# Patient Record
Sex: MALE | Race: WHITE | Employment: OTHER | URBAN - METROPOLITAN AREA
[De-identification: names, ages, dates, MRNs, and addresses within clinical notes are randomized per-mention and may not be internally consistent; named-entity substitution may affect disease eponyms.]

---

## 2017-04-20 PROBLEM — L72.3 SEBACEOUS CYST: Status: ACTIVE | Noted: 2017-04-20

## 2017-09-06 PROBLEM — M54.30 SCIATIC LEG PAIN: Status: ACTIVE | Noted: 2017-09-06

## 2017-11-09 PROBLEM — M48.062 SPINAL STENOSIS OF LUMBAR REGION WITH NEUROGENIC CLAUDICATION: Status: ACTIVE | Noted: 2017-11-09

## 2017-11-09 PROBLEM — M54.16 LUMBAR RADICULOPATHY: Status: ACTIVE | Noted: 2017-11-09

## 2017-12-14 PROBLEM — M48.062 LUMBAR STENOSIS WITH NEUROGENIC CLAUDICATION: Status: ACTIVE | Noted: 2017-12-14

## 2018-05-09 PROBLEM — M54.16 LUMBAR RADICULOPATHY: Status: RESOLVED | Noted: 2017-11-09 | Resolved: 2018-05-09

## 2018-05-09 PROBLEM — M54.30 SCIATIC LEG PAIN: Status: RESOLVED | Noted: 2017-09-06 | Resolved: 2018-05-09

## 2018-05-09 PROBLEM — M48.062 SPINAL STENOSIS OF LUMBAR REGION WITH NEUROGENIC CLAUDICATION: Status: RESOLVED | Noted: 2017-11-09 | Resolved: 2018-05-09

## 2018-05-09 PROBLEM — H35.30 MACULAR DEGENERATION: Status: ACTIVE | Noted: 2018-05-09

## 2018-06-01 ENCOUNTER — APPOINTMENT (OUTPATIENT)
Dept: GENERAL RADIOLOGY | Age: 83
DRG: 246 | End: 2018-06-01
Attending: EMERGENCY MEDICINE
Payer: MEDICARE

## 2018-06-01 ENCOUNTER — HOSPITAL ENCOUNTER (INPATIENT)
Age: 83
LOS: 2 days | Discharge: HOME | DRG: 246 | End: 2018-06-03
Attending: EMERGENCY MEDICINE | Admitting: INTERNAL MEDICINE
Payer: MEDICARE

## 2018-06-01 DIAGNOSIS — I21.4 NSTEMI (NON-ST ELEVATED MYOCARDIAL INFARCTION) (HCC): Primary | ICD-10-CM

## 2018-06-01 PROBLEM — E78.5 DYSLIPIDEMIA: Status: ACTIVE | Noted: 2018-06-01

## 2018-06-01 PROBLEM — I24.9 ACS (ACUTE CORONARY SYNDROME) (HCC): Status: ACTIVE | Noted: 2018-06-01

## 2018-06-01 PROBLEM — I24.9 ACS (ACUTE CORONARY SYNDROME) (HCC): Status: RESOLVED | Noted: 2018-06-01 | Resolved: 2018-06-01

## 2018-06-01 PROBLEM — I50.21 ACUTE SYSTOLIC CONGESTIVE HEART FAILURE (HCC): Status: ACTIVE | Noted: 2018-06-01

## 2018-06-01 PROBLEM — I20.0 UNSTABLE ANGINA (HCC): Status: ACTIVE | Noted: 2018-06-01

## 2018-06-01 LAB
ACT BLD: 285 SECS (ref 70–128)
ACT BLD: 345 SECS (ref 70–128)
ALBUMIN SERPL-MCNC: 3.8 G/DL (ref 3.2–4.6)
ALBUMIN/GLOB SERPL: 1 {RATIO} (ref 1.2–3.5)
ALP SERPL-CCNC: 81 U/L (ref 50–136)
ALT SERPL-CCNC: 51 U/L (ref 12–65)
ANION GAP SERPL CALC-SCNC: 11 MMOL/L (ref 7–16)
AST SERPL-CCNC: 49 U/L (ref 15–37)
ATRIAL RATE: 102 BPM
BASOPHILS # BLD: 0 K/UL (ref 0–0.2)
BASOPHILS NFR BLD: 0 % (ref 0–2)
BILIRUB SERPL-MCNC: 0.4 MG/DL (ref 0.2–1.1)
BNP SERPL-MCNC: 23 PG/ML
BUN SERPL-MCNC: 12 MG/DL (ref 8–23)
CALCIUM SERPL-MCNC: 8.9 MG/DL (ref 8.3–10.4)
CALCULATED P AXIS, ECG09: 52 DEGREES
CALCULATED R AXIS, ECG10: 10 DEGREES
CALCULATED T AXIS, ECG11: 81 DEGREES
CHLORIDE SERPL-SCNC: 98 MMOL/L (ref 98–107)
CO2 SERPL-SCNC: 26 MMOL/L (ref 21–32)
CREAT SERPL-MCNC: 1.08 MG/DL (ref 0.8–1.5)
DIAGNOSIS, 93000: NORMAL
DIFFERENTIAL METHOD BLD: ABNORMAL
EOSINOPHIL # BLD: 0.1 K/UL (ref 0–0.8)
EOSINOPHIL NFR BLD: 1 % (ref 0.5–7.8)
ERYTHROCYTE [DISTWIDTH] IN BLOOD BY AUTOMATED COUNT: 12.5 % (ref 11.9–14.6)
GLOBULIN SER CALC-MCNC: 3.8 G/DL (ref 2.3–3.5)
GLUCOSE SERPL-MCNC: 163 MG/DL (ref 65–100)
HCT VFR BLD AUTO: 41.6 % (ref 41.1–50.3)
HGB BLD-MCNC: 14.4 G/DL (ref 13.6–17.2)
IMM GRANULOCYTES # BLD: 0 K/UL (ref 0–0.5)
IMM GRANULOCYTES NFR BLD AUTO: 0 % (ref 0–5)
LYMPHOCYTES # BLD: 1.9 K/UL (ref 0.5–4.6)
LYMPHOCYTES NFR BLD: 30 % (ref 13–44)
MAGNESIUM SERPL-MCNC: 2.2 MG/DL (ref 1.8–2.4)
MCH RBC QN AUTO: 32 PG (ref 26.1–32.9)
MCHC RBC AUTO-ENTMCNC: 34.6 G/DL (ref 31.4–35)
MCV RBC AUTO: 92.4 FL (ref 79.6–97.8)
MONOCYTES # BLD: 0.4 K/UL (ref 0.1–1.3)
MONOCYTES NFR BLD: 6 % (ref 4–12)
NEUTS SEG # BLD: 4 K/UL (ref 1.7–8.2)
NEUTS SEG NFR BLD: 63 % (ref 43–78)
P-R INTERVAL, ECG05: 164 MS
PLATELET # BLD AUTO: 214 K/UL (ref 150–450)
PMV BLD AUTO: 9.3 FL (ref 10.8–14.1)
POTASSIUM SERPL-SCNC: 3.6 MMOL/L (ref 3.5–5.1)
PROT SERPL-MCNC: 7.6 G/DL (ref 6.3–8.2)
Q-T INTERVAL, ECG07: 338 MS
QRS DURATION, ECG06: 82 MS
QTC CALCULATION (BEZET), ECG08: 440 MS
RBC # BLD AUTO: 4.5 M/UL (ref 4.23–5.67)
SODIUM SERPL-SCNC: 135 MMOL/L (ref 136–145)
TROPONIN I SERPL-MCNC: 0.69 NG/ML (ref 0.02–0.05)
TROPONIN I SERPL-MCNC: 8.5 NG/ML (ref 0.02–0.05)
VENTRICULAR RATE, ECG03: 102 BPM
WBC # BLD AUTO: 6.4 K/UL (ref 4.3–11.1)

## 2018-06-01 PROCEDURE — 96374 THER/PROPH/DIAG INJ IV PUSH: CPT | Performed by: EMERGENCY MEDICINE

## 2018-06-01 PROCEDURE — C1769 GUIDE WIRE: HCPCS

## 2018-06-01 PROCEDURE — 83735 ASSAY OF MAGNESIUM: CPT | Performed by: EMERGENCY MEDICINE

## 2018-06-01 PROCEDURE — 74011250637 HC RX REV CODE- 250/637: Performed by: NURSE PRACTITIONER

## 2018-06-01 PROCEDURE — 77030019569 HC BND COMPR RAD TERU -B

## 2018-06-01 PROCEDURE — 85347 COAGULATION TIME ACTIVATED: CPT

## 2018-06-01 PROCEDURE — 83880 ASSAY OF NATRIURETIC PEPTIDE: CPT | Performed by: EMERGENCY MEDICINE

## 2018-06-01 PROCEDURE — 74011250636 HC RX REV CODE- 250/636: Performed by: EMERGENCY MEDICINE

## 2018-06-01 PROCEDURE — 74011250636 HC RX REV CODE- 250/636: Performed by: INTERNAL MEDICINE

## 2018-06-01 PROCEDURE — 74011250636 HC RX REV CODE- 250/636

## 2018-06-01 PROCEDURE — 99152 MOD SED SAME PHYS/QHP 5/>YRS: CPT

## 2018-06-01 PROCEDURE — 92928 PRQ TCAT PLMT NTRAC ST 1 LES: CPT

## 2018-06-01 PROCEDURE — 74011250637 HC RX REV CODE- 250/637: Performed by: INTERNAL MEDICINE

## 2018-06-01 PROCEDURE — 74011000250 HC RX REV CODE- 250: Performed by: INTERNAL MEDICINE

## 2018-06-01 PROCEDURE — 36415 COLL VENOUS BLD VENIPUNCTURE: CPT | Performed by: INTERNAL MEDICINE

## 2018-06-01 PROCEDURE — 77030004534 HC CATH ANGI DX INFN CARD -A

## 2018-06-01 PROCEDURE — C1887 CATHETER, GUIDING: HCPCS

## 2018-06-01 PROCEDURE — 80053 COMPREHEN METABOLIC PANEL: CPT | Performed by: EMERGENCY MEDICINE

## 2018-06-01 PROCEDURE — 71046 X-RAY EXAM CHEST 2 VIEWS: CPT

## 2018-06-01 PROCEDURE — 027236Z DILATION OF CORONARY ARTERY, THREE ARTERIES WITH THREE DRUG-ELUTING INTRALUMINAL DEVICES, PERCUTANEOUS APPROACH: ICD-10-PCS | Performed by: INTERNAL MEDICINE

## 2018-06-01 PROCEDURE — 99285 EMERGENCY DEPT VISIT HI MDM: CPT | Performed by: EMERGENCY MEDICINE

## 2018-06-01 PROCEDURE — 93458 L HRT ARTERY/VENTRICLE ANGIO: CPT

## 2018-06-01 PROCEDURE — 85025 COMPLETE CBC W/AUTO DIFF WBC: CPT | Performed by: EMERGENCY MEDICINE

## 2018-06-01 PROCEDURE — C1894 INTRO/SHEATH, NON-LASER: HCPCS

## 2018-06-01 PROCEDURE — 77030015766

## 2018-06-01 PROCEDURE — 4A023N7 MEASUREMENT OF CARDIAC SAMPLING AND PRESSURE, LEFT HEART, PERCUTANEOUS APPROACH: ICD-10-PCS | Performed by: INTERNAL MEDICINE

## 2018-06-01 PROCEDURE — 93005 ELECTROCARDIOGRAM TRACING: CPT | Performed by: EMERGENCY MEDICINE

## 2018-06-01 PROCEDURE — 74011636320 HC RX REV CODE- 636/320: Performed by: INTERNAL MEDICINE

## 2018-06-01 PROCEDURE — B2151ZZ FLUOROSCOPY OF LEFT HEART USING LOW OSMOLAR CONTRAST: ICD-10-PCS | Performed by: INTERNAL MEDICINE

## 2018-06-01 PROCEDURE — C1725 CATH, TRANSLUMIN NON-LASER: HCPCS

## 2018-06-01 PROCEDURE — C1874 STENT, COATED/COV W/DEL SYS: HCPCS

## 2018-06-01 PROCEDURE — 74011250637 HC RX REV CODE- 250/637: Performed by: EMERGENCY MEDICINE

## 2018-06-01 PROCEDURE — 65660000000 HC RM CCU STEPDOWN

## 2018-06-01 PROCEDURE — 84484 ASSAY OF TROPONIN QUANT: CPT | Performed by: EMERGENCY MEDICINE

## 2018-06-01 PROCEDURE — B2111ZZ FLUOROSCOPY OF MULTIPLE CORONARY ARTERIES USING LOW OSMOLAR CONTRAST: ICD-10-PCS | Performed by: INTERNAL MEDICINE

## 2018-06-01 PROCEDURE — 99153 MOD SED SAME PHYS/QHP EA: CPT

## 2018-06-01 RX ORDER — METOPROLOL SUCCINATE 25 MG/1
25 TABLET, EXTENDED RELEASE ORAL DAILY
Status: DISCONTINUED | OUTPATIENT
Start: 2018-06-02 | End: 2018-06-01

## 2018-06-01 RX ORDER — METOPROLOL SUCCINATE 25 MG/1
25 TABLET, EXTENDED RELEASE ORAL DAILY
Status: DISCONTINUED | OUTPATIENT
Start: 2018-06-01 | End: 2018-06-02

## 2018-06-01 RX ORDER — FUROSEMIDE 10 MG/ML
20 INJECTION INTRAMUSCULAR; INTRAVENOUS ONCE
Status: COMPLETED | OUTPATIENT
Start: 2018-06-01 | End: 2018-06-01

## 2018-06-01 RX ORDER — OXYMETAZOLINE HCL 0.05 %
2 SPRAY, NON-AEROSOL (ML) NASAL
Status: DISCONTINUED | OUTPATIENT
Start: 2018-06-01 | End: 2018-06-03 | Stop reason: HOSPADM

## 2018-06-01 RX ORDER — SODIUM CHLORIDE 9 MG/ML
150 INJECTION, SOLUTION INTRAVENOUS CONTINUOUS
Status: DISCONTINUED | OUTPATIENT
Start: 2018-06-01 | End: 2018-06-02

## 2018-06-01 RX ORDER — SODIUM CHLORIDE 0.9 % (FLUSH) 0.9 %
5-10 SYRINGE (ML) INJECTION EVERY 8 HOURS
Status: DISCONTINUED | OUTPATIENT
Start: 2018-06-01 | End: 2018-06-03 | Stop reason: HOSPADM

## 2018-06-01 RX ORDER — NITROGLYCERIN 0.4 MG/1
0.4 TABLET SUBLINGUAL
Status: DISCONTINUED | OUTPATIENT
Start: 2018-06-01 | End: 2018-06-03 | Stop reason: HOSPADM

## 2018-06-01 RX ORDER — HEPARIN SODIUM 200 [USP'U]/100ML
3 INJECTION, SOLUTION INTRAVENOUS CONTINUOUS
Status: DISCONTINUED | OUTPATIENT
Start: 2018-06-01 | End: 2018-06-01

## 2018-06-01 RX ORDER — MIDAZOLAM HYDROCHLORIDE 1 MG/ML
.5-2 INJECTION, SOLUTION INTRAMUSCULAR; INTRAVENOUS
Status: DISCONTINUED | OUTPATIENT
Start: 2018-06-01 | End: 2018-06-01

## 2018-06-01 RX ORDER — SODIUM CHLORIDE 0.9 % (FLUSH) 0.9 %
5-10 SYRINGE (ML) INJECTION AS NEEDED
Status: DISCONTINUED | OUTPATIENT
Start: 2018-06-01 | End: 2018-06-03 | Stop reason: HOSPADM

## 2018-06-01 RX ORDER — HEPARIN SODIUM 5000 [USP'U]/ML
4000 INJECTION, SOLUTION INTRAVENOUS; SUBCUTANEOUS
Status: COMPLETED | OUTPATIENT
Start: 2018-06-01 | End: 2018-06-01

## 2018-06-01 RX ORDER — MORPHINE SULFATE 10 MG/ML
2 INJECTION, SOLUTION INTRAMUSCULAR; INTRAVENOUS
Status: DISCONTINUED | OUTPATIENT
Start: 2018-06-01 | End: 2018-06-03 | Stop reason: HOSPADM

## 2018-06-01 RX ORDER — HEPARIN SODIUM 10000 [USP'U]/ML
40-80 INJECTION, SOLUTION INTRAVENOUS; SUBCUTANEOUS
Status: DISCONTINUED | OUTPATIENT
Start: 2018-06-01 | End: 2018-06-01

## 2018-06-01 RX ORDER — FENTANYL CITRATE 50 UG/ML
25-50 INJECTION, SOLUTION INTRAMUSCULAR; INTRAVENOUS
Status: DISCONTINUED | OUTPATIENT
Start: 2018-06-01 | End: 2018-06-01

## 2018-06-01 RX ORDER — LISINOPRIL AND HYDROCHLOROTHIAZIDE 12.5; 2 MG/1; MG/1
1 TABLET ORAL
Status: DISCONTINUED | OUTPATIENT
Start: 2018-06-01 | End: 2018-06-02

## 2018-06-01 RX ORDER — ATORVASTATIN CALCIUM 40 MG/1
40 TABLET, FILM COATED ORAL
Status: DISCONTINUED | OUTPATIENT
Start: 2018-06-01 | End: 2018-06-02

## 2018-06-01 RX ORDER — GUAIFENESIN 100 MG/5ML
324 LIQUID (ML) ORAL
Status: COMPLETED | OUTPATIENT
Start: 2018-06-01 | End: 2018-06-01

## 2018-06-01 RX ORDER — ASPIRIN 81 MG/1
81 TABLET ORAL DAILY
Status: DISCONTINUED | OUTPATIENT
Start: 2018-06-02 | End: 2018-06-03 | Stop reason: HOSPADM

## 2018-06-01 RX ORDER — LIDOCAINE HYDROCHLORIDE 20 MG/ML
1-20 INJECTION, SOLUTION INFILTRATION; PERINEURAL
Status: DISCONTINUED | OUTPATIENT
Start: 2018-06-01 | End: 2018-06-01

## 2018-06-01 RX ORDER — HEPARIN SODIUM 5000 [USP'U]/100ML
12-25 INJECTION, SOLUTION INTRAVENOUS
Status: DISCONTINUED | OUTPATIENT
Start: 2018-06-01 | End: 2018-06-01

## 2018-06-01 RX ADMIN — LISINOPRIL AND HYDROCHLOROTHIAZIDE 1 TABLET: 12.5; 2 TABLET ORAL at 20:53

## 2018-06-01 RX ADMIN — HEPARIN SODIUM 3 ML/HR: 5000 INJECTION, SOLUTION INTRAVENOUS; SUBCUTANEOUS at 16:00

## 2018-06-01 RX ADMIN — MIDAZOLAM HYDROCHLORIDE 1 MG: 1 INJECTION, SOLUTION INTRAMUSCULAR; INTRAVENOUS at 16:03

## 2018-06-01 RX ADMIN — METOPROLOL SUCCINATE 25 MG: 25 TABLET, EXTENDED RELEASE ORAL at 17:55

## 2018-06-01 RX ADMIN — Medication 10 ML: at 20:54

## 2018-06-01 RX ADMIN — ATORVASTATIN CALCIUM 40 MG: 40 TABLET, FILM COATED ORAL at 20:53

## 2018-06-01 RX ADMIN — ASPIRIN 81 MG CHEWABLE TABLET 324 MG: 81 TABLET CHEWABLE at 15:26

## 2018-06-01 RX ADMIN — TIROFIBAN 0.15 MCG/KG/MIN: 5 INJECTION, SOLUTION INTRAVENOUS at 16:16

## 2018-06-01 RX ADMIN — OXYMETAZOLINE HYDROCHLORIDE 2 SPRAY: 5 SPRAY NASAL at 20:53

## 2018-06-01 RX ADMIN — MIDAZOLAM HYDROCHLORIDE 2 MG: 1 INJECTION, SOLUTION INTRAMUSCULAR; INTRAVENOUS at 16:05

## 2018-06-01 RX ADMIN — TIROFIBAN 2500 MCG: 3.75 INJECTION, SOLUTION INTRAVENOUS at 16:16

## 2018-06-01 RX ADMIN — IOPAMIDOL 285 ML: 755 INJECTION, SOLUTION INTRAVENOUS at 16:51

## 2018-06-01 RX ADMIN — FENTANYL CITRATE 25 MCG: 50 INJECTION, SOLUTION INTRAMUSCULAR; INTRAVENOUS at 16:03

## 2018-06-01 RX ADMIN — NITROGLYCERIN 0.15 MG: 200 INJECTION, SOLUTION INTRAVENOUS at 16:33

## 2018-06-01 RX ADMIN — FUROSEMIDE 20 MG: 10 INJECTION, SOLUTION INTRAMUSCULAR; INTRAVENOUS at 16:54

## 2018-06-01 RX ADMIN — LIDOCAINE HYDROCHLORIDE 60 MG: 20 INJECTION, SOLUTION INFILTRATION; PERINEURAL at 16:05

## 2018-06-01 RX ADMIN — SODIUM CHLORIDE 150 ML/HR: 900 INJECTION, SOLUTION INTRAVENOUS at 15:36

## 2018-06-01 RX ADMIN — HEPARIN SODIUM 2000 UNITS: 10000 INJECTION, SOLUTION INTRAVENOUS; SUBCUTANEOUS at 16:43

## 2018-06-01 RX ADMIN — NITROGLYCERIN 0.15 MG: 200 INJECTION, SOLUTION INTRAVENOUS at 16:25

## 2018-06-01 RX ADMIN — TICAGRELOR 180 MG: 90 TABLET ORAL at 16:55

## 2018-06-01 RX ADMIN — NITROGLYCERIN 1 INCH: 20 OINTMENT TOPICAL at 15:25

## 2018-06-01 RX ADMIN — HEPARIN SODIUM 3000 UNITS: 10000 INJECTION, SOLUTION INTRAVENOUS; SUBCUTANEOUS at 16:15

## 2018-06-01 RX ADMIN — Medication 10 ML: at 17:56

## 2018-06-01 RX ADMIN — HEPARIN SODIUM 2 ML: 10000 INJECTION, SOLUTION INTRAVENOUS; SUBCUTANEOUS at 16:05

## 2018-06-01 RX ADMIN — HEPARIN SODIUM 4000 UNITS: 5000 INJECTION, SOLUTION INTRAVENOUS; SUBCUTANEOUS at 15:27

## 2018-06-01 RX ADMIN — SODIUM CHLORIDE 150 ML/HR: 900 INJECTION, SOLUTION INTRAVENOUS at 17:58

## 2018-06-01 RX ADMIN — HEPARIN SODIUM AND DEXTROSE 12 UNITS/KG/HR: 5000; 5 INJECTION INTRAVENOUS at 15:33

## 2018-06-01 NOTE — ED PROVIDER NOTES
HPI Comments: presents with complaint of chest pain last evening after dinner. States it went away after a while and then started again this morning approximately 945. The pain came and went and started to  increase in frequency and duration so he came to the hospital.  He describes the pain as shooting and pressure. He reports sweating with the pain last evening. He reports radiation into his throat. He denies shortness breath nausea vomiting or exertional pain. He's never seen a cardiologist before. He does not take aspirin. He has never had pain like this before. He does not have pain now. Patient is a [de-identified] y.o. male presenting with chest pain. The history is provided by the patient. Chest Pain (Angina)    This is a new problem. The current episode started yesterday. The problem has been resolved. The pain is associated with normal activity. The pain is present in the substernal region. The pain is severe. The quality of the pain is described as pressure-like and sharp. Radiates to: anterior neck. Associated symptoms include diaphoresis. Pertinent negatives include no cough, no dizziness, no exertional chest pressure, no fever, no irregular heartbeat, no leg pain, no lower extremity edema, no malaise/fatigue, no nausea, no numbness, no shortness of breath and no vomiting. He has tried nothing for the symptoms. Risk factors include hypertension and male gender. His past medical history is significant for HTN. His past medical history does not include DM. Pertinent negatives include no cardiac catheterization, no echocardiogram and no cardiac stents.        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    Thromboembolus ([facility] [address] 8/2002 LLE; due to fall       Past Surgical History:   Procedure Laterality Date    HX CATARACT REMOVAL Right     w/lens implant    HX COLONOSCOPY      HX KNEE REPLACEMENT Left 4/2015         Family History:   Problem Relation Age of Onset    Cancer Mother     Hypertension Mother     Diabetes Father     Heart Disease Father        Social History     Social History    Marital status:      Spouse name: N/A    Number of children: N/A    Years of education: N/A     Occupational History    Not on file. Social History Main Topics    Smoking status: Former Smoker     Packs/day: 1.00     Years: 4.00     Quit date: 10/1/1960    Smokeless tobacco: Never Used    Alcohol use No    Drug use: No    Sexual activity: Not on file     Other Topics Concern    Not on file     Social History Narrative         ALLERGIES: Meloxicam    Review of Systems   Constitutional: Positive for diaphoresis. Negative for fever and malaise/fatigue. Respiratory: Negative for cough and shortness of breath. Cardiovascular: Positive for chest pain. Gastrointestinal: Negative for nausea and vomiting. Neurological: Negative for dizziness and numbness. All other systems reviewed and are negative. Vitals:    06/01/18 1523 06/01/18 1600 06/01/18 1725 06/01/18 1755   BP: 135/77 146/82 148/77 (!) 167/98   Pulse: 77 70 75 71   Resp: 18 18 17    Temp:   97.6 °F (36.4 °C)    SpO2: 93% 99% 98%    Weight:       Height:                Physical Exam   Constitutional: He is oriented to person, place, and time. He appears well-developed and well-nourished. No distress. HENT:   Head: Normocephalic and atraumatic. Neck: Normal range of motion. Neck supple. Cardiovascular: Normal rate and regular rhythm. Pulmonary/Chest: Effort normal and breath sounds normal. No respiratory distress. He has no wheezes. He has no rales. Abdominal: Soft. He exhibits no distension. There is no tenderness. There is no rebound and no guarding. Musculoskeletal: Normal range of motion. He exhibits no edema or tenderness. Neurological: He is alert and oriented to person, place, and time. No cranial nerve deficit. Coordination normal.   Skin: Skin is warm and dry. No rash noted. He is not diaphoretic. No erythema. Psychiatric: He has a normal mood and affect. His behavior is normal.   Nursing note and vitals reviewed. MDM  Number of Diagnoses or Management Options  NSTEMI (non-ST elevated myocardial infarction) [facility]):   Diagnosis management comments: Patient with no previous cardiac workup. His EKG shows some nonspecific ST changes inferiorly but no elevation and not significantly changed from his previous. His troponin is elevated so he was given aspirin and nitroglycerin paste and heparin. I discussed this case with Dr. Sarah José. Wanted fluids started for possible cath today.          Amount and/or Complexity of Data Reviewed  Clinical lab tests: ordered and reviewed  Review and summarize past medical records: yes (Follows with IM no cardiac work up 1 previous EKG )  Discuss the patient with other providers: yes  Independent visualization of images, tracings, or specimens: yes (As above)    Risk of Complications, Morbidity, and/or Mortality  Presenting problems: high  Diagnostic procedures: high  Management options: high    Patient Progress  Patient progress: stable        ED Course       Procedures

## 2018-06-01 NOTE — H&P
[facility] Cardiology H&P    Admitting Cardiologist:Dr. Jaja Mai     Primary Cardiologist:none    Primary Care Physician:Dr. Haney    Subjective: Melisa Larsen is a [de-identified] y.o. male with no prior CAD hx but with HTN, + family hx of CAD--father had stents, brother. He pesents to ER today with complaint of chest pain started last night while at rest, mid chest with no associated nausea, vomiting but with diaphoresis. Pain lasted approximately 1 1/2 hours. He was able to go back to sleep and had recurrence of CP again today while at rest so he presented to ER for further evaluation. He is CP free at present. He took nothing for his CP. Initial troponin is positive at 0.69. ECG noted NSR unchanged from previous tracings. 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    Thromboembolus ([address] 8/2002    LLE; due to fall      Past Surgical History:   Procedure Laterality Date    HX CATARACT REMOVAL Right     w/lens implant    HX COLONOSCOPY      HX KNEE REPLACEMENT Left 4/2015      Current Facility-Administered Medications   Medication Dose Route Frequency    aspirin chewable tablet 324 mg  324 mg Oral NOW    heparin (porcine) injection 4,000 Units  4,000 Units IntraVENous NOW    heparin 25,000 units in dextrose 500 mL infusion  12-25 Units/kg/hr IntraVENous TITRATE    nitroglycerin (NITROBID) 2 % ointment 1 Inch  1 Inch Topical NOW    0.9% sodium chloride infusion  150 mL/hr IntraVENous CONTINUOUS     Current Outpatient Prescriptions   Medication Sig    aspirin delayed-release 81 mg tablet Take  by mouth daily.  fluticasone (FLONASE) 50 mcg/actuation nasal spray USE 2 SPRAYS IN BOTH NOSTRILS DAILY.     lisinopril-hydroCHLOROthiazide (PRINZIDE, ZESTORETIC) 20-12.5 mg per tablet Take 1 Tab by mouth nightly. Indications: hypertension    OMEGA-3 FATTY ACIDS (FISH OIL CONCENTRATE PO) Take  by mouth.  cholecalciferol (VITAMIN D3) 1,000 unit tablet Take 1,000 Units by mouth nightly.  MULTIVITAMIN W-MINERALS/LUTEIN (MULTIVITAMIN 50 PLUS PO) Take 1 Tab by mouth daily.  latanoprost (XALATAN) 0.005 % ophthalmic solution Administer 1 Drop to both eyes nightly.  b complex vitamins tablet Take 1 Tab by mouth daily.  fexofenadine (ALLEGRA) 180 mg tablet Take 180 mg by mouth daily. Per anesthesia protocol:instructed to take am of surgery. Indications: ALLERGIC RHINITIS    BETA-CAROTENE,A, W-C & E/ZN/CU (VISION-LAMONT + ZINC PO) Take 1 Tab by mouth daily. Allergies   Allergen Reactions    Meloxicam Itching      Social History   Substance Use Topics    Smoking status: Former Smoker     Packs/day: 1.00     Years: 4.00     Quit date: 10/1/1960    Smokeless tobacco: Never Used    Alcohol use No      Family History   Problem Relation Age of Onset    Cancer Mother     Hypertension Mother     Diabetes Father     Heart Disease Father         Review of Systems  Gen: Denies fever, chills, malaise or fatigue. Appetite good. HEENT: Denies frequent headaches, dizzyness, visual disturbances, Neck pain or swallowing difficulty  Lungs: Denies shortness of breath, hx of COPD, breathing problems  Cardiovascular: as above  GI: Denies hememesis, dark tarry stools, No prior Hx of GI bleed, Denies constipation  : Denies dysuria, no complaints of frequency, nocturia  Heme: No prior bleeding disorders, no prior Cancer  Neuro: Denies prior CVA, TIA. Endocrine: no diabetes, thyroid disorders  Psychiatric: Denies anxiety, or other psychiatric illnesses.      Objective:     Visit Vitals    /83 (BP 1 Location: Right arm, BP Patient Position: Supine)    Pulse 81    Temp 98.2 °F (36.8 °C)    Resp 18    Ht 6' (1.829 m)    Wt 102.1 kg (225 lb)    SpO2 96%    BMI 30.52 kg/m2     General:Alert, cooperative, no distress, appears stated age  Head: Normocephalic, without obvious abnormality, atraumatic. Eyes: Conjunctivae/corneas clear. PERRL, EOMs intact  Nose:Nares normal. Septum midline. Mucosa normal. No drainage or sinus tenderness. Throat: Lips, mucosa, and tongue normal. Teeth and gums normal.   Neck: Supple, symmetrical, trachea midline,  no carotid bruit and no JVD. Lungs:Clear to auscultation bilaterally. Chest wall: No tenderness or deformity. Heart: Regular rate and rhythm, S1, S2 normal, no murmur, click, rub or gallop. Abdomen:Soft, non-tender. Bowel sounds normal. No masses, No organomegaly. Extremities: Extremities normal, atraumatic, no cyanosis or edema. Pulses: 2+ and symmetric all extremities. Skin: Skin color, texture, turgor normal. No rashes or lesions  Lymph nodes: Cervical, supraclavicular, and axillary nodes normal  Neurologic:No focal deficits identified                     Data Review:     Recent Results (from the past 24 hour(s))   CBC WITH AUTOMATED DIFF    Collection Time: 06/01/18  1:57 PM   Result Value Ref Range    WBC 6.4 4.3 - 11.1 K/uL    RBC 4.50 4.23 - 5.67 M/uL    HGB 14.4 13.6 - 17.2 g/dL    HCT 41.6 41.1 - 50.3 %    MCV 92.4 79.6 - 97.8 FL    MCH 32.0 26.1 - 32.9 PG    MCHC 34.6 31.4 - 35.0 g/dL    RDW 12.5 11.9 - 14.6 %    PLATELET 297 507 - 611 K/uL    MPV 9.3 (L) 10.8 - 14.1 FL    DF AUTOMATED      NEUTROPHILS 63 43 - 78 %    LYMPHOCYTES 30 13 - 44 %    MONOCYTES 6 4.0 - 12.0 %    EOSINOPHILS 1 0.5 - 7.8 %    BASOPHILS 0 0.0 - 2.0 %    IMMATURE GRANULOCYTES 0 0.0 - 5.0 %    ABS. NEUTROPHILS 4.0 1.7 - 8.2 K/UL    ABS. LYMPHOCYTES 1.9 0.5 - 4.6 K/UL    ABS. MONOCYTES 0.4 0.1 - 1.3 K/UL    ABS. EOSINOPHILS 0.1 0.0 - 0.8 K/UL    ABS. BASOPHILS 0.0 0.0 - 0.2 K/UL    ABS. IMM.  GRANS. 0.0 0.0 - 0.5 K/UL   METABOLIC PANEL, COMPREHENSIVE    Collection Time: 06/01/18  1:57 PM   Result Value Ref Range    Sodium 135 (L) 136 - 145 mmol/L    Potassium 3.6 3.5 - 5.1 mmol/L    Chloride 98 98 - 107 mmol/L    CO2 26 21 - 32 mmol/L    Anion gap 11 7 - 16 mmol/L    Glucose 163 (H) 65 - 100 mg/dL    BUN 12 8 - 23 MG/DL    Creatinine 1.08 0.8 - 1.5 MG/DL    GFR est AA >60 >60 ml/min/1.73m2    GFR est non-AA >60 >60 ml/min/1.73m2    Calcium 8.9 8.3 - 10.4 MG/DL    Bilirubin, total 0.4 0.2 - 1.1 MG/DL    ALT (SGPT) 51 12 - 65 U/L    AST (SGOT) 49 (H) 15 - 37 U/L    Alk. phosphatase 81 50 - 136 U/L    Protein, total 7.6 6.3 - 8.2 g/dL    Albumin 3.8 3.2 - 4.6 g/dL    Globulin 3.8 (H) 2.3 - 3.5 g/dL    A-G Ratio 1.0 (L) 1.2 - 3.5     TROPONIN I    Collection Time: 06/01/18  1:57 PM   Result Value Ref Range    Troponin-I, Qt. 0.69 (HH) 0.02 - 0.05 NG/ML         Assessment / Plan     Principal Problem:    Unstable angina (HCC) (6/1/2018)--symptoms concerning now with positive troponin, asa and IV heparin ordered. Plans for urgent LHC with possible PCI today. Active Problems:    Hypertension ()--on lisinopril at home, continue, add BB      Overview: controlled w/med      Dyslipidemia (6/1/2018)--presumed, will start lipitor 40mg hs. Check profile in am.               Leonardo Zarate NP       [facility]     6/1/2018     6:24 PM    I have personally seen and examined Juan Jose Bella with  Lasandra Moritz NP. I agree and confirm findings in history, physical exam, and assessment/plan as outlined above with following pertinent additions/exceptions:   Patient is a 73-year-old male with multiple cardiac risk factors but no documented history of coronary artery disease. He developed chest discomfort last night while at rest.  Symptoms lasted approximately 90 minutes. He was able to go back to sleep, but again had chest pain this morning. He describes this as a dull chest discomfort without radiation. Mild associated dyspnea and diaphoresis. Currently chest pain-free. His cardiac enzymes are abnormal with a troponin of 0.69.   PE: CV: RRR  L: CTA bilaterally. E: No edema. ASS/Plan: As above. NSTEMI. IV heparin and ASA has been given. Urgent LHC given unstable acute coronary syndrome.        Jenn Lyon MD

## 2018-06-01 NOTE — PROGRESS NOTES
Patient went to cath lab  Spoke with wife in waiting area    Braxton Kuhn, staff Darius [address]inn [address], [address] [facility]  /   [email or web address]

## 2018-06-01 NOTE — PROGRESS NOTES
Verbal and bedside shift report received from [facility], [facility] access [facility].

## 2018-06-01 NOTE — IP AVS SNAPSHOT
Rhianna Horvath 
 
 
 [address] [facility] [address] [facility] 
[phone] Patient: Karyn Khalil MRN: [id number] HYI:[birth year] About your hospitalization You were admitted on:  June 1, 2018 You last received care in the:  [facility] 3 TELEMETRY You were discharged on:  Yissel 3, 2018 Why you were hospitalized Your primary diagnosis was:  Nstemi (Non-St Elevated Myocardial Infarction) (Hcc) Your diagnoses also included:  Hypertension, Dyslipidemia, Acs (Acute Coronary Syndrome) (Hcc), Acute Systolic Congestive Heart Failure (Hcc) Follow-up Information Follow up With Details Comments Contact Info Dale Parmar MD   [id number] [id number] 
[phone] Reba Mcdaniels MD On 6/13/2018 at 12 noon in [facility] [address] [place] [id number] 
[phone] Discharge Orders Procedure Order Date Status Priority Quantity Spec Type Associated Dx REFERRAL TO CARDIAC REHAB 06/03/18 1025 Normal Routine 1 A check rolando indicates which time of day the medication should be taken. My Medications START taking these medications Instructions Each Dose to Equal  
 Morning Noon Evening Bedtime  
 carvedilol 6.25 mg tablet Commonly known as:  Carmen Gupta Take 1 Tab by mouth two (2) times daily (with meals). 6.25 mg  
    
  
   
   
  
   
  
 lisinopril 10 mg tablet Commonly known as:  Matilda Casillas Start taking on:  6/4/2018 Take 1 Tab by mouth daily. 10 mg  
    
  
   
   
   
  
 nitroglycerin 0.4 mg SL tablet Commonly known as:  NITROSTAT  
   
 1 Tab by SubLINGual route every five (5) minutes as needed for Chest Pain. Up to 3 doses. 0.4 mg  
    
   
   
   
  
 pantoprazole 40 mg tablet Commonly known as:  PROTONIX Start taking on:  6/4/2018 Take 1 Tab by mouth Daily (before breakfast).   
 40 mg  
    
  
   
   
   
  
 rosuvastatin 40 mg tablet Commonly known as:  CRESTOR Take 1 Tab by mouth nightly. 40 mg  
    
   
   
   
  
  
 spironolactone 25 mg tablet Commonly known as:  ALDACTONE Start taking on:  6/4/2018 Take 1 Tab by mouth daily. 25 mg  
    
  
   
   
   
  
 ticagrelor 90 mg tablet Commonly known as:  Anna[employer]ney [employer] Take 1 Tab by mouth every twelve (12) hours every twelve (12) hours. 90 mg CONTINUE taking these medications Instructions Each Dose to Equal  
 Morning Noon Evening Bedtime  
 aspirin delayed-release 81 mg tablet Your last dose was:  6/3/18 Take  by mouth daily. b complex vitamins tablet Take 1 Tab by mouth daily. 1 Tab  
    
  
   
   
   
  
 cholecalciferol 1,000 unit tablet Commonly known as:  VITAMIN D3 Take 1,000 Units by mouth nightly. 1000 Units  
    
   
   
   
  
  
 fexofenadine 180 mg tablet Commonly known as:  Wandalee Caballero Take 180 mg by mouth daily. Per anesthesia protocol:instructed to take am of surgery. Indications: ALLERGIC RHINITIS  
 180 mg FISH OIL CONCENTRATE PO Take  by mouth. fluticasone 50 mcg/actuation nasal spray Commonly known as:  FLONASE  
   
 USE 2 SPRAYS IN BOTH NOSTRILS DAILY. latanoprost 0.005 % ophthalmic solution Commonly known as:  Richardine Solum Administer 1 Drop to both eyes nightly. 1 Drop MULTIVITAMIN 50 PLUS PO Take 1 Tab by mouth daily. 1 Tab VISION-LAMONT + ZINC PO Take 1 Tab by mouth daily. 1 Tab STOP taking these medications   
 lisinopril-hydroCHLOROthiazide 20-12.5 mg per tablet Commonly known as:  Saul Shafre Where to Get Your Medications Information on where to get these meds will be given to you by the nurse or doctor. ! Ask your nurse or doctor about these medications  
  carvedilol 6.25 mg tablet  
 lisinopril 10 mg tablet  
 nitroglycerin 0.4 mg SL tablet  
 pantoprazole 40 mg tablet  
 rosuvastatin 40 mg tablet  
 spironolactone 25 mg tablet  
 ticagrelor 90 mg tablet Discharge Instructions The patient is being discharged home in stable condition on a low saturated fat, low cholesterol and low salt diet. The patient is instructed to advance activities as tolerated to the limit of fatigue or shortness of breath. The patient is instructed to avoid all heavy lifting, straining, stooping or squatting for 3-5 days. The patient is instructed to watch the cath site for bleeding/oozing; if seen, the patient is instructed to apply firm pressure with a clean cloth and call [facility]iology at [phone]. The patient is instructed to watch for signs of infection which include: increasing area of redness, fever/hot to touch or purulent drainage at the catheterization site. The patient is instructed not to soak in a bathtub for 7-10 days, but is cleared to shower. The patient is instructed to call the office or return to the ER for immediate evaluation for any shortness of breath or chest pain not relieved by NTG. Percutaneous Coronary Intervention: What to Expect at [facility] Your Recovery Percutaneous coronary intervention (PCI) is the name for procedures that are used to open a narrowed or blocked coronary artery. The two most common PCI procedures are coronary angioplasty and coronary stent placement. Your groin or arm may have a bruise and feel sore for a day or two after a percutaneous coronary intervention (PCI). You can do light activities around the house, but nothing strenuous for several days.  
This care sheet gives you a general idea about how long it will take for you to recover. But each person recovers at a different pace. Follow the steps below to get better as quickly as possible. How can you care for yourself at home? Activity ? · If the doctor gave you a sedative: ¨ For 24 hours, don't do anything that requires attention to detail. It takes time for the medicine's effects to completely wear off. ¨ For your safety, do not drive or operate any machinery that could be dangerous. Wait until the medicine wears off and you can think clearly and react easily. ? · Do not do strenuous exercise and do not lift, pull, or push anything heavy until your doctor says it is okay. This may be for a day or two. You can walk around the house and do light activity, such as cooking. ? · If the catheter was placed in your groin, try not to walk up stairs for the first couple of days. ? · If the catheter was placed in your arm near your wrist, do not bend your wrist deeply for the first couple of days. Be careful using your hand to get into and out of a chair or bed. ? · Carry your stent identification card with you at all times. ? · If your doctor recommends it, get more exercise. Walking is a good choice. Bit by bit, increase the amount you walk every day. Try for at least 30 minutes on most days of the week. Diet ? · Drink plenty of fluids to help your body flush out the dye. If you have kidney, heart, or liver disease and have to limit fluids, talk with your doctor before you increase the amount of fluids you drink. ? · Keep eating a heart-healthy diet that has lots of fruits, vegetables, and whole grains. If you have not been eating this way, talk to your doctor. You also may want to talk to a dietitian. This expert can help you to learn about healthy foods and plan meals. Medicines ? · Your doctor will tell you if and when you can restart your medicines. He or she will also give you instructions about taking any new medicines. ? · If you take blood thinners, such as warfarin (Coumadin), clopidogrel (Plavix), or aspirin, be sure to talk to your doctor. He or she will tell you if and when to start taking those medicines again. Make sure that you understand exactly what your doctor wants you to do.  
? · Your doctor will prescribe blood-thinning medicines. You will likely take aspirin plus another antiplatelet, such as clopidogrel (Plavix). It is very important that you take these medicines exactly as directed. These medicines help keep the coronary artery open and reduce your risk of a heart attack. ? · Call your doctor if you think you are having a problem with your medicine. ?Care of the catheter site ? · For 1 or 2 days, keep a bandage over the spot where the catheter was inserted. The bandage probably will fall off in this time. ? · Put ice or a cold pack on the area for 10 to 20 minutes at a time to help with soreness or swelling. Put a thin cloth between the ice and your skin. ? · You may shower 24 to 48 hours after the procedure, if your doctor okays it. Pat the incision dry. ? · Do not soak the catheter site until it is healed. Don't take a bath for 1 week, or until your doctor tells you it isokay. Follow-up care is a key part of your treatment and safety. Be sure to make and go to all appointments, and call your doctor if you are having problems. It's also a good idea to know your test results and keep a list of the medicines you take. When should you call for help? Call 911 anytime you think you may need emergency care. For example, call if: 
? · You passed out (lost consciousness). ? · You have severe trouble breathing. ? · You have sudden chest pain and shortness of breath, or you cough up blood. ? · You have symptoms of a heart attack, such as: ¨ Chest pain or pressure. ¨ Sweating. ¨ Shortness of breath. ¨ Nausea or vomiting.  
¨ Pain that spreads from the chest to the neck, jaw, or one or both shoulders or arms. ¨ Dizziness or lightheadedness. ¨ A fast or uneven pulse. After calling 911, chew 1 adult-strength aspirin. Wait for an ambulance. Do not try to drive yourself. ? · You have been diagnosed with angina, and you have angina symptoms that do not go away with rest or are not getting better within 5 minutes after you take one dose of nitroglycerin. ?Call your doctor now or seek immediate medical care if: 
? · You are bleeding from the area where the catheter was put in your artery. ? · You have a fast-growing, painful lump at the catheter site. ? · You have signs of infection, such as: 
¨ Increased pain, swelling, warmth, or redness. ¨ Red streaks leading from the catheter site. ¨ Pus draining from the catheter site. ¨ A fever. ? · Your leg or arm looks blue or feels cold, numb, or tingly. ? Watch closely for changes in your health, and be sure to contact your doctor if you have any problems. Cardiac Catheterization/Angiography Discharge Instructions *Check the puncture site frequently for swelling or bleeding. If you see any bleeding, lie down and apply pressure over the area with a clean town or washcloth. Notify your doctor for any redness, swelling, drainage or oozing from the puncture site. Notify your doctor for any fever or chills. *If the leg or arm with the puncture becomes cold, numb or painful, call [facility] Cardiology at  [phone]. *Activity should be limited for the next 48 hours. Climb stairs as little as possible and avoid any stooping, bending or strenuous activity for 48 hours. No heavy lifting (anything over 10 pounds) for three days. *Do not drive for 48 hours. *You may resume your usual diet. Drink more fluids than usual. 
 
*Have a responsible person drive you home and stay with you for at least 24 hours after your heart catheterization/angiography. *You may remove the bandage from your Right Arm in 24 hours.  You may shower in 24 hours. No tub baths, hot tubs or swimming for one week. Do not place any lotions, creams, powders, ointments over the puncture site for one week. You may place a clean band-aid over the puncture site each day for 5 days. Change this daily. Heart-Healthy Diet: Care Instructions Your Care Instructions A heart-healthy diet has lots of vegetables, fruits, nuts, beans, and whole grains, and is low in salt. It limits foods that are high in saturated fat, such as meats, cheeses, and fried foods. It may be hard to change your diet, but even small changes can lower your risk of heart attack and heart disease. Follow-up care is a key part of your treatment and safety. Be sure to make and go to all appointments, and call your doctor if you are having problems. It's also a good idea to know your test results and keep a list of the medicines you take. How can you care for yourself at home? Watch your portions · Learn what a serving is. A \"serving\" and a \"portion\" are not always the same thing. Make sure that you are not eating larger portions than are recommended. For example, a serving of pasta is ½ cup. A serving size of meat is 2 to 3 ounces. A 3-ounce serving is about the size of a deck of cards. Measure serving sizes until you are good at [place]" them. Keep in mind that restaurants often serve portions that are 2 or 3 times the size of one serving. · To keep your energy level up and keep you from feeling hungry, eat often but in smaller portions. · Eat only the number of calories you need to stay at a healthy weight. If you need to lose weight, eat fewer calories than your body burns (through exercise and other physical activity). Eat more fruits and vegetables · Eat a variety of fruit and vegetables every day. Dark green, deep orange, red, or yellow fruits and vegetables are especially good for you. Examples include spinach, carrots, peaches, and berries. · Keep carrots, celery, and other veggies handy for snacks. Buy fruit that is in season and store it where you can see it so that you will be tempted to eat it. · Cook dishes that have a lot of veggies in them, such as stir-fries and soups. Limit saturated and trans fat · Read food labels, and try to avoid saturated and trans fats. They increase your risk of heart disease. Trans fat is found in many processed foods such as cookies and crackers. · Use olive or canola oil when you cook. Try cholesterol-lowering spreads, such as Benecol or Take Control. · Bake, broil, grill, or steam foods instead of frying them. · Choose lean meats instead of high-fat meats such as hot dogs and sausages. Cut off all visible fat when you prepare meat. · Eat fish, skinless poultry, and meat alternatives such as soy products instead of high-fat meats. Soy products, such as tofu, may be especially good for your heart. · Choose low-fat or fat-free milk and dairy products. Eat fish · Eat at least two servings of fish a week. Certain fish, such as salmon and tuna, contain omega-3 fatty acids, which may help reduce your risk of heart attack. Eat foods high in fiber · Eat a variety of grain products every day. Include whole-grain foods that have lots of fiber and nutrients. Examples of whole-grain foods include oats, whole wheat bread, and brown rice. · Buy whole-grain breads and cereals, instead of white bread or pastries. Limit salt and sodium · Limit how much salt and sodium you eat to help lower your blood pressure. · Taste food before you salt it. Add only a little salt when you think you need it. With time, your taste buds will adjust to less salt. · Eat fewer snack items, fast foods, and other high-salt, processed foods. Check food labels for the amount of sodium in packaged foods. · Choose low-sodium versions of canned goods (such as soups, vegetables, and beans). Limit sugar · Limit drinks and foods with added sugar. These include candy, desserts, and soda pop. Limit alcohol · Limit alcohol to no more than 2 drinks a day for men and 1 drink a day for women. Too much alcohol can cause health problems. When should you call for help? Watch closely for changes in your health, and be sure to contact your doctor if: 
? · You would like help planning heart-healthy meals. Where can you learn more? Go to http://arsen-cam.info/. Enter V137 in the search box to learn more about \"Heart-Healthy Diet: Care Instructions. \" Current as of: September 21, 2016 Content Version: 11.4 © 1281-2466 [employer]. Care instructions adapted under license by [facility] (which disclaims liability or warranty for this information). If you have questions about a medical condition or this instruction, always ask your healthcare professional. [address] [address] any warranty or liability for your use of this information. Reducing Heart Attack Risk With Daily Medicine: Care Instructions Your Care Instructions Heart disease is the number one cause of death. If you are at risk for heart disease, there are many medicines that can reduce your risk. These include: · ACE inhibitors. These are a type of blood pressure medicine. They can reduce the risk of heart attacks and strokes if you are at high risk. · Statin medicines. These lower cholesterol. They can also reduce the risk of heart disease and strokes. · Aspirin. It can help certain people lower their risk of a heart attack or stroke. · Beta-blocker medicines. These are a type of blood pressure and heart medicine. They can reduce the chance of early death if you have had a heart attack. All medicines can cause side effects. So it is important to understand the pros and cons of any medicine you take. It is also important to take your medicines exactly as your doctor tells you to. Follow-up care is a key part of your treatment and safety. Be sure to make and go to all appointments, and call your doctor if you are having problems. It's also a good idea to know your test results and keep a list of the medicines you take. ACE inhibitors ACE (angiotensin-converting enzyme) inhibitors are used for three main reasons. They lower blood pressure, protect the kidneys, and prevent heart attacks and strokes. Examples include benazepril (Lotensin), lisinopril (Prinivil, Zestril), and ramipril (Altace). Before you start taking an ACE inhibitor, make sure your doctor knows if: 
· You are taking a water pill (diuretic). · You are taking potassium pills or using salt substitutes. · You are pregnant or breastfeeding. · You have had a kidney transplant or other kidney problems. ACE inhibitors can cause side effects. Call your doctor right away if you have: · Trouble breathing. · Swelling in your face, head, neck, or tongue. · Dizziness or lightheadedness. · A dry cough. Statins Statins lower cholesterol. Examples include atorvastatin (Lipitor), lovastatin (Mevacor), pravastatin (Pravachol), and simvastatin (Zocor). Before you start taking a statin, make sure your doctor knows if: 
· You have had a kidney transplant or other kidney problems. · You have liver disease. · You take any other prescription medicine, over-the-counter medicine, vitamins, supplements, or herbal remedies. · You are pregnant or breastfeeding. Statins can cause side effects. Call your doctor right away if you have: · New, severe muscle aches. · Brown urine. Aspirin Taking an aspirin every day can lower your risk for a heart attack. A heart attack occurs when a blood vessel in the heart gets blocked. When this happens, oxygen can't get to the heart muscle, and part of the heart dies. Aspirin can help prevent blood clots that can block the blood vessels. Talk to your doctor before you start taking aspirin every day. He or she may recommend that you take one low-dose aspirin (81 mg) tablet each day, with a meal and a full glass of water. Taking aspirin isn't right for everyone. This is because it can cause serious bleeding. And you may not be able to use aspirin if you: 
· Have asthma. · Have an ulcer or other stomach problem. · Take some other medicine (called a blood thinner) that prevents blood clots. · Are allergic to aspirin. Before having a surgery or procedure, tell your doctor or dentist that you take aspirin. He or she will tell you if you should stop taking aspirin beforehand. Make sure that you understand exactly what your doctor wants you to do. Aspirin can cause side effects. Call your doctor right away if you have: · Unusual bleeding or bruising. · Nausea, vomiting, or heartburn. · Black or bloody stools. Beta-blockers Beta-blockers are used for three main reasons. They lower blood pressure, relieve angina symptoms (such as chest pain or pressure), and reduce the chances of a second heart attack. They include atenolol (Tenormin), carvedilol (Coreg), and metoprolol (Lopressor). Before you start taking a beta-blocker, make sure your doctor knows if you have: · Severe asthma or frequent asthma attacks. · A very slow pulse (less than 55 beats a minute). Beta-blockers can cause side effects. Call your doctor right away if you have: · Wheezing or trouble breathing. · Dizziness or lightheadedness. · Asthma that gets worse. When should you call for help? Watch closely for changes in your health, and be sure to contact your doctor if you have any problems. Heart Attack: Care Instructions Your Care Instructions A heart attack (myocardial infarction, or MI) occurs when one or more of the coronary arteries, which supply the heart with oxygen-rich blood, is blocked.  A blockage usually occurs when plaque inside the artery breaks open and a blood clot forms in the artery. After a heart attack, you may be worried about your future. Over the next several weeks, your heart will start to heal. Though it can be hard to break old habits, you can prevent another heart attack by making some lifestyle changes and by taking medicines. You may use this information for ideas about what to do at home to speed your recovery. Follow-up care is a key part of your treatment and safety. Be sure to make and go to all appointments, and call your doctor if you are having problems. It's also a good idea to know your test results and keep a list of the medicines you take. How can you care for yourself at home? Activity ? · Until your doctor says it is okay, do not do strenuous exercise. And do not lift, pull, or push anything heavy. Ask your doctor what types of activities are safe for you. ? · If your doctor has not set you up with a cardiac rehabilitation (rehab) program, talk to him or her about whether that is right for you. Cardiac rehab includes supervised exercise. It also includes help with diet and lifestyle changes and emotional support. It may reduce your risk of future heart problems. ? · Increase your activities slowly. Take short rest breaks when you get tired. ? · If your doctor recommends it, get more exercise. Walking is a good choice. Bit by bit, increase the amount you walk every day. Try for at least 30 minutes on most days of the week. You also may want to swim, bike, or do other activities. Talk with your doctor before you start an exercise program to make sure it is safe for you. ? · Ask your doctor when you can drive, go back to work, and do other daily activities again. ? · You can have sex as soon as you feel ready for it. Often this means when you can easily walk around or climb stairs. Talk with your doctor if you have any concerns.  If you are taking nitroglycerin, do not take erection-enhancing medicine such as sildenafil (Viagra), tadalafil (Cialis), or vardenafil (Levitra) . ? Lifestyle changes ? · Do not smoke. Smoking increases your risk of another heart attack. If you need help quitting, talk to your doctor about stop-smoking programs and medicines. These can increase your chances of quitting for good. ? · Eat a heart-healthy diet that is low in saturated fat and salt, and is full of fruits, vegetables and whole-grains. Eat at least two servings of fish each week. You may get more details about how to eat healthy. But these tips can help you get started. ? · Stay at a healthy weight, or lose weight if you need to. Medicines ? · Be safe with medicines. Take your medicines exactly as prescribed. Call your doctor if you think you are having a problem with your medicine. You will get more details on the specific medicines your doctor prescribes. Do not stop taking your medicine unless your doctor tells you to. Not taking your medicine might raise your risk of having another heart attack. ? · You may need several medicines to help lower your risk of another heart attack. These include: ¨ Blood pressure medicines such as angiotensin-converting enzyme (ACE) inhibitors, ARBs (angiotensin II receptor blockers), and beta-blockers. ¨ Cholesterol medicine called statins. ¨ Aspirin and other blood thinners. These prevent blood clots that can cause a heart attack. ? · If your doctor has given you nitroglycerin, keep it with you at all times. If you have angina symptoms, such as chest pain or pressure, sit down and rest. Take the first dose of nitroglycerin as directed. If symptoms get worse or are not getting better within 5 minutes, call 911 right away. Stay on the phone. The emergency  will tell you what to do. ? · Do not take any over-the-counter medicines, vitamins, or herbal products without talking to your doctor first. ?Staying healthy ? · Manage other health conditions such as high blood pressure and diabetes. ? · Avoid colds and flu. Get a pneumococcal vaccine shot. If you have had one before, ask your doctor whether you need another dose. Get the flu vaccine every year. If you must be around people with colds or flu, wash your hands often. ? · Be sure to tell your doctor about any angina symptoms you have had, even if they went away. Pay attention to your angina symptoms. Know what is typical for you and learn how to control it. Know when to call for help. ? · Talk to your family, friends, or a counselor about your feelings. It is normal to feel frightened, angry, hopeless, helpless, and even guilty. Talking openly about bad feelings can help you cope. If you have symptoms of depression, talk to your doctor. When should you call for help? Call 911 anytime you think you may need emergency care. For example, call if: 
? · You have symptoms of a heart attack. These may include: ¨ Chest pain or pressure, or a strange feeling in the chest. 
¨ Sweating. ¨ Shortness of breath. ¨ Nausea or vomiting. ¨ Pain, pressure, or a strange feeling in the back, neck, jaw, or upper belly or in one or both shoulders or arms. ¨ Lightheadedness or sudden weakness. ¨ A fast or irregular heartbeat. After you call 911, the  may tell you to chew 1 adult-strength or 2 to 4 low-dose aspirin. Wait for an ambulance. Do not try to drive yourself. ? · You have angina symptoms (such as chest pain or pressure) that do not go away with rest or are not getting better within 5 minutes after you take a dose of nitroglycerin. ? · You passed out (lost consciousness). ? · You feel like you are having another heart attack. ?Call your doctor now or seek immediate medical care if: 
? · You are having angina symptoms, such as chest pain or pressure, more often than usual, or the symptoms are different or worse than usual.  
 ? · You have new or increased shortness of breath. ? · You are dizzy or lightheaded, or you feel like you may faint. ? Watch closely for changes in your health, and be sure to contact your doctor if you have any problems. DISCHARGE SUMMARY from Nurse PATIENT INSTRUCTIONS: 
 
After general anesthesia or intravenous sedation, for 24 hours or while taking prescription Narcotics: · Limit your activities · Do not drive and operate hazardous machinery · Do not make important personal or business decisions · Do  not drink alcoholic beverages · If you have not urinated within 8 hours after discharge, please contact your surgeon on call. Report the following to your surgeon: 
· Excessive pain, swelling, redness or odor of or around the surgical area · Temperature over 100.5 · Nausea and vomiting lasting longer than 4 hours or if unable to take medications · Any signs of decreased circulation or nerve impairment to extremity: change in color, persistent  numbness, tingling, coldness or increase pain · Any questions What to do at Home: *  Please give a list of your current medications to your Primary Care Provider. *  Please update this list whenever your medications are discontinued, doses are 
    changed, or new medications (including over-the-counter products) are added. *  Please carry medication information at all times in case of emergency situations. These are general instructions for a healthy lifestyle: No smoking/ No tobacco products/ Avoid exposure to second hand smoke Surgeon General's Warning:  Quitting smoking now greatly reduces serious risk to your health. Obesity, smoking, and sedentary lifestyle greatly increases your risk for illness A healthy diet, regular physical exercise & weight monitoring are important for maintaining a healthy lifestyle You may be retaining fluid if you have a history of heart failure or if you experience any of the following symptoms:  Weight gain of 3 pounds or more overnight or 5 pounds in a week, increased swelling in our hands or feet or shortness of breath while lying flat in bed. Please call your doctor as soon as you notice any of these symptoms; do not wait until your next office visit. Recognize signs and symptoms of STROKE: 
 
F-face looks uneven A-arms unable to move or move unevenly S-speech slurred or non-existent T-time-call 911 as soon as signs and symptoms begin-DO NOT go Back to bed or wait to see if you get better-TIME IS BRAIN. Warning Signs of HEART ATTACK Call 911 if you have these symptoms: 
? Chest discomfort. Most heart attacks involve discomfort in the center of the chest that lasts more than a few minutes, or that goes away and comes back. It can feel like uncomfortable pressure, squeezing, fullness, or pain. ? Discomfort in other areas of the upper body. Symptoms can include pain or discomfort in one or both arms, the back, neck, jaw, or stomach. ? Shortness of breath with or without chest discomfort. ? Other signs may include breaking out in a cold sweat, nausea, or lightheadedness. Don't wait more than five minutes to call [address]! Fast action can save your life. Calling 911 is almost always the fastest way to get lifesaving treatment. Emergency Medical Services staff can begin treatment when they arrive  up to an hour sooner than if someone gets to the hospital by car. The discharge information has been reviewed with the patient. The patient verbalized understanding. Discharge medications reviewed with the patient and appropriate educational materials and side effects teaching were provided. ___________________________________________________________________________________________________________________________________ MyChart Announcement We are excited to announce that we are making your provider's discharge notes available to you in [facility]. You will see these notes when they are completed and signed by the physician that discharged you from your recent hospital stay. If you have any questions or concerns about any information you see in [employer], please call the Health Information Department where you were seen or reach out to your Primary Care Provider for more information about your plan of care. Introducing [facility]! Shayla Garcia introduces [employer] patient portal. Now you can access parts of your medical record, email your doctor's office, and request medication refills online. 1. In your internet browser, go to [email or web address] 2. Click on the First Time User? Click Here link in the Sign In box. You will see the New Member Sign Up page. 3. Enter your [facility] Access Code exactly as it appears below. You will not need to use this code after youve completed the sign-up process. If you do not sign up before the expiration date, you must request a new code. · [employer] Access Code: [email or web address] Expires: 8/7/2018 11:28 AM 
 
4. Enter the last four digits of your Social Security Number (xxxx) and Date of Birth (mm/dd/yyyy) as indicated and click Submit. You will be taken to the next sign-up page. 5. Create a [facility] ID. This will be your [facility] login ID and cannot be changed, so think of one that is secure and easy to remember. 6. Create a [facility] password. You can change your password at any time. 7. Enter your Password Reset Question and Answer. This can be used at a later time if you forget your password. 8. Enter your e-mail address. You will receive e-mail notification when new information is available in [address] 9. Click Sign Up. You can now view and download portions of your medical record.  
10. Click the Download Summary menu link to download a portable copy of your medical information. If you have questions, please visit the Frequently Asked Questions section of the [employer] website. Remember, [employer] is NOT to be used for urgent needs. For medical emergencies, dial 911. Now available from your iPhone and Android! Introducing Wilfred Alberto As a [employer] patient, I wanted to make you aware of our electronic visit tool called Wilfred Alberto. [employer] 24/7 allows you to connect within minutes with a medical provider 24 hours a day, seven days a week via a mobile device or tablet or logging into a secure website from your computer. You can access Wilfred Alberto from anywhere in the [place]. A virtual visit might be right for you when you have a simple condition and feel like you just dont want to get out of bed, or cant get away from work for an appointment, when your regular [employer] provider is not available (evenings, weekends or holidays), or when youre out of town and need minor care. Electronic visits cost only $49 and if the [employer] 24/7 provider determines a prescription is needed to treat your condition, one can be electronically transmitted to a nearby pharmacy*. Please take a moment to enroll today if you have not already done so. The enrollment process is free and takes just a few minutes. To enroll, please download the [employer] 24/7 ariane to your tablet or phone, or visit [email or web address] to enroll on your computer. And, as an [address] [place] [address] patient with a [employer] account, the results of your visits will be scanned into your electronic medical record and your primary care provider will be able to view the scanned results. We urge you to continue to see your regular [employer] provider for your ongoing medical care.   And while your primary care provider may not be the one available when you seek a Wilfred Alberto virtual visit, the peace of mind you get from getting a real diagnosis real time can be priceless. For more information on Wilfred Alberto, view our Frequently Asked Questions (FAQs) at [email or web address]. Sincerely, 
 
Vicky Valentino MD 
Chief Medical Officer [address] *:  certain medications cannot be prescribed via Wilfred Alberto Providers Seen During Your Hospitalization Provider Specialty Primary office phone Eliu Rollins MD Emergency Medicine [phone] Cash Jewell MD Cardiology [phone] Your Primary Care Physician (PCP) Primary Care Physician Office Phone Office Fax [address] [facility] [address] [phone] You are allergic to the following Allergen Reactions Meloxicam Itching Recent Documentation Height Weight BMI Smoking Status 1.829 m 103.8 kg 31.04 kg/m2 Former Smoker Emergency Contacts Name Discharge Info Relation Home Work Mobile [facility] DISCHARGE CAREGIVER [3] Spouse [3] [phone] Patient Belongings The following personal items are in your possession at time of discharge: 
  Dental Appliances: Lowers, Uppers, With patient         Home Medications: None   Jewelry: Watch, Sent home  Clothing: At bedside    Other Valuables: Eyeglasses Please provide this summary of care documentation to your next provider. Signatures-by signing, you are acknowledging that this After Visit Summary has been reviewed with you and you have received a copy. Patient Signature:  ____________________________________________________________ Date:  ____________________________________________________________  
  
Zhane Douglass Provider Signature:  ____________________________________________________________ Date:  ____________________________________________________________

## 2018-06-01 NOTE — PROGRESS NOTES
Bedside and Verbal shift change report given to Bret Villatoro RN (oncoming nurse) by self Trev jiang). Report included the following information SBAR, Kardex, MAR and Recent Results. Aggrastat gtt verified at bedside per eMAR. TR band assessed and WDL.

## 2018-06-01 NOTE — IP AVS SNAPSHOT
[address] 
[phone] Patient: Josefa Hector MRN: [id number] EJM:[birth year] A check rolando indicates which time of day the medication should be taken. My Medications START taking these medications Instructions Each Dose to Equal  
 Morning Noon Evening Bedtime  
 carvedilol 6.25 mg tablet Commonly known as:  Chace Been Take 1 Tab by mouth two (2) times daily (with meals). 6.25 mg  
    
  
   
   
  
   
  
 lisinopril 10 mg tablet Commonly known as:  Mollie Ripa Start taking on:  6/4/2018 Take 1 Tab by mouth daily. 10 mg  
    
  
   
   
   
  
 nitroglycerin 0.4 mg SL tablet Commonly known as:  NITROSTAT  
   
 1 Tab by SubLINGual route every five (5) minutes as needed for Chest Pain. Up to 3 doses. 0.4 mg  
    
   
   
   
  
 pantoprazole 40 mg tablet Commonly known as:  PROTONIX Start taking on:  6/4/2018 Take 1 Tab by mouth Daily (before breakfast). 40 mg  
    
  
   
   
   
  
 rosuvastatin 40 mg tablet Commonly known as:  CRESTOR Take 1 Tab by mouth nightly. 40 mg  
    
   
   
   
  
  
 spironolactone 25 mg tablet Commonly known as:  ALDACTONE Start taking on:  6/4/2018 Take 1 Tab by mouth daily. 25 mg  
    
  
   
   
   
  
 ticagrelor 90 mg tablet Commonly known as:  Anna-Courtney [employer] Take 1 Tab by mouth every twelve (12) hours every twelve (12) hours. 90 mg CONTINUE taking these medications Instructions Each Dose to Equal  
 Morning Noon Evening Bedtime  
 aspirin delayed-release 81 mg tablet Your last dose was:  6/3/18 Take  by mouth daily. b complex vitamins tablet Take 1 Tab by mouth daily. 1 Tab  
    
  
   
   
   
  
 cholecalciferol 1,000 unit tablet Commonly known as:  VITAMIN D3 Take 1,000 Units by mouth nightly. 1000 Units  
    
   
   
   
  
  
 fexofenadine 180 mg tablet Commonly known as:  Aggie Mariah Take 180 mg by mouth daily. Per anesthesia protocol:instructed to take am of surgery. Indications: ALLERGIC RHINITIS  
 180 mg FISH OIL CONCENTRATE PO Take  by mouth. fluticasone 50 mcg/actuation nasal spray Commonly known as:  FLONASE  
   
 USE 2 SPRAYS IN BOTH NOSTRILS DAILY. latanoprost 0.005 % ophthalmic solution Commonly known as:  Rema Larkin Administer 1 Drop to both eyes nightly. 1 Drop MULTIVITAMIN 50 PLUS PO Take 1 Tab by mouth daily. 1 Tab VISION-LAMONT + ZINC PO Take 1 Tab by mouth daily. 1 Tab STOP taking these medications   
 lisinopril-hydroCHLOROthiazide 20-12.5 mg per tablet Commonly known as:  Grace Schilder Where to Get Your Medications Information on where to get these meds will be given to you by the nurse or doctor. ! Ask your nurse or doctor about these medications  
  carvedilol 6.25 mg tablet  
 lisinopril 10 mg tablet  
 nitroglycerin 0.4 mg SL tablet  
 pantoprazole 40 mg tablet  
 rosuvastatin 40 mg tablet  
 spironolactone 25 mg tablet  
 ticagrelor 90 mg tablet

## 2018-06-01 NOTE — PROGRESS NOTES
TRANSFER - OUT REPORT:    Verbal report given to Tanisha Rollins RN on Pascual Roca  being transferred to [facility] for routine progression of care       Report consisted of patients Situation, Background, Assessment and Recommendations(SBAR). Information from the following report(s) SBAR, Kardex, Procedure Summary and MAR was reviewed with the receiving nurse. Opportunity for questions and clarification was provided.       [facility] with Dr Amanuel Castro  aggrastat bolus and drip to be continued for 12 hours  5000 heparin  180 brilinta  1647 ACT = 285  2 versed  25 fentanyl  Right radial R band 12ml at 1650    20 iv lasix admin post case

## 2018-06-01 NOTE — ED TRIAGE NOTES
Pt has chest pain that started last night and again this morning after getting up. Pt states that pain feels heavy and sharp at times, radiates to the right side of the neck. Pt states his only cardiac hx is HTN and that it is controlled with medication. Pt denies nausea, is not diaphoretic, denies shob.

## 2018-06-01 NOTE — PROCEDURES
Brief Cardiac Procedure Note    Patient: Kari Bailon MRN: [id number]  SSN: [id number]    YOB: 1935  Age: [de-identified] y.o. Sex: male      Date of Procedure: 6/1/2018     Pre-procedure Diagnosis: NSTEMI    Post-procedure Diagnosis: Coronary artery disease    Procedure: Left Heart Catheterization with PCI    Brief Description of Procedure: As above    Performed By: Kash Pendleton MD     Assistants: None    Anesthesia: Moderate Sedation    Estimated Blood Loss: Less than 10 mL      Specimens: None    Implants: None    Findings: Obstructive CAD. Multivessel PCI. See dictated note. EF 40% with apical HK. Complications: None    Recommendations: Continue medical therapy.     Signed By: Kash Pendleton MD     June 1, 2018

## 2018-06-01 NOTE — ED NOTES
Assumed care of pt. Pt A&OX4. NAD noted having int chest pain, but none right now. Denies sob. Discussed poc. Family at bedside.

## 2018-06-01 NOTE — PROGRESS NOTES
Skin assessment completed with secondary RN. Sacrum and heels intact with no breakdown noted. Right radial puncture s/p heart cath. Site C/D/I.

## 2018-06-01 NOTE — PROGRESS NOTES
TRANSFER - IN REPORT:    Verbal report received from Rahat Leigh RN on Josefa Hector being received from cath lab for routine progression of care      Report consisted of patients Situation, Background, Assessment and Recommendations(SBAR). Information from the following report(s) SBAR, Kardex and Procedure Summary was reviewed with the receiving nurse. Opportunity for questions and clarification was provided. Assessment completed upon patients arrival to unit and care assumed. Telemetry monitor applied. VS taken. Pt right radial site assessed and WDL. TR band on. Pt and family verbalizes limited movement of RUE. Bed low and locked. Side rails x 2. Call light within reach. Pt verbalizes understanding to call for assistance.

## 2018-06-01 NOTE — PROCEDURES
[facility]    Doe Russo  MR#: [id number]  : 1935  ACCOUNT #: [de-identified]   DATE OF SERVICE: 2018    PROCEDURE PERFORMED:  Left heart catheterization, selective coronary arteriography, and left ventriculogram via the right radial approach. INDICATIONS:  Non-ST elevation myocardial infarction. Patient admitted with recurrent chest discomfort and positive cardiac biomarkers. TECHNICAL FACTORS:  After informed consent was obtained, the patient was brought to cardiac catheterization lab. The right radial artery was prepped and draped in the usual sterile fashion. Utilizing modified Seldinger technique and micropuncture needle, the right radial artery was entered. A 6-Irish Terumo slender sheath was placed without difficulty. A radial cocktail consisting of 2000 units of heparin, 2 mg of verapamil, and 200 mcg of nitroglycerin was administered. A 5-Irish Tiger 4.0 catheter  was used to selectively engage the ostium of the right coronary artery and a 5-Irish JL3.5 catheter was used to selectively engage the ostium of the left main coronary artery. Selective injection in various projections was performed. A pigtail catheter was used to cross the aortic valve and enter the left ventricle. Hemodynamic measurements and left ventriculogram were obtained. Left ventricular aortic pressure gradient was obtained by pullback technique. CONTRAST:  Isovue 285. SEDATION:  The patient received 2 mg of Versed and 25 mcg of fentanyl. Sedation start time was 4:02 with the procedure completion time of 4:51. Sedation was administered by Leif Almendarez RN, under my supervision. HEMODYNAMICS:  1. Aortic pressure was 135/79 with a mean of 98.  2.  Left ventricular end diastolic pressure was 39.  3.  There was no significant gradient across the aortic valve. ANGIOGRAPHIC RESULTS:  1.   Left main coronary artery is a large caliber vessel, 10% stenosis in the proximal and mid segments. 2.  LAD:  Is a large caliber vessel. It contained a 99% mid stenosis. The distal vessel contains 20% luminal irregularities. First diagonal artery is medium caliber and is patent. 3.  Left circumflex:  Medium caliber, nondominant vessel. 80% mid stenosis. 4.  Right coronary artery is a large caliber dominant vessel. It contains a high grade 90% mid stenosis. 20% distal stenosis. 5.  Right PDA:  Medium caliber vessel. 20% mid stenosis. 6.  Right posterolateral branch:  Medium caliber vessel. Patent. 7.  Left ventriculogram performed in the PHAN projection shows mildly reduced LV systolic function, EF 27-74% with apical severe hypokinesis. No mitral regurgitation. Aortic root is not dilated. CONCLUSION:  1.  Multivessel coronary artery disease. 2.  Mildly reduced LV systolic function with segmental wall motion abnormalities consistent with ischemic cardiomyopathy. PLAN:  Patient's calculated syntax score is 9. He has focal lesions in 3 vessels. This is also in the setting of acute coronary syndrome. We will plan multivessel PCI. PERCUTANEOUS CORONARY INTERVENTION NOTE:    LESION #1:  Mid LAD, prestenosis 99% with AMAIRANI 3 flow, post-stenosis 0% with AMAIRANI 3 flow. TECHNICAL FACTORS:  EBU 3.0 guide was used to selectively engage the ostium of the left main coronary artery. Prowater wire was positioned distal to the stenosis. The stenosis was predilated with a 2.5 x 12 mm Emerge balloon at 12 atmospheres. Following predilatation, a Synergy 3.5 x 16 mm drug-eluting stent was positioned and deployed to 16 atmospheres. Following stent deployment, a 3.5 x 12 mm NC Quantum Dayton was positioned and deployed to 20 atmospheres. 150 mcg of intracoronary nitroglycerin was administered. Final angiography showed an excellent result. LESION #2:  Mid circumflex, pre-stenosis 80%, post-stenosis 0%.     TECHNICAL FACTORS:  The Prowater wire was left in the LAD to maintain guide position and support. A Whisper wire was then advanced into the distal circumflex. The mid circumflex lesion was direct stented with a 3.0 x 20 mm Synergy drug-eluting stent at 14 atmospheres. Following stent deployment, NC Quantum Weston 3.0 x 15 mm balloon was positioned, deployed at 20 atmospheres on 2 separate occasions. Following post-dilatation, there was excellent angiographic result with no residual stenosis. LESION #3:  Mid right coronary artery, prestenosis 99%, post-stenosis 0%. TECHNICAL FACTORS:  A JR5 guide was used to selectively engage the ostium of the right coronary artery. A Prowater wire was positioned distal to the  stenosis. The stenosis was predilated with a 3.0 x 15 mm NC Quantum balloon to 14 atmospheres. Following predilatation, a Resolute Bartlett 5.0 x 22 mm drug-eluting stent was positioned and deployed at 14 atmospheres. Following stent deployment, NC Quantum Weston 5.0 x 12 mm balloon was positioned and deployed at 18 atmospheres for appropriate post-dilatation. Following post-dilatation, there was excellent angiographic result. The wire was removed. Final orthogonal projections were obtained. CONCLUSION:  1. Successful complex multivessel stenting. 2.  Successful PCI and stenting of the mid LAD with a 3.5 x 16 mm Synergy drug-eluting stent. 3.  Successful PCI and stenting of the mid circumflex with a Synergy 3.0 x 20 mm drug-eluting stent. 4.  Successful PCI and stenting of the mid right coronary artery with a 5.0 x 22 mm Resolute Gerhard drug-eluting stent. PLAN:  Monitor the patient closely in the post-procedural setting. NOTE:  Anticoagulation was intravenous heparin and Aggrastat. The patient received Brilinta at the conclusion of the procedure. ACTs were maintained above 300.       MD ANGELIA Lainez / CRYSTAL  D: 06/01/2018 17:12     T: 06/01/2018 18:21  JOB #: [id number]  CC: Thony Paredes MD

## 2018-06-02 LAB
ANION GAP SERPL CALC-SCNC: 12 MMOL/L (ref 7–16)
BASOPHILS # BLD: 0 K/UL (ref 0–0.2)
BASOPHILS NFR BLD: 0 % (ref 0–2)
BUN SERPL-MCNC: 12 MG/DL (ref 8–23)
CALCIUM SERPL-MCNC: 8.6 MG/DL (ref 8.3–10.4)
CHLORIDE SERPL-SCNC: 100 MMOL/L (ref 98–107)
CHOLEST SERPL-MCNC: 158 MG/DL
CO2 SERPL-SCNC: 23 MMOL/L (ref 21–32)
CREAT SERPL-MCNC: 0.91 MG/DL (ref 0.8–1.5)
DIFFERENTIAL METHOD BLD: ABNORMAL
EOSINOPHIL # BLD: 0.1 K/UL (ref 0–0.8)
EOSINOPHIL NFR BLD: 1 % (ref 0.5–7.8)
ERYTHROCYTE [DISTWIDTH] IN BLOOD BY AUTOMATED COUNT: 12.7 % (ref 11.9–14.6)
GLUCOSE SERPL-MCNC: 119 MG/DL (ref 65–100)
HCT VFR BLD AUTO: 38 % (ref 41.1–50.3)
HDLC SERPL-MCNC: 27 MG/DL (ref 40–60)
HDLC SERPL: 5.9 {RATIO}
HGB BLD-MCNC: 13.3 G/DL (ref 13.6–17.2)
IMM GRANULOCYTES # BLD: 0 K/UL (ref 0–0.5)
IMM GRANULOCYTES NFR BLD AUTO: 0 % (ref 0–5)
LDLC SERPL CALC-MCNC: 82.6 MG/DL
LIPID PROFILE,FLP: ABNORMAL
LYMPHOCYTES # BLD: 1.8 K/UL (ref 0.5–4.6)
LYMPHOCYTES NFR BLD: 19 % (ref 13–44)
MCH RBC QN AUTO: 31.7 PG (ref 26.1–32.9)
MCHC RBC AUTO-ENTMCNC: 35 G/DL (ref 31.4–35)
MCV RBC AUTO: 90.7 FL (ref 79.6–97.8)
MONOCYTES # BLD: 0.8 K/UL (ref 0.1–1.3)
MONOCYTES NFR BLD: 8 % (ref 4–12)
NEUTS SEG # BLD: 6.9 K/UL (ref 1.7–8.2)
NEUTS SEG NFR BLD: 72 % (ref 43–78)
PLATELET # BLD AUTO: 220 K/UL (ref 150–450)
PMV BLD AUTO: 9.3 FL (ref 10.8–14.1)
POTASSIUM SERPL-SCNC: 3.2 MMOL/L (ref 3.5–5.1)
RBC # BLD AUTO: 4.19 M/UL (ref 4.23–5.67)
SODIUM SERPL-SCNC: 135 MMOL/L (ref 136–145)
TRIGL SERPL-MCNC: 242 MG/DL (ref 35–150)
TROPONIN I SERPL-MCNC: 11.6 NG/ML (ref 0.02–0.05)
VLDLC SERPL CALC-MCNC: 48.4 MG/DL (ref 6–23)
WBC # BLD AUTO: 9.5 K/UL (ref 4.3–11.1)

## 2018-06-02 PROCEDURE — 74011250637 HC RX REV CODE- 250/637: Performed by: INTERNAL MEDICINE

## 2018-06-02 PROCEDURE — 74011250636 HC RX REV CODE- 250/636: Performed by: INTERNAL MEDICINE

## 2018-06-02 PROCEDURE — C8929 TTE W OR WO FOL WCON,DOPPLER: HCPCS

## 2018-06-02 PROCEDURE — 84484 ASSAY OF TROPONIN QUANT: CPT | Performed by: INTERNAL MEDICINE

## 2018-06-02 PROCEDURE — 36415 COLL VENOUS BLD VENIPUNCTURE: CPT | Performed by: INTERNAL MEDICINE

## 2018-06-02 PROCEDURE — 74011250637 HC RX REV CODE- 250/637: Performed by: NURSE PRACTITIONER

## 2018-06-02 PROCEDURE — 65660000000 HC RM CCU STEPDOWN

## 2018-06-02 PROCEDURE — 85025 COMPLETE CBC W/AUTO DIFF WBC: CPT | Performed by: INTERNAL MEDICINE

## 2018-06-02 PROCEDURE — 80048 BASIC METABOLIC PNL TOTAL CA: CPT | Performed by: INTERNAL MEDICINE

## 2018-06-02 PROCEDURE — 74011000250 HC RX REV CODE- 250: Performed by: INTERNAL MEDICINE

## 2018-06-02 PROCEDURE — 80061 LIPID PANEL: CPT | Performed by: INTERNAL MEDICINE

## 2018-06-02 RX ORDER — METOPROLOL SUCCINATE 50 MG/1
50 TABLET, EXTENDED RELEASE ORAL DAILY
Status: DISCONTINUED | OUTPATIENT
Start: 2018-06-03 | End: 2018-06-02

## 2018-06-02 RX ORDER — POTASSIUM CHLORIDE 20 MEQ/1
40 TABLET, EXTENDED RELEASE ORAL
Status: COMPLETED | OUTPATIENT
Start: 2018-06-02 | End: 2018-06-02

## 2018-06-02 RX ORDER — LISINOPRIL 5 MG/1
10 TABLET ORAL DAILY
Status: DISCONTINUED | OUTPATIENT
Start: 2018-06-02 | End: 2018-06-03 | Stop reason: HOSPADM

## 2018-06-02 RX ORDER — PANTOPRAZOLE SODIUM 40 MG/1
40 TABLET, DELAYED RELEASE ORAL
Status: DISCONTINUED | OUTPATIENT
Start: 2018-06-02 | End: 2018-06-03 | Stop reason: HOSPADM

## 2018-06-02 RX ORDER — CARVEDILOL 6.25 MG/1
6.25 TABLET ORAL 2 TIMES DAILY WITH MEALS
Status: DISCONTINUED | OUTPATIENT
Start: 2018-06-02 | End: 2018-06-03 | Stop reason: HOSPADM

## 2018-06-02 RX ORDER — ROSUVASTATIN CALCIUM 20 MG/1
40 TABLET, COATED ORAL
Status: DISCONTINUED | OUTPATIENT
Start: 2018-06-02 | End: 2018-06-03 | Stop reason: HOSPADM

## 2018-06-02 RX ORDER — SPIRONOLACTONE 25 MG/1
25 TABLET ORAL DAILY
Status: DISCONTINUED | OUTPATIENT
Start: 2018-06-02 | End: 2018-06-03 | Stop reason: HOSPADM

## 2018-06-02 RX ADMIN — ROSUVASTATIN CALCIUM 40 MG: 20 TABLET, FILM COATED ORAL at 21:24

## 2018-06-02 RX ADMIN — TICAGRELOR 90 MG: 90 TABLET ORAL at 21:25

## 2018-06-02 RX ADMIN — PERFLUTREN 1 ML: 6.52 INJECTION, SUSPENSION INTRAVENOUS at 11:00

## 2018-06-02 RX ADMIN — Medication 5 ML: at 13:32

## 2018-06-02 RX ADMIN — PANTOPRAZOLE SODIUM 40 MG: 40 TABLET, DELAYED RELEASE ORAL at 11:05

## 2018-06-02 RX ADMIN — CARVEDILOL 6.25 MG: 6.25 TABLET, FILM COATED ORAL at 17:22

## 2018-06-02 RX ADMIN — TICAGRELOR 90 MG: 90 TABLET ORAL at 11:06

## 2018-06-02 RX ADMIN — SPIRONOLACTONE 25 MG: 25 TABLET ORAL at 11:06

## 2018-06-02 RX ADMIN — METOPROLOL SUCCINATE 25 MG: 25 TABLET, EXTENDED RELEASE ORAL at 08:56

## 2018-06-02 RX ADMIN — Medication 10 ML: at 21:31

## 2018-06-02 RX ADMIN — Medication 10 ML: at 05:50

## 2018-06-02 RX ADMIN — LISINOPRIL 10 MG: 5 TABLET ORAL at 11:05

## 2018-06-02 RX ADMIN — ASPIRIN 81 MG: 81 TABLET, COATED ORAL at 08:56

## 2018-06-02 RX ADMIN — POTASSIUM CHLORIDE 40 MEQ: 1500 TABLET, EXTENDED RELEASE ORAL at 08:56

## 2018-06-02 NOTE — PROGRESS NOTES
Bedside and Verbal shift change report given to Brandon White RN (oncoming nurse) by self [facility] ). Report included the following information SBAR, Kardex, MAR and Recent Results.

## 2018-06-02 NOTE — PROGRESS NOTES
Bedside and Verbal shift change report given to Belkis Corbin RN (oncoming nurse) by self [facility] Jose Armando jiang). Report included the following information SBAR, Kardex, MAR and Recent Results.

## 2018-06-02 NOTE — PROGRESS NOTES
Bedside and Verbal shift change report given to self (oncoming nurse) by Mary Cleary RN (offgoing nurse). Report included the following information SBAR, Kardex, MAR and Recent Results.

## 2018-06-02 NOTE — PROGRESS NOTES
Patient urinating small amount of blood and had minor nose bleed. Ramos Butler NP notified and orders for Afrin. Will continue to watch patient for signs of bleeding.

## 2018-06-02 NOTE — PROGRESS NOTES
Verbal and bedside shift report received from [facility].

## 2018-06-02 NOTE — PROGRESS NOTES
[facility] CARDIOLOGY PROGRESS NOTE           6/2/2018 9:45 AM    Admit Date: 6/1/2018      Subjective:   Patient with chest heaviness last night. Resolved this AM.  Different than presenting angina. Renal function stable. Denies active chest pain. ROS:  Cardiovascular:  As noted above    Objective:      Vitals:    06/01/18 2120 06/02/18 0124 06/02/18 0519 06/02/18 0856   BP: 192/74 129/78 125/74 127/69   Pulse: 80 83 75 86   Resp: 18 16 16 16   Temp: 97.8 °F (36.6 °C) 98.5 °F (36.9 °C) 98.7 °F (37.1 °C) 98.3 °F (36.8 °C)   SpO2: 98% 94% 95% 94%   Weight:   104.6 kg (230 lb 9.6 oz)    Height:           Physical Exam:  General-No Acute Distress  Neck- supple, no JVD  CV- regular rate and rhythm no MRG  Lung- clear bilaterally  Abd- soft, nontender, nondistended  Ext- no edema bilaterally. Skin- warm and dry      Data Review:   Recent Labs      06/02/18   0517  06/01/18   1357   NA  135*  135*   K  3.2*  3.6   MG   --   2.2   BUN  12  12   CREA  0.91  1.08   GLU  119*  163*   WBC  9.5  6.4   HGB  13.3*  14.4   HCT  38.0*  41.6   PLT  220  214   TRIGL  242*   --    HDL  27*   --         Lab Results   Component Value Date/Time    TROIQ 11.60 () 06/02/2018 12:09 AM    TROIQ 8.50 () 06/01/2018 05:59 PM    TROIQ 0.69 ([facility]) 06/01/2018 01:57 PM       Assessment/Plan:     Principal Problem:    NSTEMI (non-ST elevated myocardial infarction) ([facility] [address] (6/1/2018)    S/P complex multivessel PCI. ON ASA and Brilinta. Monitor on telemetry. Active Problems:    Hypertension ()    Change Toprol to coreg. Stop Lisinopril/HCTZ and start Lisinopril and Aldactone. Dyslipidemia (6/1/2018)    Crestor 40 mg PO QHS. Acute systolic congestive heart failure ([address] (6/1/2018)    Received IV lasix yesterday. Start Aldactone and assess volume status daily.              Princess Norwood MD  6/2/2018 9:45 AM

## 2018-06-03 VITALS
BODY MASS INDEX: 31 KG/M2 | OXYGEN SATURATION: 93 % | TEMPERATURE: 97.2 F | HEIGHT: 72 IN | RESPIRATION RATE: 17 BRPM | SYSTOLIC BLOOD PRESSURE: 108 MMHG | WEIGHT: 228.9 LBS | DIASTOLIC BLOOD PRESSURE: 56 MMHG | HEART RATE: 82 BPM

## 2018-06-03 LAB
ANION GAP SERPL CALC-SCNC: 13 MMOL/L (ref 7–16)
BUN SERPL-MCNC: 12 MG/DL (ref 8–23)
CALCIUM SERPL-MCNC: 8.8 MG/DL (ref 8.3–10.4)
CHLORIDE SERPL-SCNC: 101 MMOL/L (ref 98–107)
CO2 SERPL-SCNC: 21 MMOL/L (ref 21–32)
CREAT SERPL-MCNC: 0.89 MG/DL (ref 0.8–1.5)
GLUCOSE SERPL-MCNC: 115 MG/DL (ref 65–100)
MAGNESIUM SERPL-MCNC: 2.2 MG/DL (ref 1.8–2.4)
POTASSIUM SERPL-SCNC: 3.7 MMOL/L (ref 3.5–5.1)
SODIUM SERPL-SCNC: 135 MMOL/L (ref 136–145)

## 2018-06-03 PROCEDURE — 74011250637 HC RX REV CODE- 250/637: Performed by: INTERNAL MEDICINE

## 2018-06-03 PROCEDURE — 36415 COLL VENOUS BLD VENIPUNCTURE: CPT | Performed by: NURSE PRACTITIONER

## 2018-06-03 PROCEDURE — 83735 ASSAY OF MAGNESIUM: CPT | Performed by: NURSE PRACTITIONER

## 2018-06-03 PROCEDURE — 74011250637 HC RX REV CODE- 250/637: Performed by: NURSE PRACTITIONER

## 2018-06-03 PROCEDURE — 80048 BASIC METABOLIC PNL TOTAL CA: CPT | Performed by: NURSE PRACTITIONER

## 2018-06-03 RX ORDER — ROSUVASTATIN CALCIUM 40 MG/1
40 TABLET, COATED ORAL
Qty: 30 TAB | Refills: 11 | Status: SHIPPED | OUTPATIENT
Start: 2018-06-03 | End: 2019-06-12 | Stop reason: SDUPTHER

## 2018-06-03 RX ORDER — CARVEDILOL 6.25 MG/1
6.25 TABLET ORAL 2 TIMES DAILY WITH MEALS
Qty: 60 TAB | Refills: 11 | Status: SHIPPED | OUTPATIENT
Start: 2018-06-03 | End: 2019-06-12 | Stop reason: SDUPTHER

## 2018-06-03 RX ORDER — SPIRONOLACTONE 25 MG/1
25 TABLET ORAL DAILY
Qty: 30 TAB | Refills: 11 | Status: SHIPPED | OUTPATIENT
Start: 2018-06-04 | End: 2019-06-12

## 2018-06-03 RX ORDER — NITROGLYCERIN 0.4 MG/1
0.4 TABLET SUBLINGUAL
Qty: 1 BOTTLE | Refills: 5 | Status: SHIPPED | OUTPATIENT
Start: 2018-06-03 | End: 2019-06-12 | Stop reason: SDUPTHER

## 2018-06-03 RX ORDER — PANTOPRAZOLE SODIUM 40 MG/1
40 TABLET, DELAYED RELEASE ORAL
Qty: 30 TAB | Refills: 11 | Status: SHIPPED | OUTPATIENT
Start: 2018-06-04 | End: 2018-07-26

## 2018-06-03 RX ORDER — LISINOPRIL 10 MG/1
10 TABLET ORAL DAILY
Qty: 30 TAB | Refills: 11 | Status: SHIPPED | OUTPATIENT
Start: 2018-06-04 | End: 2018-11-14

## 2018-06-03 RX ADMIN — TICAGRELOR 90 MG: 90 TABLET ORAL at 08:51

## 2018-06-03 RX ADMIN — PANTOPRAZOLE SODIUM 40 MG: 40 TABLET, DELAYED RELEASE ORAL at 08:51

## 2018-06-03 RX ADMIN — ASPIRIN 81 MG: 81 TABLET, COATED ORAL at 08:51

## 2018-06-03 RX ADMIN — Medication 10 ML: at 05:48

## 2018-06-03 RX ADMIN — LISINOPRIL 10 MG: 5 TABLET ORAL at 08:51

## 2018-06-03 RX ADMIN — CARVEDILOL 6.25 MG: 6.25 TABLET, FILM COATED ORAL at 08:51

## 2018-06-03 RX ADMIN — SPIRONOLACTONE 25 MG: 25 TABLET ORAL at 08:51

## 2018-06-03 NOTE — PROGRESS NOTES
Bedside and Verbal shift change report given to Anna La RN (oncoming nurse) by self Sandra Overlie nurse). Report included the following information SBAR, Kardex, MAR and Recent Results.

## 2018-06-03 NOTE — DISCHARGE INSTRUCTIONS
The patient is being discharged home in stable condition on a low saturated fat, low cholesterol and low salt diet. The patient is instructed to advance activities as tolerated to the limit of fatigue or shortness of breath. The patient is instructed to avoid all heavy lifting, straining, stooping or squatting for 3-5 days. The patient is instructed to watch the cath site for bleeding/oozing; if seen, the patient is instructed to apply firm pressure with a clean cloth and call [address] Cardiology at [phone]. The patient is instructed to watch for signs of infection which include: increasing area of redness, fever/hot to touch or purulent drainage at the catheterization site. The patient is instructed not to soak in a bathtub for 7-10 days, but is cleared to shower. The patient is instructed to call the office or return to the ER for immediate evaluation for any shortness of breath or chest pain not relieved by NTG. Percutaneous Coronary Intervention: What to Expect at [facility]    Percutaneous coronary intervention (PCI) is the name for procedures that are used to open a narrowed or blocked coronary artery. The two most common PCI procedures are coronary angioplasty and coronary stent placement. Your groin or arm may have a bruise and feel sore for a day or two after a percutaneous coronary intervention (PCI). You can do light activities around the house, but nothing strenuous for several days. This care sheet gives you a general idea about how long it will take for you to recover. But each person recovers at a different pace. Follow the steps below to get better as quickly as possible. How can you care for yourself at home? Activity  ? · If the doctor gave you a sedative:  ¨ For 24 hours, don't do anything that requires attention to detail. It takes time for the medicine's effects to completely wear off. ¨ For your safety, do not drive or operate any machinery that could be dangerous.  Wait until the medicine wears off and you can think clearly and react easily. ? · Do not do strenuous exercise and do not lift, pull, or push anything heavy until your doctor says it is okay. This may be for a day or two. You can walk around the house and do light activity, such as cooking. ? · If the catheter was placed in your groin, try not to walk up stairs for the first couple of days. ? · If the catheter was placed in your arm near your wrist, do not bend your wrist deeply for the first couple of days. Be careful using your hand to get into and out of a chair or bed. ? · Carry your stent identification card with you at all times. ? · If your doctor recommends it, get more exercise. Walking is a good choice. Bit by bit, increase the amount you walk every day. Try for at least 30 minutes on most days of the week. Diet  ? · Drink plenty of fluids to help your body flush out the dye. If you have kidney, heart, or liver disease and have to limit fluids, talk with your doctor before you increase the amount of fluids you drink. ? · Keep eating a heart-healthy diet that has lots of fruits, vegetables, and whole grains. If you have not been eating this way, talk to your doctor. You also may want to talk to a dietitian. This expert can help you to learn about healthy foods and plan meals. Medicines  ? · Your doctor will tell you if and when you can restart your medicines. He or she will also give you instructions about taking any new medicines. ? · If you take blood thinners, such as warfarin (Coumadin), clopidogrel (Plavix), or aspirin, be sure to talk to your doctor. He or she will tell you if and when to start taking those medicines again. Make sure that you understand exactly what your doctor wants you to do.   ? · Your doctor will prescribe blood-thinning medicines. You will likely take aspirin plus another antiplatelet, such as clopidogrel (Plavix).  It is very important that you take these medicines exactly as directed. These medicines help keep the coronary artery open and reduce your risk of a heart attack. ? · Call your doctor if you think you are having a problem with your medicine. ?Care of the catheter site  ? · For 1 or 2 days, keep a bandage over the spot where the catheter was inserted. The bandage probably will fall off in this time. ? · Put ice or a cold pack on the area for 10 to 20 minutes at a time to help with soreness or swelling. Put a thin cloth between the ice and your skin. ? · You may shower 24 to 48 hours after the procedure, if your doctor okays it. Pat the incision dry. ? · Do not soak the catheter site until it is healed. Don't take a bath for 1 week, or until your doctor tells you it isokay. Follow-up care is a key part of your treatment and safety. Be sure to make and go to all appointments, and call your doctor if you are having problems. It's also a good idea to know your test results and keep a list of the medicines you take. When should you call for help? Call 911 anytime you think you may need emergency care. For example, call if:  ? · You passed out (lost consciousness). ? · You have severe trouble breathing. ? · You have sudden chest pain and shortness of breath, or you cough up blood. ? · You have symptoms of a heart attack, such as:  ¨ Chest pain or pressure. ¨ Sweating. ¨ Shortness of breath. ¨ Nausea or vomiting. ¨ Pain that spreads from the chest to the neck, jaw, or one or both shoulders or arms. ¨ Dizziness or lightheadedness. ¨ A fast or uneven pulse. After calling 911, chew 1 adult-strength aspirin. Wait for an ambulance. Do not try to drive yourself. ? · You have been diagnosed with angina, and you have angina symptoms that do not go away with rest or are not getting better within 5 minutes after you take one dose of nitroglycerin.    ?Call your doctor now or seek immediate medical care if:  ? · You are bleeding from the area where the catheter was put in your artery. ? · You have a fast-growing, painful lump at the catheter site. ? · You have signs of infection, such as:  ¨ Increased pain, swelling, warmth, or redness. ¨ Red streaks leading from the catheter site. ¨ Pus draining from the catheter site. ¨ A fever. ? · Your leg or arm looks blue or feels cold, numb, or tingly. ? Watch closely for changes in your health, and be sure to contact your doctor if you have any problems. Cardiac Catheterization/Angiography Discharge Instructions    *Check the puncture site frequently for swelling or bleeding. If you see any bleeding, lie down and apply pressure over the area with a clean town or washcloth. Notify your doctor for any redness, swelling, drainage or oozing from the puncture site. Notify your doctor for any fever or chills. *If the leg or arm with the puncture becomes cold, numb or painful, call [facility]iology at  [phone]. *Activity should be limited for the next 48 hours. Climb stairs as little as possible and avoid any stooping, bending or strenuous activity for 48 hours. No heavy lifting (anything over 10 pounds) for three days. *Do not drive for 48 hours. *You may resume your usual diet. Drink more fluids than usual.    *Have a responsible person drive you home and stay with you for at least 24 hours after your heart catheterization/angiography. *You may remove the bandage from your Right Arm in 24 hours. You may shower in 24 hours. No tub baths, hot tubs or swimming for one week. Do not place any lotions, creams, powders, ointments over the puncture site for one week. You may place a clean band-aid over the puncture site each day for 5 days. Change this daily. Heart-Healthy Diet: Care Instructions  Your Care Instructions    A heart-healthy diet has lots of vegetables, fruits, nuts, beans, and whole grains, and is low in salt.  It limits foods that are high in saturated fat, such as meats, cheeses, and fried foods. It may be hard to change your diet, but even small changes can lower your risk of heart attack and heart disease. Follow-up care is a key part of your treatment and safety. Be sure to make and go to all appointments, and call your doctor if you are having problems. It's also a good idea to know your test results and keep a list of the medicines you take. How can you care for yourself at home? Watch your portions  · Learn what a serving is. A \"serving\" and a \"portion\" are not always the same thing. Make sure that you are not eating larger portions than are recommended. For example, a serving of pasta is ½ cup. A serving size of meat is 2 to 3 ounces. A 3-ounce serving is about the size of a deck of cards. Measure serving sizes until you are good at [place]" them. Keep in mind that restaurants often serve portions that are 2 or 3 times the size of one serving. · To keep your energy level up and keep you from feeling hungry, eat often but in smaller portions. · Eat only the number of calories you need to stay at a healthy weight. If you need to lose weight, eat fewer calories than your body burns (through exercise and other physical activity). Eat more fruits and vegetables  · Eat a variety of fruit and vegetables every day. Dark green, deep orange, red, or yellow fruits and vegetables are especially good for you. Examples include spinach, carrots, peaches, and berries. · Keep carrots, celery, and other veggies handy for snacks. Buy fruit that is in season and store it where you can see it so that you will be tempted to eat it. · Cook dishes that have a lot of veggies in them, such as stir-fries and soups. Limit saturated and trans fat  · Read food labels, and try to avoid saturated and trans fats. They increase your risk of heart disease. Trans fat is found in many processed foods such as cookies and crackers. · Use olive or canola oil when you cook.  Try cholesterol-lowering spreads, such as Benecol or Take Control. · Bake, broil, grill, or steam foods instead of frying them. · Choose lean meats instead of high-fat meats such as hot dogs and sausages. Cut off all visible fat when you prepare meat. · Eat fish, skinless poultry, and meat alternatives such as soy products instead of high-fat meats. Soy products, such as tofu, may be especially good for your heart. · Choose low-fat or fat-free milk and dairy products. Eat fish  · Eat at least two servings of fish a week. Certain fish, such as salmon and tuna, contain omega-3 fatty acids, which may help reduce your risk of heart attack. Eat foods high in fiber  · Eat a variety of grain products every day. Include whole-grain foods that have lots of fiber and nutrients. Examples of whole-grain foods include oats, whole wheat bread, and brown rice. · Buy whole-grain breads and cereals, instead of white bread or pastries. Limit salt and sodium  · Limit how much salt and sodium you eat to help lower your blood pressure. · Taste food before you salt it. Add only a little salt when you think you need it. With time, your taste buds will adjust to less salt. · Eat fewer snack items, fast foods, and other high-salt, processed foods. Check food labels for the amount of sodium in packaged foods. · Choose low-sodium versions of canned goods (such as soups, vegetables, and beans). Limit sugar  · Limit drinks and foods with added sugar. These include candy, desserts, and soda pop. Limit alcohol  · Limit alcohol to no more than 2 drinks a day for men and 1 drink a day for women. Too much alcohol can cause health problems. When should you call for help? Watch closely for changes in your health, and be sure to contact your doctor if:  ? · You would like help planning heart-healthy meals. Where can you learn more? Go to http://arsen-cam.info/.   Enter V137 in the search box to learn more about \"Heart-Healthy Diet: Care Instructions. \"  Current as of: September 21, 2016  Content Version: 11.4  © 3830-2436 [employer]. Care instructions adapted under license by [employer] (which disclaims liability or warranty for this information). If you have questions about a medical condition or this instruction, always ask your healthcare professional. [address] any warranty or liability for your use of this information. Reducing Heart Attack Risk With Daily Medicine: Care Instructions  Your Care Instructions    Heart disease is the number one cause of death. If you are at risk for heart disease, there are many medicines that can reduce your risk. These include:  · ACE inhibitors. These are a type of blood pressure medicine. They can reduce the risk of heart attacks and strokes if you are at high risk. · Statin medicines. These lower cholesterol. They can also reduce the risk of heart disease and strokes. · Aspirin. It can help certain people lower their risk of a heart attack or stroke. · Beta-blocker medicines. These are a type of blood pressure and heart medicine. They can reduce the chance of early death if you have had a heart attack. All medicines can cause side effects. So it is important to understand the pros and cons of any medicine you take. It is also important to take your medicines exactly as your doctor tells you to. Follow-up care is a key part of your treatment and safety. Be sure to make and go to all appointments, and call your doctor if you are having problems. It's also a good idea to know your test results and keep a list of the medicines you take. ACE inhibitors  ACE (angiotensin-converting enzyme) inhibitors are used for three main reasons. They lower blood pressure, protect the kidneys, and prevent heart attacks and strokes. Examples include benazepril (Lotensin), lisinopril (Prinivil, Zestril), and ramipril (Altace).   Before you start taking an ACE inhibitor, make sure your doctor knows if:  · You are taking a water pill (diuretic). · You are taking potassium pills or using salt substitutes. · You are pregnant or breastfeeding. · You have had a kidney transplant or other kidney problems. ACE inhibitors can cause side effects. Call your doctor right away if you have:  · Trouble breathing. · Swelling in your face, head, neck, or tongue. · Dizziness or lightheadedness. · A dry cough. Statins  Statins lower cholesterol. Examples include atorvastatin (Lipitor), lovastatin (Mevacor), pravastatin (Pravachol), and simvastatin (Zocor). Before you start taking a statin, make sure your doctor knows if:  · You have had a kidney transplant or other kidney problems. · You have liver disease. · You take any other prescription medicine, over-the-counter medicine, vitamins, supplements, or herbal remedies. · You are pregnant or breastfeeding. Statins can cause side effects. Call your doctor right away if you have:  · New, severe muscle aches. · Brown urine. Aspirin  Taking an aspirin every day can lower your risk for a heart attack. A heart attack occurs when a blood vessel in the heart gets blocked. When this happens, oxygen can't get to the heart muscle, and part of the heart dies. Aspirin can help prevent blood clots that can block the blood vessels. Talk to your doctor before you start taking aspirin every day. He or she may recommend that you take one low-dose aspirin (81 mg) tablet each day, with a meal and a full glass of water. Taking aspirin isn't right for everyone. This is because it can cause serious bleeding. And you may not be able to use aspirin if you:  · Have asthma. · Have an ulcer or other stomach problem. · Take some other medicine (called a blood thinner) that prevents blood clots. · Are allergic to aspirin. Before having a surgery or procedure, tell your doctor or dentist that you take aspirin.  He or she will tell you if you should stop taking aspirin beforehand. Make sure that you understand exactly what your doctor wants you to do. Aspirin can cause side effects. Call your doctor right away if you have:  · Unusual bleeding or bruising. · Nausea, vomiting, or heartburn. · Black or bloody stools. Beta-blockers  Beta-blockers are used for three main reasons. They lower blood pressure, relieve angina symptoms (such as chest pain or pressure), and reduce the chances of a second heart attack. They include atenolol (Tenormin), carvedilol (Coreg), and metoprolol (Lopressor). Before you start taking a beta-blocker, make sure your doctor knows if you have:  · Severe asthma or frequent asthma attacks. · A very slow pulse (less than 55 beats a minute). Beta-blockers can cause side effects. Call your doctor right away if you have:  · Wheezing or trouble breathing. · Dizziness or lightheadedness. · Asthma that gets worse. When should you call for help? Watch closely for changes in your health, and be sure to contact your doctor if you have any problems. Heart Attack: Care Instructions  Your Care Instructions    A heart attack (myocardial infarction, or MI) occurs when one or more of the coronary arteries, which supply the heart with oxygen-rich blood, is blocked. A blockage usually occurs when plaque inside the artery breaks open and a blood clot forms in the artery. After a heart attack, you may be worried about your future. Over the next several weeks, your heart will start to heal. Though it can be hard to break old habits, you can prevent another heart attack by making some lifestyle changes and by taking medicines. You may use this information for ideas about what to do at home to speed your recovery. Follow-up care is a key part of your treatment and safety. Be sure to make and go to all appointments, and call your doctor if you are having problems.  It's also a good idea to know your test results and keep a list of the medicines you take.  How can you care for yourself at home? Activity  ? · Until your doctor says it is okay, do not do strenuous exercise. And do not lift, pull, or push anything heavy. Ask your doctor what types of activities are safe for you. ? · If your doctor has not set you up with a cardiac rehabilitation (rehab) program, talk to him or her about whether that is right for you. Cardiac rehab includes supervised exercise. It also includes help with diet and lifestyle changes and emotional support. It may reduce your risk of future heart problems. ? · Increase your activities slowly. Take short rest breaks when you get tired. ? · If your doctor recommends it, get more exercise. Walking is a good choice. Bit by bit, increase the amount you walk every day. Try for at least 30 minutes on most days of the week. You also may want to swim, bike, or do other activities. Talk with your doctor before you start an exercise program to make sure it is safe for you. ? · Ask your doctor when you can drive, go back to work, and do other daily activities again. ? · You can have sex as soon as you feel ready for it. Often this means when you can easily walk around or climb stairs. Talk with your doctor if you have any concerns. If you are taking nitroglycerin, do not take erection-enhancing medicine such as sildenafil (Viagra), tadalafil (Cialis), or vardenafil (Levitra) . ? Lifestyle changes  ? · Do not smoke. Smoking increases your risk of another heart attack. If you need help quitting, talk to your doctor about stop-smoking programs and medicines. These can increase your chances of quitting for good. ? · Eat a heart-healthy diet that is low in saturated fat and salt, and is full of fruits, vegetables and whole-grains. Eat at least two servings of fish each week. You may get more details about how to eat healthy. But these tips can help you get started. ? · Stay at a healthy weight, or lose weight if you need to. Medicines  ? · Be safe with medicines. Take your medicines exactly as prescribed. Call your doctor if you think you are having a problem with your medicine. You will get more details on the specific medicines your doctor prescribes. Do not stop taking your medicine unless your doctor tells you to. Not taking your medicine might raise your risk of having another heart attack. ? · You may need several medicines to help lower your risk of another heart attack. These include:  ¨ Blood pressure medicines such as angiotensin-converting enzyme (ACE) inhibitors, ARBs (angiotensin II receptor blockers), and beta-blockers. ¨ Cholesterol medicine called statins. ¨ Aspirin and other blood thinners. These prevent blood clots that can cause a heart attack. ? · If your doctor has given you nitroglycerin, keep it with you at all times. If you have angina symptoms, such as chest pain or pressure, sit down and rest. Take the first dose of nitroglycerin as directed. If symptoms get worse or are not getting better within 5 minutes, call 911 right away. Stay on the phone. The emergency  will tell you what to do. ? · Do not take any over-the-counter medicines, vitamins, or herbal products without talking to your doctor first.   ?Staying healthy  ? · Manage other health conditions such as high blood pressure and diabetes. ? · Avoid colds and flu. Get a pneumococcal vaccine shot. If you have had one before, ask your doctor whether you need another dose. Get the flu vaccine every year. If you must be around people with colds or flu, wash your hands often. ? · Be sure to tell your doctor about any angina symptoms you have had, even if they went away. Pay attention to your angina symptoms. Know what is typical for you and learn how to control it. Know when to call for help. ? · Talk to your family, friends, or a counselor about your feelings. It is normal to feel frightened, angry, hopeless, helpless, and even guilty. Talking openly about bad feelings can help you cope. If you have symptoms of depression, talk to your doctor. When should you call for help? Call 911 anytime you think you may need emergency care. For example, call if:  ? · You have symptoms of a heart attack. These may include:  ¨ Chest pain or pressure, or a strange feeling in the chest.  ¨ Sweating. ¨ Shortness of breath. ¨ Nausea or vomiting. ¨ Pain, pressure, or a strange feeling in the back, neck, jaw, or upper belly or in one or both shoulders or arms. ¨ Lightheadedness or sudden weakness. ¨ A fast or irregular heartbeat. After you call 911, the  may tell you to chew 1 adult-strength or 2 to 4 low-dose aspirin. Wait for an ambulance. Do not try to drive yourself. ? · You have angina symptoms (such as chest pain or pressure) that do not go away with rest or are not getting better within 5 minutes after you take a dose of nitroglycerin. ? · You passed out (lost consciousness). ? · You feel like you are having another heart attack. ?Call your doctor now or seek immediate medical care if:  ? · You are having angina symptoms, such as chest pain or pressure, more often than usual, or the symptoms are different or worse than usual.   ? · You have new or increased shortness of breath. ? · You are dizzy or lightheaded, or you feel like you may faint. ? Watch closely for changes in your health, and be sure to contact your doctor if you have any problems. DISCHARGE SUMMARY from Nurse    PATIENT INSTRUCTIONS:    After general anesthesia or intravenous sedation, for 24 hours or while taking prescription Narcotics:  · Limit your activities  · Do not drive and operate hazardous machinery  · Do not make important personal or business decisions  · Do  not drink alcoholic beverages  · If you have not urinated within 8 hours after discharge, please contact your surgeon on call.     Report the following to your surgeon:  · Excessive pain, swelling, redness or odor of or around the surgical area  · Temperature over 100.5  · Nausea and vomiting lasting longer than 4 hours or if unable to take medications  · Any signs of decreased circulation or nerve impairment to extremity: change in color, persistent  numbness, tingling, coldness or increase pain  · Any questions    What to do at Home:    *  Please give a list of your current medications to your Primary Care Provider. *  Please update this list whenever your medications are discontinued, doses are      changed, or new medications (including over-the-counter products) are added. *  Please carry medication information at all times in case of emergency situations. These are general instructions for a healthy lifestyle:    No smoking/ No tobacco products/ Avoid exposure to second hand smoke  Surgeon General's Warning:  Quitting smoking now greatly reduces serious risk to your health. Obesity, smoking, and sedentary lifestyle greatly increases your risk for illness    A healthy diet, regular physical exercise & weight monitoring are important for maintaining a healthy lifestyle    You may be retaining fluid if you have a history of heart failure or if you experience any of the following symptoms:  Weight gain of 3 pounds or more overnight or 5 pounds in a week, increased swelling in our hands or feet or shortness of breath while lying flat in bed. Please call your doctor as soon as you notice any of these symptoms; do not wait until your next office visit. Recognize signs and symptoms of STROKE:    F-face looks uneven    A-arms unable to move or move unevenly    S-speech slurred or non-existent    T-time-call 911 as soon as signs and symptoms begin-DO NOT go       Back to bed or wait to see if you get better-TIME IS BRAIN. Warning Signs of HEART ATTACK     Call 911 if you have these symptoms:   Chest discomfort.  Most heart attacks involve discomfort in the center of the chest that lasts more than a few minutes, or that goes away and comes back. It can feel like uncomfortable pressure, squeezing, fullness, or pain.  Discomfort in other areas of the upper body. Symptoms can include pain or discomfort in one or both arms, the back, neck, jaw, or stomach.  Shortness of breath with or without chest discomfort.  Other signs may include breaking out in a cold sweat, nausea, or lightheadedness. Don't wait more than five minutes to call 911 - MINUTES MATTER! Fast action can save your life. Calling 911 is almost always the fastest way to get lifesaving treatment. Emergency Medical Services staff can begin treatment when they arrive -- up to an hour sooner than if someone gets to the hospital by car. The discharge information has been reviewed with the patient. The patient verbalized understanding. Discharge medications reviewed with the patient and appropriate educational materials and side effects teaching were provided.   ___________________________________________________________________________________________________________________________________

## 2018-06-03 NOTE — PROGRESS NOTES
Bedside and Verbal shift change report given to self (oncoming nurse) by Blake Barfield RN (offgoing nurse). Report included the following information SBAR, Kardex, MAR and Recent Results.

## 2018-06-03 NOTE — PROGRESS NOTES
Discharge instructions reviewed with patient. Prescriptions given for coreg, crestor, lisinopril, brilinta, aldactone, protonix, nitrostat and med info sheets provided for all new medications. Opportunity for questions provided. Patient voiced understanding of all discharge instructions. IV and telemetry monitor removed by primary RN.

## 2018-06-04 ENCOUNTER — PATIENT OUTREACH (OUTPATIENT)
Dept: CASE MANAGEMENT | Age: 83
End: 2018-06-04

## 2018-06-06 NOTE — PROGRESS NOTES
Downtime progress note entry for [facility] :  Jennifer Levy RN      Transition of Care Discharge Follow-up Questionnaire   Date/Time of Call:   Patient name:   :   N:   ISABEL#   2018 @ 3:27pm  Tiffanie Guzmán  1935  [id number]  [id number] 1st call   What was the patient hospitalized for? Heart blockage x 3   Does the patient understand his/her diagnosis and/or treatment and what happened during the hospitalization? Yes   Did the patient receive discharge instructions? Yes   Review any discharge instructions (see notes in [facility]). Ask patient if they understand these. Do they have any questions? 18 @ 3:27pm- Called to establish relationship. RN Care  role explained. My contact information and the [address] [place] [id number] Dr number given. Member states he was having chest pain after supper on  but it subsided, Friday morning it returned, he called his PCP and they advised him to go to ER. Within one hour of arriving, he had 3 stents placed. BP meds were changed and he has all of his prescriptions filled . He has a follow up appt with Cardiologist on 18. Does not have a 7 day f/u with PCP, states he will see him in October. Educated him about the importance of taking all of his medications as prescribed and to call Cardiologist with any side effects or issues with medications. Advised to rise slowly from a lying to a sitting and sitting to standing position to prevent orthostatic hypotension. He voiced understanding. Advised him to call 911 if he develops chest, jaw or arm pain, any sudden shortness of breath, or dyspnea. He voiced understanding. Made an appointment to call him back this week to see how things are going. Were home services ordered (nursing, PT, OT, ST, etc.)? No   If so, has the first visit occurred? If not, why? (Assist with coordination of services if necessary.) n/a   Was any DME ordered? None   If so, has it been received?   If not, why?  (Assist with coordination of arranging DME orders if necessary.) N/A   Complete a review of all medications (new, continued and discontinued meds per the D/C instructions and medication tab in [facility]). Medications were changed. He was not sure of all the names   Were all new prescriptions filled? If not, why?  (Assist with obtainment of medications if necessary.) Yes   Does the patient understand the purpose and dosing instructions for all medications? (If patient has questions, provide explanation and education.) Yes   Does the patient have any problems in performing ADLs? (If patient is unable to perform ADLs - what is the limiting factor(s)? Do they have a support system that can assist? If no support system is present, discuss possible assistance that they may be able to obtain.) No problems    Does the patient have all follow-up appointments scheduled? 7 day f/up with PCP?    7-14 day f/up with specialist?    If f/up has not been made - what actions has the care coordinator made to accomplish this? Has transportation been arranged? [facility] follow-up should be within 7 days of discharge; all others should have PCP follow-up within 7 days of discharge; follow-ups with other specialists should be within 7-14 days of discharge.) Cardiologist on 6/13/18. Wife transports   Any other questions or concerns expressed by the patient? None   Schedule next appointment with LEENA AYALA Coordinator or refer to RN Case Manager/  per the workflow guidelines. When is care coordinators next follow-up call scheduled? If referred for CCM - what RN care manager was the referral assigned?  Week of June 4, 2018   LEENA AYALA Call Completed By: Yissel Hooker RN

## 2018-06-07 ENCOUNTER — PATIENT OUTREACH (OUTPATIENT)
Dept: CASE MANAGEMENT | Age: 83
End: 2018-06-07

## 2018-06-08 NOTE — PROGRESS NOTES
Downtime progress note entry for [facility] :  Dante Hsieh RN    Care  Follow up Outreach Note   Outreach type:  Patient name:  :  MRN:  H: 2nd call  Lenord Cooks  1935  [id number]  [id number]   Date of follow up  . 18 @ 1:41pm     Reason for follow-up:   Heart blockage x 3   Disease specific complaints/issues:      Patient progress towards goals set from last contact:   18 @ 1:41pm- Called to check on member, no answer, left a voicemail with my contact information. Has patient attended any PCP or specialist follow-up appointments since last contact? What was outcome of appointment? When is next follow-up scheduled? Review medications. Any medication changes since last outreach? Does patient have any questions or issues related to their medications? Home health active? If yes - any issue? Progress? Referrals needed?  (SW, Diabetes education, HH, etc.)    Other issues/Miscellaneous?  (Transportation, access to meals, ability to perform ADLs, adequate caregiver support, etc.)    Next Outreach Scheduled: Week of 2018     Next Steps/Goals:   Return Care  calls   Care  completing call: Dante Hsieh RN

## 2018-06-13 ENCOUNTER — APPOINTMENT (OUTPATIENT)
Dept: GENERAL RADIOLOGY | Age: 83
DRG: 193 | End: 2018-06-13
Attending: EMERGENCY MEDICINE
Payer: MEDICARE

## 2018-06-13 ENCOUNTER — HOSPITAL ENCOUNTER (INPATIENT)
Age: 83
LOS: 3 days | Discharge: HOME | DRG: 193 | End: 2018-06-16
Attending: EMERGENCY MEDICINE | Admitting: HOSPITALIST
Payer: MEDICARE

## 2018-06-13 ENCOUNTER — APPOINTMENT (OUTPATIENT)
Dept: CT IMAGING | Age: 83
DRG: 193 | End: 2018-06-13
Attending: EMERGENCY MEDICINE
Payer: MEDICARE

## 2018-06-13 DIAGNOSIS — J18.9 HAP (HOSPITAL-ACQUIRED PNEUMONIA): Primary | ICD-10-CM
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
## 2018-06-13 DIAGNOSIS — J18.9 HCAP (HEALTHCARE-ASSOCIATED PNEUMONIA): ICD-10-CM

## 2018-06-13 DIAGNOSIS — R59.0 MEDIASTINAL ADENOPATHY: ICD-10-CM

## 2018-06-13 DIAGNOSIS — Z86.718 H/O THROMBOEMBOLISM: ICD-10-CM

## 2018-06-13 PROBLEM — I25.10 CORONARY ARTERY DISEASE INVOLVING NATIVE CORONARY ARTERY OF NATIVE HEART: Status: ACTIVE | Noted: 2018-06-13

## 2018-06-13 PROBLEM — R07.9 CHEST PAIN: Status: ACTIVE | Noted: 2018-06-13

## 2018-06-13 PROBLEM — H10.9 BILATERAL CONJUNCTIVITIS: Status: ACTIVE | Noted: 2018-06-13

## 2018-06-13 PROBLEM — I25.5 ISCHEMIC CARDIOMYOPATHY: Status: ACTIVE | Noted: 2018-06-01

## 2018-06-13 LAB
ALBUMIN SERPL-MCNC: 3.4 G/DL (ref 3.2–4.6)
ALBUMIN/GLOB SERPL: 0.8 {RATIO} (ref 1.2–3.5)
ALP SERPL-CCNC: 84 U/L (ref 50–136)
ALT SERPL-CCNC: 29 U/L (ref 12–65)
ANION GAP SERPL CALC-SCNC: 10 MMOL/L (ref 7–16)
AST SERPL-CCNC: 31 U/L (ref 15–37)
BASOPHILS # BLD: 0 K/UL (ref 0–0.2)
BASOPHILS NFR BLD: 0 % (ref 0–2)
BILIRUB SERPL-MCNC: 0.6 MG/DL (ref 0.2–1.1)
BUN SERPL-MCNC: 18 MG/DL (ref 8–23)
CALCIUM SERPL-MCNC: 9.2 MG/DL (ref 8.3–10.4)
CHLORIDE SERPL-SCNC: 95 MMOL/L (ref 98–107)
CO2 SERPL-SCNC: 25 MMOL/L (ref 21–32)
CREAT SERPL-MCNC: 1.24 MG/DL (ref 0.8–1.5)
DIFFERENTIAL METHOD BLD: ABNORMAL
EOSINOPHIL # BLD: 0.2 K/UL (ref 0–0.8)
EOSINOPHIL NFR BLD: 1 % (ref 0.5–7.8)
ERYTHROCYTE [DISTWIDTH] IN BLOOD BY AUTOMATED COUNT: 12.3 % (ref 11.9–14.6)
GLOBULIN SER CALC-MCNC: 4.5 G/DL (ref 2.3–3.5)
GLUCOSE SERPL-MCNC: 122 MG/DL (ref 65–100)
HCT VFR BLD AUTO: 37 % (ref 41.1–50.3)
HGB BLD-MCNC: 13.1 G/DL (ref 13.6–17.2)
IMM GRANULOCYTES # BLD: 0.2 K/UL (ref 0–0.5)
IMM GRANULOCYTES NFR BLD AUTO: 1 % (ref 0–5)
LACTATE BLD-SCNC: 0.8 MMOL/L (ref 0.5–1.9)
LYMPHOCYTES # BLD: 2.2 K/UL (ref 0.5–4.6)
LYMPHOCYTES NFR BLD: 14 % (ref 13–44)
MCH RBC QN AUTO: 31.7 PG (ref 26.1–32.9)
MCHC RBC AUTO-ENTMCNC: 35.4 G/DL (ref 31.4–35)
MCV RBC AUTO: 89.6 FL (ref 79.6–97.8)
MONOCYTES # BLD: 1.2 K/UL (ref 0.1–1.3)
MONOCYTES NFR BLD: 8 % (ref 4–12)
NEUTS SEG # BLD: 11.9 K/UL (ref 1.7–8.2)
NEUTS SEG NFR BLD: 77 % (ref 43–78)
PLATELET # BLD AUTO: 277 K/UL (ref 150–450)
PLATELET COMMENTS,PCOM: ADEQUATE
PMV BLD AUTO: 8.9 FL (ref 10.8–14.1)
POTASSIUM SERPL-SCNC: 4.6 MMOL/L (ref 3.5–5.1)
PROCALCITONIN SERPL-MCNC: 0.1 NG/ML
PROT SERPL-MCNC: 7.9 G/DL (ref 6.3–8.2)
RBC # BLD AUTO: 4.13 M/UL (ref 4.23–5.67)
RBC MORPH BLD: ABNORMAL
SODIUM SERPL-SCNC: 130 MMOL/L (ref 136–145)
TROPONIN I SERPL-MCNC: 0.79 NG/ML (ref 0.02–0.05)
WBC # BLD AUTO: 15.5 K/UL (ref 4.3–11.1)
WBC MORPH BLD: ABNORMAL

## 2018-06-13 PROCEDURE — 96375 TX/PRO/DX INJ NEW DRUG ADDON: CPT | Performed by: EMERGENCY MEDICINE

## 2018-06-13 PROCEDURE — 85025 COMPLETE CBC W/AUTO DIFF WBC: CPT | Performed by: HOSPITALIST

## 2018-06-13 PROCEDURE — 99284 EMERGENCY DEPT VISIT MOD MDM: CPT | Performed by: EMERGENCY MEDICINE

## 2018-06-13 PROCEDURE — 71260 CT THORAX DX C+: CPT

## 2018-06-13 PROCEDURE — 83735 ASSAY OF MAGNESIUM: CPT | Performed by: HOSPITALIST

## 2018-06-13 PROCEDURE — 87040 BLOOD CULTURE FOR BACTERIA: CPT | Performed by: EMERGENCY MEDICINE

## 2018-06-13 PROCEDURE — 96374 THER/PROPH/DIAG INJ IV PUSH: CPT | Performed by: EMERGENCY MEDICINE

## 2018-06-13 PROCEDURE — 93005 ELECTROCARDIOGRAM TRACING: CPT | Performed by: EMERGENCY MEDICINE

## 2018-06-13 PROCEDURE — 84100 ASSAY OF PHOSPHORUS: CPT | Performed by: HOSPITALIST

## 2018-06-13 PROCEDURE — 74011250636 HC RX REV CODE- 250/636: Performed by: EMERGENCY MEDICINE

## 2018-06-13 PROCEDURE — 80053 COMPREHEN METABOLIC PANEL: CPT | Performed by: EMERGENCY MEDICINE

## 2018-06-13 PROCEDURE — 82550 ASSAY OF CK (CPK): CPT | Performed by: HOSPITALIST

## 2018-06-13 PROCEDURE — 74011000258 HC RX REV CODE- 258: Performed by: EMERGENCY MEDICINE

## 2018-06-13 PROCEDURE — 65660000000 HC RM CCU STEPDOWN

## 2018-06-13 PROCEDURE — 96361 HYDRATE IV INFUSION ADD-ON: CPT | Performed by: EMERGENCY MEDICINE

## 2018-06-13 PROCEDURE — 84145 PROCALCITONIN (PCT): CPT | Performed by: EMERGENCY MEDICINE

## 2018-06-13 PROCEDURE — 74011636320 HC RX REV CODE- 636/320: Performed by: EMERGENCY MEDICINE

## 2018-06-13 PROCEDURE — 86140 C-REACTIVE PROTEIN: CPT | Performed by: HOSPITALIST

## 2018-06-13 PROCEDURE — 83605 ASSAY OF LACTIC ACID: CPT

## 2018-06-13 PROCEDURE — 71046 X-RAY EXAM CHEST 2 VIEWS: CPT

## 2018-06-13 PROCEDURE — 36415 COLL VENOUS BLD VENIPUNCTURE: CPT | Performed by: HOSPITALIST

## 2018-06-13 PROCEDURE — 84484 ASSAY OF TROPONIN QUANT: CPT | Performed by: EMERGENCY MEDICINE

## 2018-06-13 RX ORDER — PANTOPRAZOLE SODIUM 40 MG/1
40 TABLET, DELAYED RELEASE ORAL
Status: DISCONTINUED | OUTPATIENT
Start: 2018-06-14 | End: 2018-06-16 | Stop reason: HOSPADM

## 2018-06-13 RX ORDER — ACETAMINOPHEN 325 MG/1
650 TABLET ORAL
Status: DISCONTINUED | OUTPATIENT
Start: 2018-06-13 | End: 2018-06-16 | Stop reason: HOSPADM

## 2018-06-13 RX ORDER — SODIUM CHLORIDE 0.9 % (FLUSH) 0.9 %
5-10 SYRINGE (ML) INJECTION AS NEEDED
Status: DISCONTINUED | OUTPATIENT
Start: 2018-06-13 | End: 2018-06-16 | Stop reason: HOSPADM

## 2018-06-13 RX ORDER — ENOXAPARIN SODIUM 100 MG/ML
40 INJECTION SUBCUTANEOUS EVERY 24 HOURS
Status: DISCONTINUED | OUTPATIENT
Start: 2018-06-13 | End: 2018-06-13 | Stop reason: SDUPTHER

## 2018-06-13 RX ORDER — ENOXAPARIN SODIUM 100 MG/ML
40 INJECTION SUBCUTANEOUS EVERY 24 HOURS
Status: DISCONTINUED | OUTPATIENT
Start: 2018-06-13 | End: 2018-06-16 | Stop reason: HOSPADM

## 2018-06-13 RX ORDER — NITROGLYCERIN 0.4 MG/1
0.4 TABLET SUBLINGUAL
Status: DISCONTINUED | OUTPATIENT
Start: 2018-06-13 | End: 2018-06-16 | Stop reason: HOSPADM

## 2018-06-13 RX ORDER — VANCOMYCIN 2 GRAM/500 ML IN 0.9 % SODIUM CHLORIDE INTRAVENOUS
2000 ONCE
Status: COMPLETED | OUTPATIENT
Start: 2018-06-13 | End: 2018-06-14

## 2018-06-13 RX ORDER — SODIUM CHLORIDE 0.9 % (FLUSH) 0.9 %
5-10 SYRINGE (ML) INJECTION AS NEEDED
Status: DISCONTINUED | OUTPATIENT
Start: 2018-06-13 | End: 2018-06-16

## 2018-06-13 RX ORDER — LISINOPRIL 5 MG/1
10 TABLET ORAL DAILY
Status: DISCONTINUED | OUTPATIENT
Start: 2018-06-14 | End: 2018-06-16 | Stop reason: HOSPADM

## 2018-06-13 RX ORDER — ALBUTEROL SULFATE 0.83 MG/ML
2.5 SOLUTION RESPIRATORY (INHALATION)
Status: DISCONTINUED | OUTPATIENT
Start: 2018-06-13 | End: 2018-06-16 | Stop reason: HOSPADM

## 2018-06-13 RX ORDER — SODIUM CHLORIDE 0.9 % (FLUSH) 0.9 %
5-10 SYRINGE (ML) INJECTION EVERY 8 HOURS
Status: DISCONTINUED | OUTPATIENT
Start: 2018-06-13 | End: 2018-06-16

## 2018-06-13 RX ORDER — CARVEDILOL 6.25 MG/1
6.25 TABLET ORAL 2 TIMES DAILY WITH MEALS
Status: DISCONTINUED | OUTPATIENT
Start: 2018-06-14 | End: 2018-06-16 | Stop reason: HOSPADM

## 2018-06-13 RX ORDER — SODIUM CHLORIDE 0.9 % (FLUSH) 0.9 %
10 SYRINGE (ML) INJECTION
Status: COMPLETED | OUTPATIENT
Start: 2018-06-13 | End: 2018-06-13

## 2018-06-13 RX ORDER — CLOPIDOGREL BISULFATE 75 MG/1
75 TABLET ORAL DAILY
Status: DISCONTINUED | OUTPATIENT
Start: 2018-06-14 | End: 2018-06-16 | Stop reason: HOSPADM

## 2018-06-13 RX ORDER — ASPIRIN 81 MG/1
81 TABLET ORAL DAILY
Status: DISCONTINUED | OUTPATIENT
Start: 2018-06-14 | End: 2018-06-16 | Stop reason: HOSPADM

## 2018-06-13 RX ORDER — GUAIFENESIN/DEXTROMETHORPHAN 100-10MG/5
10 SYRUP ORAL
Status: DISCONTINUED | OUTPATIENT
Start: 2018-06-13 | End: 2018-06-16 | Stop reason: HOSPADM

## 2018-06-13 RX ORDER — LATANOPROST 50 UG/ML
1 SOLUTION/ DROPS OPHTHALMIC
Status: DISCONTINUED | OUTPATIENT
Start: 2018-06-13 | End: 2018-06-16 | Stop reason: HOSPADM

## 2018-06-13 RX ORDER — SPIRONOLACTONE 25 MG/1
25 TABLET ORAL DAILY
Status: DISCONTINUED | OUTPATIENT
Start: 2018-06-14 | End: 2018-06-16 | Stop reason: HOSPADM

## 2018-06-13 RX ORDER — SODIUM CHLORIDE 0.9 % (FLUSH) 0.9 %
5-10 SYRINGE (ML) INJECTION EVERY 8 HOURS
Status: DISCONTINUED | OUTPATIENT
Start: 2018-06-13 | End: 2018-06-16 | Stop reason: HOSPADM

## 2018-06-13 RX ORDER — FLUTICASONE PROPIONATE 50 MCG
2 SPRAY, SUSPENSION (ML) NASAL DAILY
Status: DISCONTINUED | OUTPATIENT
Start: 2018-06-14 | End: 2018-06-16 | Stop reason: HOSPADM

## 2018-06-13 RX ORDER — BISACODYL 5 MG
5 TABLET, DELAYED RELEASE (ENTERIC COATED) ORAL DAILY PRN
Status: DISCONTINUED | OUTPATIENT
Start: 2018-06-13 | End: 2018-06-16 | Stop reason: HOSPADM

## 2018-06-13 RX ORDER — HYDROCODONE BITARTRATE AND HOMATROPINE METHYLBROMIDE 1.5; 5 MG/5ML; MG/5ML
5 SYRUP ORAL
Status: DISCONTINUED | OUTPATIENT
Start: 2018-06-13 | End: 2018-06-16 | Stop reason: HOSPADM

## 2018-06-13 RX ORDER — CIPROFLOXACIN HYDROCHLORIDE 3.5 MG/ML
1 SOLUTION/ DROPS TOPICAL
Status: DISCONTINUED | OUTPATIENT
Start: 2018-06-14 | End: 2018-06-16 | Stop reason: HOSPADM

## 2018-06-13 RX ORDER — LORATADINE 10 MG/1
10 TABLET ORAL DAILY
Status: DISCONTINUED | OUTPATIENT
Start: 2018-06-14 | End: 2018-06-16 | Stop reason: HOSPADM

## 2018-06-13 RX ORDER — ROSUVASTATIN CALCIUM 20 MG/1
40 TABLET, COATED ORAL
Status: DISCONTINUED | OUTPATIENT
Start: 2018-06-13 | End: 2018-06-16 | Stop reason: HOSPADM

## 2018-06-13 RX ORDER — MELATONIN
1000
Status: DISCONTINUED | OUTPATIENT
Start: 2018-06-13 | End: 2018-06-16 | Stop reason: HOSPADM

## 2018-06-13 RX ORDER — MORPHINE SULFATE 2 MG/ML
2 INJECTION, SOLUTION INTRAMUSCULAR; INTRAVENOUS
Status: DISCONTINUED | OUTPATIENT
Start: 2018-06-13 | End: 2018-06-16 | Stop reason: HOSPADM

## 2018-06-13 RX ORDER — ONDANSETRON 2 MG/ML
4 INJECTION INTRAMUSCULAR; INTRAVENOUS
Status: DISCONTINUED | OUTPATIENT
Start: 2018-06-13 | End: 2018-06-16 | Stop reason: HOSPADM

## 2018-06-13 RX ADMIN — Medication 10 ML: at 20:06

## 2018-06-13 RX ADMIN — IOPAMIDOL 100 ML: 755 INJECTION, SOLUTION INTRAVENOUS at 20:05

## 2018-06-13 RX ADMIN — VANCOMYCIN HYDROCHLORIDE 2000 MG: 10 INJECTION, POWDER, LYOPHILIZED, FOR SOLUTION INTRAVENOUS at 21:00

## 2018-06-13 RX ADMIN — Medication 10 ML: at 23:17

## 2018-06-13 RX ADMIN — SODIUM CHLORIDE 100 ML: 900 INJECTION, SOLUTION INTRAVENOUS at 20:06

## 2018-06-13 RX ADMIN — TOBRAMYCIN 400 MG: 40 INJECTION, SOLUTION INTRAMUSCULAR; INTRAVENOUS at 22:46

## 2018-06-13 RX ADMIN — PIPERACILLIN SODIUM,TAZOBACTAM SODIUM 4.5 G: 4; .5 INJECTION, POWDER, FOR SOLUTION INTRAVENOUS at 21:00

## 2018-06-13 RX ADMIN — SODIUM CHLORIDE 1000 ML: 900 INJECTION, SOLUTION INTRAVENOUS at 19:30

## 2018-06-13 NOTE — IP AVS SNAPSHOT
[facility] 
 
 
 [address] [facility] [address] 
[phone] Patient: Laine Orellana MRN: [id number] RQM:[id number] About your hospitalization You were admitted on:  June 13, 2018 You last received care in the:  [facility] 3 CLINICAL OBSERVATION You were discharged on:  June 16, 2018 Why you were hospitalized Your primary diagnosis was:  Hcap (Healthcare-Associated Pneumonia) Your diagnoses also included:  Chest Pain, Hypertension, Dyslipidemia, Ischemic Cardiomyopathy, Coronary Artery Disease Involving Native Coronary Artery Of Native Heart, Bilateral Conjunctivitis, Mediastinal Adenopathy Follow-up Information Follow up With Details Comments Contact Info Renetta Vences MD Schedule an appointment as soon as possible for a visit in 5 days  [address] [facility] [address] 
[phone] Yogesh Castillo MD Schedule an appointment as soon as possible for a visit in 9 days  [address] [facility] [address] 
[phone] Kalia Rdz MD Schedule an appointment as soon as possible for a visit in 11 weeks  [facility] [address] [facility] [address] 
[phone] Your Scheduled Appointments Thursday July 26, 2018  3:30 PM EDT Office Visit with Yogesh Castillo MD  
[facility] CARDIOLOGY [place] OFFICE ([address] [place] [address]) [address] [place] [address]  
[phone] Discharge Orders None A check rolando indicates which time of day the medication should be taken. My Medications START taking these medications Instructions Each Dose to Equal  
 Morning Noon Evening Bedtime  
 ciprofloxacin HCl 0.3 % ophthalmic solution Commonly known as:  Lillian Oliveira Administer 1 Drop to both eyes four (4) times daily. 1 Drop  
    
  
   
  
   
  
   
  
  
 levoFLOXacin 750 mg tablet Commonly known as:  Bobby Gore  
   
 Take 1 Tab by mouth daily. 750 mg CONTINUE taking these medications Instructions Each Dose to Equal  
 Morning Noon Evening Bedtime  
 aspirin delayed-release 81 mg tablet Take  by mouth nightly. b complex vitamins tablet Take 1 Tab by mouth nightly. 1 Tab  
    
   
   
   
  
  
 carvedilol 6.25 mg tablet Commonly known as:  Collinmaida Cyrrow Take 1 Tab by mouth two (2) times daily (with meals). 6.25 mg  
    
  
   
   
  
   
  
 cholecalciferol 1,000 unit tablet Commonly known as:  VITAMIN D3 Take 1,000 Units by mouth nightly. 1000 Units  
    
   
   
   
  
  
 clopidogrel 75 mg Tab Commonly known as:  PLAVIX Take 1 Tab by mouth daily. 75 mg  
    
  
   
   
   
  
 fexofenadine 180 mg tablet Commonly known as:  Martinez Gloria Take 180 mg by mouth daily. Per anesthesia protocol:instructed to take am of surgery. Indications: ALLERGIC RHINITIS  
 180 mg FISH OIL CONCENTRATE PO Take  by mouth. fluticasone 50 mcg/actuation nasal spray Commonly known as:  FLONASE  
   
 USE 2 SPRAYS IN BOTH NOSTRILS DAILY. HYDROcodone-homatropine 5-1.5 mg/5 mL (5 mL) syrup Commonly known as:  HYCODAN Take 5 mL by mouth four (4) times daily as needed. Max Daily Amount: 20 mL. 5 mL  
    
   
   
   
  
 latanoprost 0.005 % ophthalmic solution Commonly known as:  Daija Gonzlaez Administer 1 Drop to both eyes nightly. 1 Drop  
    
   
   
   
  
  
 lisinopril 10 mg tablet Commonly known as:  Rich Aggie Take 1 Tab by mouth daily. 10 mg  
    
  
   
   
   
  
 MULTIVITAMIN 50 PLUS PO Take 1 Tab by mouth daily. 1 Tab  
    
  
   
   
   
  
 nitroglycerin 0.4 mg SL tablet Commonly known as:  NITROSTAT  
   
 1 Tab by SubLINGual route every five (5) minutes as needed for Chest Pain. Up to 3 doses. 0.4 mg  
    
   
   
   
  
 pantoprazole 40 mg tablet Commonly known as:  PROTONIX Take 1 Tab by mouth Daily (before breakfast). 40 mg  
    
  
   
   
   
  
 rosuvastatin 40 mg tablet Commonly known as:  CRESTOR Take 1 Tab by mouth nightly. 40 mg  
    
   
   
   
  
  
 spironolactone 25 mg tablet Commonly known as:  ALDACTONE Take 1 Tab by mouth daily. 25 mg  
    
  
   
   
   
  
 ticagrelor 90 mg tablet Commonly known as:  [employer] Take 90 mg by mouth two (2) times a day. 90 mg  
    
  
   
   
  
   
  
 VISION-LAMONT + ZINC PO Take 1 Tab by mouth daily. 1 Tab Where to Get Your Medications These medications were sent to [address] [facility], [address] [place] [address]. [address] Phone:  [phone]  
  ciprofloxacin HCl 0.3 % ophthalmic solution  
 levoFLOXacin 750 mg tablet Opioid Education Prescription Opioids: What You Need to Know: 
 
Prescription opioids can be used to help relieve moderate-to-severe pain and are often prescribed following a surgery or injury, or for certain health conditions. These medications can be an important part of treatment but also come with serious risks. Opioids are strong pain medicines. Examples include hydrocodone, oxycodone, fentanyl, and morphine. Heroin is an example of an illegal opioid. It is important to work with your health care provider to make sure you are getting the safest, most effective care. WHAT ARE THE RISKS AND SIDE EFFECTS OF OPIOID USE? Prescription opioids carry serious risks of addiction and overdose, especially with prolonged use. An opioid overdose, often marked by slow breathing, can cause sudden death. The use of prescription opioids can have a number of side effects as well, even when taken as directed. · Tolerance-meaning you might need to take more of a medication for the same pain relief · Physical dependence-meaning you have symptoms of withdrawal when the medication is stopped. Withdrawal symptoms can include nausea, sweating, chills, diarrhea, stomach cramps, and muscle aches. Withdrawal can last up to several weeks, depending on which drug you took and how long you took it. · Increased sensitivity to pain · Constipation · Nausea, vomiting, and dry mouth · Sleepiness and dizziness · Confusion · Depression · Low levels of testosterone that can result in lower sex drive, energy, and strength · Itching and sweating RISKS ARE GREATER WITH:      
· History of drug misuse, substance use disorder, or overdose · Mental health conditions (such as depression or anxiety) · Sleep apnea · Older age (72 years or older) · Pregnancy Avoid alcohol while taking prescription opioids. Also, unless specifically advised by your health care provider, medications to avoid include: · Benzodiazepines (such as Xanax or Valium) · Muscle relaxants (such as Soma or Flexeril) · Hypnotics (such as Ambien or Lunesta) · Other prescription opioids KNOW YOUR OPTIONS Talk to your health care provider about ways to manage your pain that don't involve prescription opioids. Some of these options may actually work better and have fewer risks and side effects. Options may include: 
· Pain relievers such as acetaminophen, ibuprofen, and naproxen · Some medications that are also used for depression or seizures · Physical therapy and exercise · Counseling to help patients learn how to cope better with triggers of pain and stress. · Application of heat or cold compress · Massage therapy · Relaxation techniques Be Informed Make sure you know the name of your medication, how much and how often to take it, and its potential risks & side effects.  
 
IF YOU ARE PRESCRIBED OPIOIDS FOR PAIN: 
 · Never take opioids in greater amounts or more often than prescribed. Remember the goal is not to be pain-free but to manage your pain at a tolerable level. · Follow up with your primary care provider to: · Work together to create a plan on how to manage your pain. · Talk about ways to help manage your pain that don't involve prescription opioids. · Talk about any and all concerns and side effects. · Help prevent misuse and abuse. · Never sell or share prescription opioids · Help prevent misuse and abuse. · Store prescription opioids in a secure place and out of reach of others (this may include visitors, children, friends, and family). · Safely dispose of unused/unwanted prescription opioids: Find your community drug take-back program or your pharmacy mail-back program, or flush them down the toilet, following guidance from the Food and Drug Administration (www.fda.gov/Drugs/ResourcesForYou). · Visit www.cdc.gov/drugoverdose to learn about the risks of opioid abuse and overdose. · If you believe you may be struggling with addiction, tell your health care provider and ask for guidance or call [facility] [employer] at [phone]. Discharge Instructions Shortness of Breath: Care Instructions Your Care Instructions Shortness of breath has many causes. Sometimes conditions such as anxiety can lead to shortness of breath. Some people get mild shortness of breath when they exercise. Trouble breathing also can be a symptom of a serious problem, such as asthma, lung disease, emphysema, heart problems, and pneumonia. If your shortness of breath continues, you may need tests and treatment. Watch for any changes in your breathing and other symptoms. Follow-up care is a key part of your treatment and safety.  Be sure to make and go to all appointments, and call your doctor if you are having problems. It's also a good idea to know your test results and keep a list of the medicines you take. How can you care for yourself at home? · Do not smoke or allow others to smoke around you. If you need help quitting, talk to your doctor about stop-smoking programs and medicines. These can increase your chances of quitting for good. · Get plenty of rest and sleep. · Take your medicines exactly as prescribed. Call your doctor if you think you are having a problem with your medicine. · Find healthy ways to deal with stress. ¨ Exercise daily. ¨ Get plenty of sleep. ¨ Eat regularly and well. When should you call for help? Call 911 anytime you think you may need emergency care. For example, call if: 
? · You have severe shortness of breath. ? · You have symptoms of a heart attack. These may include: ¨ Chest pain or pressure, or a strange feeling in the chest. 
¨ Sweating. ¨ Shortness of breath. ¨ Nausea or vomiting. ¨ Pain, pressure, or a strange feeling in the back, neck, jaw, or upper belly or in one or both shoulders or arms. ¨ Lightheadedness or sudden weakness. ¨ A fast or irregular heartbeat. After you call 911, the  may tell you to chew 1 adult-strength or 2 to 4 low-dose aspirin. Wait for an ambulance. Do not try to drive yourself. ?Call your doctor now or seek immediate medical care if: 
? · Your shortness of breath gets worse or you start to wheeze. Wheezing is a high-pitched sound when you breathe. ? · You wake up at night out of breath or have to prop your head up on several pillows to breathe. ? · You are short of breath after only light activity or while at rest. ? Watch closely for changes in your health, and be sure to contact your doctor if: 
? · You do not get better over the next 1 to 2 days. Where can you learn more? Go to http://arsen-cam.info/. Enter S780 in the search box to learn more about \"Shortness of Breath: Care Instructions. \" 
 Current as of: May 12, 2017 Content Version: 11.4 © 8001-4186 Comeet. Care instructions adapted under license by [employer] (which disclaims liability or warranty for this information). If you have questions about a medical condition or this instruction, always ask your healthcare professional. [address] any warranty or liability for your use of this information. Pinkeye: Care Instructions Your Care Instructions Pinkeye is redness and swelling of the eye surface and the conjunctiva (the lining of the eyelid and the covering of the white part of the eye). Pinkeye is also called conjunctivitis. Pinkeye is often caused by infection with bacteria or a virus. Dry air, allergies, smoke, and chemicals are other common causes. Pinkeye often clears on its own in 7 to 10 days. Antibiotics only help if the pinkeye is caused by bacteria. Pinkeye caused by infection spreads easily. If an allergy or chemical is causing pinkeye, it will not go away unless you can avoid whatever is causing it. Follow-up care is a key part of your treatment and safety. Be sure to make and go to all appointments, and call your doctor if you are having problems. It's also a good idea to know your test results and keep a list of the medicines you take. How can you care for yourself at home? · Wash your hands often. Always wash them before and after you treat pinkeye or touch your eyes or face. · Use moist cotton or a clean, wet cloth to remove crust. Wipe from the inside corner of the eye to the outside. Use a clean part of the cloth for each wipe. · Put cold or warm wet cloths on your eye a few times a day if the eye hurts. · Do not wear contact lenses or eye makeup until the pinkeye is gone. Throw away any eye makeup you were using when you got pinkeye. Clean your contacts and storage case.  If you wear disposable contacts, use a new pair when your eye has cleared and it is safe to wear contacts again. · If the doctor gave you antibiotic ointment or eyedrops, use them as directed. Use the medicine for as long as instructed, even if your eye starts looking better soon. Keep the bottle tip clean, and do not let it touch the eye area. · To put in eyedrops or ointment: ¨ Tilt your head back, and pull your lower eyelid down with one finger. ¨ Drop or squirt the medicine inside the lower lid. ¨ Close your eye for 30 to 60 seconds to let the drops or ointment move around. ¨ Do not touch the ointment or dropper tip to your eyelashes or any other surface. · Do not share towels, pillows, or washcloths while you have pinkeye. When should you call for help? Call your doctor now or seek immediate medical care if: 
? · You have pain in your eye, not just irritation on the surface. ? · You have a change in vision or loss of vision. ? · You have an increase in discharge from the eye.  
? · Your eye has not started to improve or begins to get worse within 48 hours after you start using antibiotics. ? · Pinkeye lasts longer than 7 days. ? Watch closely for changes in your health, and be sure to contact your doctor if you have any problems. Where can you learn more? Go to http://arsen-cam.info/. Enter Y392 in the search box to learn more about \"Pinkeye: Care Instructions. \" Current as of: March 20, 2017 Content Version: 11.4 © 8375-8226 PlaceVine. Care instructions adapted under license by [employer] (which disclaims liability or warranty for this information). If you have questions about a medical condition or this instruction, always ask your healthcare professional. [place] [address] any warranty or liability for your use of this information. DISCHARGE SUMMARY from Nurse PATIENT INSTRUCTIONS: 
 
 
Recognize signs and symptoms of STROKE: 
 
 F-face looks uneven A-arms unable to move or move unevenly S-speech slurred or non-existent T-time-call 911 as soon as signs and symptoms begin-DO NOT go Back to bed or wait to see if you get better-TIME IS BRAIN. Warning Signs of HEART ATTACK Call 911 if you have these symptoms: 
? Chest discomfort. Most heart attacks involve discomfort in the center of the chest that lasts more than a few minutes, or that goes away and comes back. It can feel like uncomfortable pressure, squeezing, fullness, or pain. ? Discomfort in other areas of the upper body. Symptoms can include pain or discomfort in one or both arms, the back, neck, jaw, or stomach. ? Shortness of breath with or without chest discomfort. ? Other signs may include breaking out in a cold sweat, nausea, or lightheadedness. Don't wait more than five minutes to call [address]! Fast action can save your life. Calling 911 is almost always the fastest way to get lifesaving treatment. Emergency Medical Services staff can begin treatment when they arrive  up to an hour sooner than if someone gets to the hospital by car. The discharge information has been reviewed with the {PATIENT PARENT GUARDIAN:47909}. The {PATIENT PARENT GUARDIAN:41650} verbalized understanding. Discharge medications reviewed with the {Dishcarge meds reviewed ODGZ:31464} and appropriate educational materials and side effects teaching were provided. ___________________________________________________________________________________________________________________________________ Introducing [facility]! [facility] introduces [employer] patient portal. Now you can access parts of your medical record, email your doctor's office, and request medication refills online. 1. In your internet browser, go to [email or web address] 2. Click on the First Time User? Click Here link in the Sign In box.  You will see the New Member Sign Up page. 3. Enter your WebAction Access Code exactly as it appears below. You will not need to use this code after youve completed the sign-up process. If you do not sign up before the expiration date, you must request a new code. · WebAction Access Code: [email or web address] Expires: 8/7/2018 11:28 AM 
 
4. Enter the last four digits of your Social Security Number (xxxx) and Date of Birth (mm/dd/yyyy) as indicated and click Submit. You will be taken to the next sign-up page. 5. Create a Glokaliset ID. This will be your WebAction login ID and cannot be changed, so think of one that is secure and easy to remember. 6. Create a Glokaliset password. You can change your password at any time. 7. Enter your Password Reset Question and Answer. This can be used at a later time if you forget your password. 8. Enter your e-mail address. You will receive e-mail notification when new information is available in [facility] [address] 9. Click Sign Up. You can now view and download portions of your medical record. 10. Click the Download Summary menu link to download a portable copy of your medical information. If you have questions, please visit the Frequently Asked Questions section of the WebAction website. Remember, WebAction is NOT to be used for urgent needs. For medical emergencies, dial 911. Now available from your iPhone and Android! Introducing Wilfred Alberto As a Hiroharleen Natasha patient, I wanted to make you aware of our electronic visit tool called Wilfred Jamilasyanatalia. Magdaleno Kaur 24/7 allows you to connect within minutes with a medical provider 24 hours a day, seven days a week via a mobile device or tablet or logging into a secure website from your computer. You can access Wilfred Alberto from anywhere in the [place].  
 
A virtual visit might be right for you when you have a simple condition and feel like you just dont want to get out of bed, or cant get away from work for an appointment, when your regular Nighat Reusing provider is not available (evenings, weekends or holidays), or when youre out of town and need minor care. Electronic visits cost only $49 and if the Nighat Reusing 24/7 provider determines a prescription is needed to treat your condition, one can be electronically transmitted to a nearby pharmacy*. Please take a moment to enroll today if you have not already done so. The enrollment process is free and takes just a few minutes. To enroll, please download the Nighat Reusing 24/7 ariane to your tablet or phone, or visit [email or web address] to enroll on your computer. And, as an [address] [place] [address] patient with a [employer] account, the results of your visits will be scanned into your electronic medical record and your primary care provider will be able to view the scanned results. We urge you to continue to see your regular Nighat Reusing provider for your ongoing medical care. And while your primary care provider may not be the one available when you seek a Wilfred Zeviaasyafin virtual visit, the peace of mind you get from getting a real diagnosis real time can be priceless. For more information on Silent Edge, view our Frequently Asked Questions (FAQs) at [email or web address]. Sincerely, 
 
Leona Rinaldi MD 
Chief Medical Officer [facility] Bren Regalado *:  certain medications cannot be prescribed via R&VasyaRigel Unresulted tests-please follow up with your PCP on these results Procedure/Test Authorizing Provider  ANCA PANEL Yanci West MD  
 C REACTIVE PROTEIN, QT Mervin Powers MD  
 CBC W/O DIFF Romana Croak, MD  
 CBC W/O DIFF Romana Croak, MD  
 CBC WITH AUTOMATED DIFF Fito Carpio MD  
 CBC WITH AUTOMATED DIFF Mervin Powers MD  
 CK Mervin Powers MD  
 CT CHEST Venetta Hashimoto, MD  
 CULTURE, BLOOD MD JOSE Hernández, BLOOD Juancarlos iHckman MD  
 Jessica Ghosh MD  
 EKG, 12 LEAD, MD Hood Gomez MD Quentin Boss, MD  
 METABOLIC PANEL, Wilson Buckley MD  
 METABOLIC PANEL, Jose Dinero MD  
 METABOLIC PANEL, Jose Dinero MD  
 METABOLIC PANEL, Joslyn Leyden, MD Gregor Call, MD Towana Rideau, MD  
 TOBRAMYCIN, MD Sury Donovan, MD Carmencita Hoyer, MD Carmencita Hoyer, MD Ethel Spatz, MD  
 XR CHEST PA LAT Fito Villar MD  
  
Providers Seen During Your Hospitalization Provider Specialty Primary office phone Oc Dorantes MD Emergency Medicine [phone] Maral Agosto MD Internal Medicine [phone] Your Primary Care Physician (PCP) Primary Care Physician Office Phone Office Fax [address] [phone] You are allergic to the following Allergen Reactions Meloxicam Itching Recent Documentation Height Weight BMI Smoking Status 1.829 m 102.1 kg 30.53 kg/m2 Former Smoker Emergency Contacts Name Discharge Info Relation Home Work Mobile [facility] DISCHARGE CAREGIVER [3] Spouse [3] [phone] Patient Belongings The following personal items are in your possession at time of discharge: 
  Dental Appliances: Uppers, Lowers, With patient (dentures)  Visual Aid: Glasses, With patient      Home Medications: Sent home   Jewelry: Ring  Clothing: Footwear, Undergarments, Pants, Shirt    Other Valuables: Eyeglasses, Wig Discharge Instructions Attachments/References PNEUMONIA (ENGLISH) Patient Handouts Pneumonia: Care Instructions Your Care Instructions Pneumonia is an infection of the lungs. Most cases are caused by infections from bacteria or viruses. Pneumonia may be mild or very severe. If it is caused by bacteria, you will be treated with antibiotics. It may take a few weeks to a few months to recover fully from pneumonia, depending on how sick you were and whether your overall health is good. Follow-up care is a key part of your treatment and safety. Be sure to make and go to all appointments, and call your doctor if you are having problems. It's also a good idea to know your test results and keep a list of the medicines you take. How can you care for yourself at home? · Take your antibiotics exactly as directed. Do not stop taking the medicine just because you are feeling better. You need to take the full course of antibiotics. · Take your medicines exactly as prescribed. Call your doctor if you think you are having a problem with your medicine. · Get plenty of rest and sleep. You may feel weak and tired for a while, but your energy level will improve with time. · To prevent dehydration, drink plenty of fluids, enough so that your urine is light yellow or clear like water. Choose water and other caffeine-free clear liquids until you feel better. If you have kidney, heart, or liver disease and have to limit fluids, talk with your doctor before you increase the amount of fluids you drink. · Take care of your cough so you can rest. A cough that brings up mucus from your lungs is common with pneumonia. It is one way your body gets rid of the infection. But if coughing keeps you from resting or causes severe fatigue and chest-wall pain, talk to your doctor. He or she may suggest that you take a medicine to reduce the cough. · Use a vaporizer or humidifier to add moisture to your bedroom. Follow the directions for cleaning the machine. · Do not smoke or allow others to smoke around you.  Smoke will make your cough last longer. If you need help quitting, talk to your doctor about stop-smoking programs and medicines. These can increase your chances of quitting for good. · Take an over-the-counter pain medicine, such as acetaminophen (Tylenol), ibuprofen (Advil, Motrin), or naproxen (Aleve). Read and follow all instructions on the label. · Do not take two or more pain medicines at the same time unless the doctor told you to. Many pain medicines have acetaminophen, which is Tylenol. Too much acetaminophen (Tylenol) can be harmful. · If you were given a spirometer to measure how well your lungs are working, use it as instructed. This can help your doctor tell how your recovery is going. · To prevent pneumonia in the future, talk to your doctor about getting a flu vaccine (once a year) and a pneumococcal vaccine (one time only for most people). When should you call for help? Call 911 anytime you think you may need emergency care. For example, call if: 
? · You have severe trouble breathing. ?Call your doctor now or seek immediate medical care if: 
? · You cough up dark brown or bloody mucus (sputum). ? · You have new or worse trouble breathing. ? · You are dizzy or lightheaded, or you feel like you may faint. ? Watch closely for changes in your health, and be sure to contact your doctor if: 
? · You have a new or higher fever. ? · You are coughing more deeply or more often. ? · You are not getting better after 2 days (48 hours). ? · You do not get better as expected. Where can you learn more? Go to http://arsen-cam.info/. Enter [id number] in the search box to learn more about \"Pneumonia: Care Instructions. \" Current as of: May 12, 2017 Content Version: 11.4 © 5280-3731 Healthwise, [employer]. Care instructions adapted under license by [employer] (which disclaims liability or warranty for this information).  If you have questions about a medical condition or this instruction, always ask your healthcare professional. [address] any warranty or liability for your use of this information. Please provide this summary of care documentation to your next provider. Signatures-by signing, you are acknowledging that this After Visit Summary has been reviewed with you and you have received a copy. Patient Signature:  ____________________________________________________________ Date:  ____________________________________________________________  
  
Manny Ala Provider Signature:  ____________________________________________________________ Date:  ____________________________________________________________

## 2018-06-13 NOTE — IP AVS SNAPSHOT
Summary of Care Report The Summary of Care report has been created to help improve care coordination. Users with access to [employer]Jane or [address] (Web-based application) may access additional patient information including the Discharge Summary. If you are not currently a [address] user and need more information, please call the number listed below in the [place] [address] section and ask to be connected with Medical Records. Facility Information Name Address Phone [address] [address] [address] [phone] Patient Information Patient Name Sex ALONSO Haider ([id number]) Male 1935 Discharge Information Admitting Provider Service Area Unit Ricardo Tolliver MD / [id number] Clinical Obs / [phone] Discharge Provider Discharge Date/Time Discharge Disposition Destination (none) 2018 (Pending) AHR (none) Patient Language Language ENGLISH [13] Hospital Problems as of 2018  Reviewed: 6/15/2018  9:42 AM by Yogesh Dyer NP Class Noted - Resolved Last Modified POA Active Problems Hypertension (Chronic)  Unknown - Present 6/15/2018 by Yogesh Dyer NP Yes Entered by Fara Kong Overview Signed 10/3/2015 10:44 AM by Fara Kong  
   controlled w/med Dyslipidemia (Chronic)  2018 - Present 6/15/2018 by Yogesh Dyer NP Yes Entered by Charmayne Pouch, NP Ischemic cardiomyopathy (Chronic)  2018 - Present 6/15/2018 by Yogesh Dyer NP Yes Entered by Keli Jalloh MD  
  Overview Signed 2018 12:06 PM by Keli Jalloh MD  
   1. LV gram:  EF 40-45% with apical hypokinesis. 2.  Echo (18):  EF 50-55%. No signnificant valve disease. Coronary artery disease involving native coronary artery of native heart (Chronic)  6/13/2018 - Present 6/15/2018 by Kale Ayers NP Yes Entered by Bruno Lawler MD  
  Overview Addendum 6/13/2018 12:04 PM by Bruno Lawler MD  
   1. NSTEMI (6/4/18): Peak troponin 11. EF 40-45%. Multi-vessel PCI. mLAD:  Synergy 3.5 x16 mm AKASH. mCirc:  Synergy 3 x 20 mm AKASH. mRCA:  Resolute Mathis 5 x 22 mm AKASH. Chest pain  6/13/2018 - Present 6/13/2018 by Lourdes Still MD Yes Entered by Lourdes Still MD  
  * (Principal)HCAP (healthcare-associated pneumonia)  6/13/2018 - Present 6/13/2018 by Lourdes Still MD Yes Entered by Lourdes Still MD  
  Bilateral conjunctivitis  6/13/2018 - Present 6/13/2018 by Lourdes Still MD Yes Entered by Lourdes Still MD  
  Mediastinal adenopathy  6/14/2018 - Present 6/14/2018 by Veto Little MD Unknown Entered by Veto Little MD  
  
Non-Hospital Problems as of 6/16/2018  Reviewed: 6/15/2018  9:42 AM by Kale Ayers NP Class Noted - Resolved Last Modified Active Problems S/P total knee arthroplasty  4/15/2015 - Present 4/15/2015 by Amil Meckel., MD  
  Entered by Amil Meckel., MD  
  H/O thromboembolism  4/15/2015 - Present 4/15/2015 by Wilfrid Oconnor NP Entered by Wilfrid Oconnor NP Snoring  6/11/2015 - Present 6/11/2015 by Shawna Chiang NP Entered by Shawna Chiang NP Arthritis  Unknown - Present 10/3/2015 by Sonia Washburn Entered by Sonia Washburn Overview Signed 10/3/2015 10:44 AM by Sonia Washburn  
   osteo Prediabetes  10/8/2015 - Present 10/8/2015 by Everardo Tillman MD  
  Entered by Everardo Tillman MD  
  Status post total right knee replacement  1/11/2016 - Present 1/11/2016 by JESSEI Murray Entered by JESSIE Murray   Encounter for long-term (current) use of medications  4/12/2016 - Present 4/12/2016 by Constance Fregoso Entered by Constance Fregoso Allergic rhinitis  10/20/2016 - Present 10/20/2016 by Gladys Dominguez MD  
  Entered by Gladys Dominguez MD  
  Sebaceous cyst  4/20/2017 - Present 4/20/2017 by Gladys Dominguez MD  
  Entered by Gladys Dominguez MD  
  Lumbar stenosis with neurogenic claudication  12/14/2017 - Present 12/14/2017 by Hemant Dong MD  
  Entered by Hemant Dong MD  
  Macular degeneration  5/9/2018 - Present 5/9/2018 by Gladys Dominguez MD  
  Entered by Gladys Dominguez MD  
  NSTEMI (non-ST elevated myocardial infarction) [facility])  6/1/2018 - Present 6/1/2018 by Nuvia Land MD  
  Entered by Madina Rene NP You are allergic to the following Allergen Reactions Meloxicam Itching Current Discharge Medication List  
  
START taking these medications Dose & Instructions Dispensing Information Comments  
 ciprofloxacin HCl 0.3 % ophthalmic solution Commonly known as:  [employer] [place] Dose:  1 Drop Administer 1 Drop to both eyes four (4) times daily. Quantity:  5 mL Refills:  0  
   
 levoFLOXacin 750 mg tablet Commonly known as:  [employer] Beards Dose:  750 mg Take 1 Tab by mouth daily. Quantity:  7 Tab Refills:  0 CONTINUE these medications which have NOT CHANGED Dose & Instructions Dispensing Information Comments  
 aspirin delayed-release 81 mg tablet Take  by mouth nightly. Refills:  0  
   
 b complex vitamins tablet Dose:  1 Tab Take 1 Tab by mouth nightly. Refills:  0  
   
 carvedilol 6.25 mg tablet Commonly known as:  [employer] Dose:  6.25 mg Take 1 Tab by mouth two (2) times daily (with meals). Quantity:  60 Tab Refills:  11  
   
 cholecalciferol 1,000 unit tablet Commonly known as:  VITAMIN D3 Dose:  1000 Units Take 1,000 Units by mouth nightly. Refills:  0  
   
 clopidogrel 75 mg Tab Commonly known as:  PLAVIX  Dose:  75 mg  
 Take 1 Tab by mouth daily. Quantity:  90 Tab Refills:  3  
   
 fexofenadine 180 mg tablet Commonly known as:  Elinore Mehnaz Dose:  180 mg Take 180 mg by mouth daily. Per anesthesia protocol:instructed to take am of surgery. Indications: ALLERGIC RHINITIS Refills:  0  
   
 FISH OIL CONCENTRATE PO Take  by mouth. Refills:  0  
   
 fluticasone 50 mcg/actuation nasal spray Commonly known as:  FLONASE  
 USE 2 SPRAYS IN BOTH NOSTRILS DAILY. Quantity:  48 g Refills:  3 HYDROcodone-homatropine 5-1.5 mg/5 mL (5 mL) syrup Commonly known as:  HYCODAN Dose:  5 mL Take 5 mL by mouth four (4) times daily as needed. Max Daily Amount: 20 mL. Quantity:  120 mL Refills:  0  
   
 latanoprost 0.005 % ophthalmic solution Commonly known as:  Sherley Demetrio Dose:  1 Drop Administer 1 Drop to both eyes nightly. Refills:  0  
   
 lisinopril 10 mg tablet Commonly known as:  Zack Santy Dose:  10 mg Take 1 Tab by mouth daily. Quantity:  30 Tab Refills:  11 MULTIVITAMIN 50 PLUS PO Dose:  1 Tab Take 1 Tab by mouth daily. Refills:  0  
   
 nitroglycerin 0.4 mg SL tablet Commonly known as:  NITROSTAT Dose:  0.4 mg  
1 Tab by SubLINGual route every five (5) minutes as needed for Chest Pain. Up to 3 doses. Quantity:  1 Bottle Refills:  5  
   
 pantoprazole 40 mg tablet Commonly known as:  PROTONIX Dose:  40 mg Take 1 Tab by mouth Daily (before breakfast). Quantity:  30 Tab Refills:  11  
   
 rosuvastatin 40 mg tablet Commonly known as:  CRESTOR Dose:  40 mg Take 1 Tab by mouth nightly. Quantity:  30 Tab Refills:  11  
   
 spironolactone 25 mg tablet Commonly known as:  ALDACTONE Dose:  25 mg Take 1 Tab by mouth daily. Quantity:  30 Tab Refills:  11  
   
 ticagrelor 90 mg tablet Commonly known as:  Melissa [employer] Dose:  90 mg Take 90 mg by mouth two (2) times a day.   
 Refills:  0  
   
 VISION-LAMONT + ZINC PO  
 Dose:  1 Tab Take 1 Tab by mouth daily. Refills:  0 Current Immunizations Name Date Influenza High Dose Vaccine PF 9/6/2017, 10/20/2016, 10/8/2015 Influenza Vaccine 9/29/2014, 9/18/2013 Pneumococcal Conjugate (PCV-13) 3/30/2015 Pneumococcal Polysaccharide (PPSV-23) 9/18/2013 TB Skin Test (PPD) Intradermal 1/6/2016, 4/15/2015 Follow-up Information Follow up With Details Comments Contact Info Juani Moreno MD Schedule an appointment as soon as possible for a visit in 5 days  [address] [facility] [address] 
[phone] Fatimah Aquino MD Schedule an appointment as soon as possible for a visit in 9 days  [facility]  [address] [facility] [place] [address] 
[phone] Crispin Mancera MD Schedule an appointment as soon as possible for a visit in 11 weeks  [address] [facility] [address] [facility] [place] [address] 
[phone] Discharge Instructions Shortness of Breath: Care Instructions Your Care Instructions Shortness of breath has many causes. Sometimes conditions such as anxiety can lead to shortness of breath. Some people get mild shortness of breath when they exercise. Trouble breathing also can be a symptom of a serious problem, such as asthma, lung disease, emphysema, heart problems, and pneumonia. If your shortness of breath continues, you may need tests and treatment. Watch for any changes in your breathing and other symptoms. Follow-up care is a key part of your treatment and safety. Be sure to make and go to all appointments, and call your doctor if you are having problems. It's also a good idea to know your test results and keep a list of the medicines you take. How can you care for yourself at home? · Do not smoke or allow others to smoke around you. If you need help quitting, talk to your doctor about stop-smoking programs and medicines. These can increase your chances of quitting for good. · Get plenty of rest and sleep. · Take your medicines exactly as prescribed. Call your doctor if you think you are having a problem with your medicine. · Find healthy ways to deal with stress. ¨ Exercise daily. ¨ Get plenty of sleep. ¨ Eat regularly and well. When should you call for help? Call 911 anytime you think you may need emergency care. For example, call if: 
? · You have severe shortness of breath. ? · You have symptoms of a heart attack. These may include: ¨ Chest pain or pressure, or a strange feeling in the chest. 
¨ Sweating. ¨ Shortness of breath. ¨ Nausea or vomiting. ¨ Pain, pressure, or a strange feeling in the back, neck, jaw, or upper belly or in one or both shoulders or arms. ¨ Lightheadedness or sudden weakness. ¨ A fast or irregular heartbeat. After you call 911, the  may tell you to chew 1 adult-strength or 2 to 4 low-dose aspirin. Wait for an ambulance. Do not try to drive yourself. ?Call your doctor now or seek immediate medical care if: 
? · Your shortness of breath gets worse or you start to wheeze. Wheezing is a high-pitched sound when you breathe. ? · You wake up at night out of breath or have to prop your head up on several pillows to breathe. ? · You are short of breath after only light activity or while at rest. ? Watch closely for changes in your health, and be sure to contact your doctor if: 
? · You do not get better over the next 1 to 2 days. Where can you learn more? Go to http://arsen-cam.info/. Enter S780 in the search box to learn more about \"Shortness of Breath: Care Instructions. \" Current as of: May 12, 2017 Content Version: 11.4 © 3113-1225 [employer]Generator. Care instructions adapted under license by [employer] (which disclaims liability or warranty for this information).  If you have questions about a medical condition or this instruction, always ask your healthcare professional. [address] any warranty or liability for your use of this information. Pinkeye: Care Instructions Your Care Instructions Pinkeye is redness and swelling of the eye surface and the conjunctiva (the lining of the eyelid and the covering of the white part of the eye). Pinkeye is also called conjunctivitis. Pinkeye is often caused by infection with bacteria or a virus. Dry air, allergies, smoke, and chemicals are other common causes. Pinkeye often clears on its own in 7 to 10 days. Antibiotics only help if the pinkeye is caused by bacteria. Pinkeye caused by infection spreads easily. If an allergy or chemical is causing pinkeye, it will not go away unless you can avoid whatever is causing it. Follow-up care is a key part of your treatment and safety. Be sure to make and go to all appointments, and call your doctor if you are having problems. It's also a good idea to know your test results and keep a list of the medicines you take. How can you care for yourself at home? · Wash your hands often. Always wash them before and after you treat pinkeye or touch your eyes or face. · Use moist cotton or a clean, wet cloth to remove crust. Wipe from the inside corner of the eye to the outside. Use a clean part of the cloth for each wipe. · Put cold or warm wet cloths on your eye a few times a day if the eye hurts. · Do not wear contact lenses or eye makeup until the pinkeye is gone. Throw away any eye makeup you were using when you got pinkeye. Clean your contacts and storage case. If you wear disposable contacts, use a new pair when your eye has cleared and it is safe to wear contacts again. · If the doctor gave you antibiotic ointment or eyedrops, use them as directed. Use the medicine for as long as instructed, even if your eye starts looking better soon. Keep the bottle tip clean, and do not let it touch the eye area. · To put in eyedrops or ointment: ¨ Tilt your head back, and pull your lower eyelid down with one finger. ¨ Drop or squirt the medicine inside the lower lid. ¨ Close your eye for 30 to 60 seconds to let the drops or ointment move around. ¨ Do not touch the ointment or dropper tip to your eyelashes or any other surface. · Do not share towels, pillows, or washcloths while you have pinkeye. When should you call for help? Call your doctor now or seek immediate medical care if: 
? · You have pain in your eye, not just irritation on the surface. ? · You have a change in vision or loss of vision. ? · You have an increase in discharge from the eye.  
? · Your eye has not started to improve or begins to get worse within 48 hours after you start using antibiotics. ? · Pinkeye lasts longer than 7 days. ? Watch closely for changes in your health, and be sure to contact your doctor if you have any problems. Where can you learn more? Go to http://arsen-cam.info/. Enter Y392 in the search box to learn more about \"Pinkeye: Care Instructions. \" Current as of: March 20, 2017 Content Version: 11.4 © 4099-8011 Healthwise, [employer]. Care instructions adapted under license by [employer] (which disclaims liability or warranty for this information). If you have questions about a medical condition or this instruction, always ask your healthcare professional. [place] [address] any warranty or liability for your use of this information. DISCHARGE SUMMARY from Nurse PATIENT INSTRUCTIONS: 
 
 
F-face looks uneven A-arms unable to move or move unevenly S-speech slurred or non-existent T-time-call 911 as soon as signs and symptoms begin-DO NOT go Back to bed or wait to see if you get better-TIME IS BRAIN.  
 
Warning Signs of HEART ATTACK  
 
 Call 911 if you have these symptoms: 
? Chest discomfort. Most heart attacks involve discomfort in the center of the chest that lasts more than a few minutes, or that goes away and comes back. It can feel like uncomfortable pressure, squeezing, fullness, or pain. ? Discomfort in other areas of the upper body. Symptoms can include pain or discomfort in one or both arms, the back, neck, jaw, or stomach. ? Shortness of breath with or without chest discomfort. ? Other signs may include breaking out in a cold sweat, nausea, or lightheadedness. Don't wait more than five minutes to call [address]! Fast action can save your life. Calling 911 is almost always the fastest way to get lifesaving treatment. Emergency Medical Services staff can begin treatment when they arrive  up to an hour sooner than if someone gets to the hospital by car. The discharge information has been reviewed with the {PATIENT PARENT GUARDIAN:06364}. The {PATIENT PARENT GUARDIAN:76987} verbalized understanding. Discharge medications reviewed with the {Dishcarge meds reviewed CMIP:12767} and appropriate educational materials and side effects teaching were provided. ___________________________________________________________________________________________________________________________________ Chart Review Routing History Recipient Method Report Sent By [facility] Bradly Duane., MD  
Fax: [phone] Phone: [phone] Fax Notes/Transcriptions Filomena Da Silva [[id number]] 4/15/2015  1:03 PM 04/15/2015 Bradly Duane., MD  
Fax: [phone] Phone: [phone] Fax Notes/Transcriptions India Carranza 1/6/2016  3:36 PM 01/06/2016

## 2018-06-13 NOTE — IP AVS SNAPSHOT
[address] [address] 
[phone] Patient: Armando Jacome MRN: [id number] BRAULIO:[birth year] A check rolando indicates which time of day the medication should be taken. My Medications START taking these medications Instructions Each Dose to Equal  
 Morning Noon Evening Bedtime  
 ciprofloxacin HCl 0.3 % ophthalmic solution Commonly known as:  Antonieta Casiano Administer 1 Drop to both eyes four (4) times daily. 1 Drop  
    
  
   
  
   
  
   
  
  
 levoFLOXacin 750 mg tablet Commonly known as:  Michelle Mcgee Take 1 Tab by mouth daily. 750 mg CONTINUE taking these medications Instructions Each Dose to Equal  
 Morning Noon Evening Bedtime  
 aspirin delayed-release 81 mg tablet Take  by mouth nightly. b complex vitamins tablet Take 1 Tab by mouth nightly. 1 Tab  
    
   
   
   
  
  
 carvedilol 6.25 mg tablet Commonly known as:  Linda Menghini Take 1 Tab by mouth two (2) times daily (with meals). 6.25 mg  
    
  
   
   
  
   
  
 cholecalciferol 1,000 unit tablet Commonly known as:  VITAMIN D3 Take 1,000 Units by mouth nightly. 1000 Units  
    
   
   
   
  
  
 clopidogrel 75 mg Tab Commonly known as:  PLAVIX Take 1 Tab by mouth daily. 75 mg  
    
  
   
   
   
  
 fexofenadine 180 mg tablet Commonly known as:  Bertis Knights Take 180 mg by mouth daily. Per anesthesia protocol:instructed to take am of surgery. Indications: ALLERGIC RHINITIS  
 180 mg FISH OIL CONCENTRATE PO Take  by mouth. fluticasone 50 mcg/actuation nasal spray Commonly known as:  FLONASE  
   
 USE 2 SPRAYS IN BOTH NOSTRILS DAILY. HYDROcodone-homatropine 5-1.5 mg/5 mL (5 mL) syrup Commonly known as:  HYCODAN  
   
 Take 5 mL by mouth four (4) times daily as needed. Max Daily Amount: 20 mL. 5 mL  
    
   
   
   
  
 latanoprost 0.005 % ophthalmic solution Commonly known as:  Yisel Harris Administer 1 Drop to both eyes nightly. 1 Drop  
    
   
   
   
  
  
 lisinopril 10 mg tablet Commonly known as:  Oreilly Maynard Take 1 Tab by mouth daily. 10 mg  
    
  
   
   
   
  
 MULTIVITAMIN 50 PLUS PO Take 1 Tab by mouth daily. 1 Tab  
    
  
   
   
   
  
 nitroglycerin 0.4 mg SL tablet Commonly known as:  NITROSTAT  
   
 1 Tab by SubLINGual route every five (5) minutes as needed for Chest Pain. Up to 3 doses. 0.4 mg  
    
   
   
   
  
 pantoprazole 40 mg tablet Commonly known as:  PROTONIX Take 1 Tab by mouth Daily (before breakfast). 40 mg  
    
  
   
   
   
  
 rosuvastatin 40 mg tablet Commonly known as:  CRESTOR Take 1 Tab by mouth nightly. 40 mg  
    
   
   
   
  
  
 spironolactone 25 mg tablet Commonly known as:  ALDACTONE Take 1 Tab by mouth daily. 25 mg  
    
  
   
   
   
  
 ticagrelor 90 mg tablet Commonly known as:  Anna[employer]Courtney [employer] Take 90 mg by mouth two (2) times a day. 90 mg  
    
  
   
   
  
   
  
 VISION-LAMONT + ZINC PO Take 1 Tab by mouth daily. 1 Tab Where to Get Your Medications These medications were sent to [address] [place] [address], [address] [place] [address] Street  [address] [place] [address]. [address] Phone:  [phone]  
  ciprofloxacin HCl 0.3 % ophthalmic solution  
 levoFLOXacin 750 mg tablet

## 2018-06-14 ENCOUNTER — PATIENT OUTREACH (OUTPATIENT)
Dept: CASE MANAGEMENT | Age: 83
End: 2018-06-14

## 2018-06-14 PROBLEM — R59.0 MEDIASTINAL ADENOPATHY: Status: ACTIVE | Noted: 2018-06-14

## 2018-06-14 LAB
ALBUMIN SERPL-MCNC: 3 G/DL (ref 3.2–4.6)
ALBUMIN/GLOB SERPL: 0.7 {RATIO} (ref 1.2–3.5)
ALP SERPL-CCNC: 90 U/L (ref 50–136)
ALT SERPL-CCNC: 29 U/L (ref 12–65)
ANION GAP SERPL CALC-SCNC: 11 MMOL/L (ref 7–16)
AST SERPL-CCNC: 33 U/L (ref 15–37)
ATRIAL RATE: 91 BPM
BASOPHILS # BLD: 0 K/UL (ref 0–0.2)
BASOPHILS NFR BLD: 0 % (ref 0–2)
BILIRUB SERPL-MCNC: 0.7 MG/DL (ref 0.2–1.1)
BUN SERPL-MCNC: 15 MG/DL (ref 8–23)
CALCIUM SERPL-MCNC: 8.7 MG/DL (ref 8.3–10.4)
CALCULATED P AXIS, ECG09: 13 DEGREES
CALCULATED R AXIS, ECG10: 69 DEGREES
CALCULATED T AXIS, ECG11: -34 DEGREES
CHLORIDE SERPL-SCNC: 103 MMOL/L (ref 98–107)
CHOLEST SERPL-MCNC: 78 MG/DL
CK SERPL-CCNC: 227 U/L (ref 21–215)
CO2 SERPL-SCNC: 20 MMOL/L (ref 21–32)
CREAT SERPL-MCNC: 1.25 MG/DL (ref 0.8–1.5)
CRP SERPL-MCNC: 13.4 MG/DL (ref 0–0.9)
DIAGNOSIS, 93000: NORMAL
DIFFERENTIAL METHOD BLD: ABNORMAL
EOSINOPHIL # BLD: 0.1 K/UL (ref 0–0.8)
EOSINOPHIL NFR BLD: 1 % (ref 0.5–7.8)
ERYTHROCYTE [DISTWIDTH] IN BLOOD BY AUTOMATED COUNT: 12.4 % (ref 11.9–14.6)
GLOBULIN SER CALC-MCNC: 4.3 G/DL (ref 2.3–3.5)
GLUCOSE SERPL-MCNC: 159 MG/DL (ref 65–100)
HCT VFR BLD AUTO: 38.4 % (ref 41.1–50.3)
HDLC SERPL-MCNC: 28 MG/DL (ref 40–60)
HDLC SERPL: 2.8 {RATIO}
HGB BLD-MCNC: 13.6 G/DL (ref 13.6–17.2)
IMM GRANULOCYTES # BLD: 0.1 K/UL (ref 0–0.5)
IMM GRANULOCYTES NFR BLD AUTO: 0 % (ref 0–5)
LDLC SERPL CALC-MCNC: 28.8 MG/DL
LIPID PROFILE,FLP: ABNORMAL
LYMPHOCYTES # BLD: 2.2 K/UL (ref 0.5–4.6)
LYMPHOCYTES NFR BLD: 15 % (ref 13–44)
MAGNESIUM SERPL-MCNC: 2.1 MG/DL (ref 1.8–2.4)
MCH RBC QN AUTO: 32.1 PG (ref 26.1–32.9)
MCHC RBC AUTO-ENTMCNC: 35.4 G/DL (ref 31.4–35)
MCV RBC AUTO: 90.6 FL (ref 79.6–97.8)
MONOCYTES # BLD: 1.2 K/UL (ref 0.1–1.3)
MONOCYTES NFR BLD: 8 % (ref 4–12)
NEUTS SEG # BLD: 10.5 K/UL (ref 1.7–8.2)
NEUTS SEG NFR BLD: 76 % (ref 43–78)
P-R INTERVAL, ECG05: 164 MS
PHOSPHATE SERPL-MCNC: 3 MG/DL (ref 2.3–3.7)
PLATELET # BLD AUTO: 256 K/UL (ref 150–450)
PMV BLD AUTO: 9 FL (ref 10.8–14.1)
POTASSIUM SERPL-SCNC: 4.1 MMOL/L (ref 3.5–5.1)
PROT SERPL-MCNC: 7.3 G/DL (ref 6.3–8.2)
Q-T INTERVAL, ECG07: 346 MS
QRS DURATION, ECG06: 84 MS
QTC CALCULATION (BEZET), ECG08: 425 MS
RBC # BLD AUTO: 4.24 M/UL (ref 4.23–5.67)
SODIUM SERPL-SCNC: 134 MMOL/L (ref 136–145)
TRIGL SERPL-MCNC: 106 MG/DL (ref 35–150)
TROPONIN I SERPL-MCNC: 0.68 NG/ML (ref 0.02–0.05)
TROPONIN I SERPL-MCNC: 0.92 NG/ML (ref 0.02–0.05)
VENTRICULAR RATE, ECG03: 91 BPM
VLDLC SERPL CALC-MCNC: 21.2 MG/DL (ref 6–23)
WBC # BLD AUTO: 14 K/UL (ref 4.3–11.1)

## 2018-06-14 PROCEDURE — 87070 CULTURE OTHR SPECIMN AEROBIC: CPT | Performed by: HOSPITALIST

## 2018-06-14 PROCEDURE — 77030027138 HC INCENT SPIROMETER -A

## 2018-06-14 PROCEDURE — 74011250637 HC RX REV CODE- 250/637: Performed by: HOSPITALIST

## 2018-06-14 PROCEDURE — 99223 1ST HOSP IP/OBS HIGH 75: CPT | Performed by: INTERNAL MEDICINE

## 2018-06-14 PROCEDURE — 80053 COMPREHEN METABOLIC PANEL: CPT | Performed by: HOSPITALIST

## 2018-06-14 PROCEDURE — 74011250636 HC RX REV CODE- 250/636: Performed by: INTERNAL MEDICINE

## 2018-06-14 PROCEDURE — 65660000000 HC RM CCU STEPDOWN

## 2018-06-14 PROCEDURE — 74011250636 HC RX REV CODE- 250/636: Performed by: HOSPITALIST

## 2018-06-14 PROCEDURE — 36415 COLL VENOUS BLD VENIPUNCTURE: CPT | Performed by: HOSPITALIST

## 2018-06-14 PROCEDURE — 74011000250 HC RX REV CODE- 250: Performed by: HOSPITALIST

## 2018-06-14 PROCEDURE — 74011000258 HC RX REV CODE- 258: Performed by: INTERNAL MEDICINE

## 2018-06-14 PROCEDURE — 84484 ASSAY OF TROPONIN QUANT: CPT | Performed by: HOSPITALIST

## 2018-06-14 PROCEDURE — 74011000258 HC RX REV CODE- 258: Performed by: HOSPITALIST

## 2018-06-14 PROCEDURE — 80061 LIPID PANEL: CPT | Performed by: HOSPITALIST

## 2018-06-14 RX ADMIN — SPIRONOLACTONE 25 MG: 25 TABLET, FILM COATED ORAL at 08:29

## 2018-06-14 RX ADMIN — ENOXAPARIN SODIUM 40 MG: 100 INJECTION SUBCUTANEOUS at 23:33

## 2018-06-14 RX ADMIN — CIPROFLOXACIN HYDROCHLORIDE 1 DROP: 3 SOLUTION/ DROPS OPHTHALMIC at 08:29

## 2018-06-14 RX ADMIN — CLOPIDOGREL BISULFATE 75 MG: 75 TABLET ORAL at 08:29

## 2018-06-14 RX ADMIN — FLUTICASONE PROPIONATE 2 SPRAY: 50 SPRAY, METERED NASAL at 08:29

## 2018-06-14 RX ADMIN — Medication 5 ML: at 21:49

## 2018-06-14 RX ADMIN — CARVEDILOL 6.25 MG: 6.25 TABLET, FILM COATED ORAL at 08:29

## 2018-06-14 RX ADMIN — CIPROFLOXACIN HYDROCHLORIDE 1 DROP: 3 SOLUTION/ DROPS OPHTHALMIC at 13:23

## 2018-06-14 RX ADMIN — PIPERACILLIN SODIUM,TAZOBACTAM SODIUM 4.5 G: 4; .5 INJECTION, POWDER, FOR SOLUTION INTRAVENOUS at 13:22

## 2018-06-14 RX ADMIN — VANCOMYCIN HYDROCHLORIDE 1000 MG: 1 INJECTION, POWDER, LYOPHILIZED, FOR SOLUTION INTRAVENOUS at 22:26

## 2018-06-14 RX ADMIN — PANTOPRAZOLE SODIUM 40 MG: 40 TABLET, DELAYED RELEASE ORAL at 08:29

## 2018-06-14 RX ADMIN — PIPERACILLIN SODIUM,TAZOBACTAM SODIUM 4.5 G: 4; .5 INJECTION, POWDER, FOR SOLUTION INTRAVENOUS at 21:46

## 2018-06-14 RX ADMIN — PIPERACILLIN SODIUM,TAZOBACTAM SODIUM 4.5 G: 4; .5 INJECTION, POWDER, FOR SOLUTION INTRAVENOUS at 05:03

## 2018-06-14 RX ADMIN — CIPROFLOXACIN HYDROCHLORIDE 1 DROP: 3 SOLUTION/ DROPS OPHTHALMIC at 19:50

## 2018-06-14 RX ADMIN — CIPROFLOXACIN HYDROCHLORIDE 1 DROP: 3 SOLUTION/ DROPS OPHTHALMIC at 21:32

## 2018-06-14 RX ADMIN — TOBRAMYCIN 440 MG: 40 INJECTION, SOLUTION INTRAMUSCULAR; INTRAVENOUS at 23:32

## 2018-06-14 RX ADMIN — LATANOPROST 1 DROP: 50 SOLUTION OPHTHALMIC at 00:26

## 2018-06-14 RX ADMIN — CIPROFLOXACIN HYDROCHLORIDE 1 DROP: 3 SOLUTION/ DROPS OPHTHALMIC at 15:42

## 2018-06-14 RX ADMIN — Medication 10 ML: at 05:06

## 2018-06-14 RX ADMIN — CIPROFLOXACIN HYDROCHLORIDE 1 DROP: 3 SOLUTION/ DROPS OPHTHALMIC at 00:24

## 2018-06-14 RX ADMIN — ROSUVASTATIN CALCIUM 40 MG: 20 TABLET, FILM COATED ORAL at 00:21

## 2018-06-14 RX ADMIN — CARVEDILOL 6.25 MG: 6.25 TABLET, FILM COATED ORAL at 17:35

## 2018-06-14 RX ADMIN — LISINOPRIL 10 MG: 5 TABLET ORAL at 08:28

## 2018-06-14 RX ADMIN — Medication 10 ML: at 05:08

## 2018-06-14 RX ADMIN — Medication 5 ML: at 13:23

## 2018-06-14 RX ADMIN — VANCOMYCIN HYDROCHLORIDE 1000 MG: 1 INJECTION, POWDER, LYOPHILIZED, FOR SOLUTION INTRAVENOUS at 09:24

## 2018-06-14 RX ADMIN — ENOXAPARIN SODIUM 40 MG: 100 INJECTION SUBCUTANEOUS at 00:21

## 2018-06-14 RX ADMIN — CIPROFLOXACIN HYDROCHLORIDE 1 DROP: 3 SOLUTION/ DROPS OPHTHALMIC at 23:35

## 2018-06-14 RX ADMIN — LATANOPROST 1 DROP: 50 SOLUTION OPHTHALMIC at 21:50

## 2018-06-14 RX ADMIN — VITAMIN D, TAB 1000IU (100/BT) 1000 UNITS: 25 TAB at 00:21

## 2018-06-14 RX ADMIN — VITAMIN D, TAB 1000IU (100/BT) 1000 UNITS: 25 TAB at 21:50

## 2018-06-14 RX ADMIN — ASPIRIN 81 MG: 81 TABLET, COATED ORAL at 08:29

## 2018-06-14 RX ADMIN — GUAIFENESIN AND DEXTROMETHORPHAN 10 ML: 100; 10 SYRUP ORAL at 00:36

## 2018-06-14 RX ADMIN — CIPROFLOXACIN HYDROCHLORIDE 1 DROP: 3 SOLUTION/ DROPS OPHTHALMIC at 09:24

## 2018-06-14 RX ADMIN — ROSUVASTATIN CALCIUM 40 MG: 20 TABLET, FILM COATED ORAL at 21:50

## 2018-06-14 RX ADMIN — CIPROFLOXACIN HYDROCHLORIDE 1 DROP: 3 SOLUTION/ DROPS OPHTHALMIC at 10:31

## 2018-06-14 RX ADMIN — CIPROFLOXACIN HYDROCHLORIDE 1 DROP: 3 SOLUTION/ DROPS OPHTHALMIC at 05:04

## 2018-06-14 RX ADMIN — CIPROFLOXACIN HYDROCHLORIDE 1 DROP: 3 SOLUTION/ DROPS OPHTHALMIC at 17:35

## 2018-06-14 RX ADMIN — LORATADINE 10 MG: 10 TABLET ORAL at 08:29

## 2018-06-14 RX ADMIN — B-COMPLEX W/ C & FOLIC ACID TAB 1 TABLET: TAB at 08:29

## 2018-06-14 NOTE — ED NOTES
TRANSFER - OUT REPORT:    Verbal report given to Kwame Rowell RN(name) on Sergei López  being transferred to 3rd floor(unit) for routine progression of care       Report consisted of patients Situation, Background, Assessment and   Recommendations(SBAR). Information from the following report(s) SBAR was reviewed with the receiving nurse. Lines:   Peripheral IV 06/13/18 Right Wrist (Active)       Peripheral IV 06/13/18 Left Antecubital (Active)        Opportunity for questions and clarification was provided.       Patient transported with:   Registered Nurse

## 2018-06-14 NOTE — PROGRESS NOTES
Bedside and Verbal shift change report given to Lobo Scott RN (oncoming nurse) by Sheila Burdick RN (offgoing nurse). Report included the following information SBAR, Kardex, Accordion and Recent Results.

## 2018-06-14 NOTE — H&P
Hospitalist H&P/Consult Note     Admit Date:  2018  5:45 PM   Name:  Melodie Saucedo   Age:  [de-identified] y.o.  :  1935   MRN:  [id number]   PCP:  Gladys Dominguez MD  Treatment Team: Attending Provider: Vandana Oakes MD    HPI:   Balaji [de-identified] y/o gentleman with hx CAD, HLD, HLD recently had a NSTEMI on  and underwent heart cath with stent placement x 3 and was discharged on 6/3. Three days after discharge he developed some cough. He was seen at Cardiology office around noon today for follow-up and from cardiac standpoint was doing well. Later in the day around 3 pm developed chest discomfort while breathing that was different from the chest pain associated with his MI. He took a NTG without relief. He has had a productive cough with yellow sputum. No fever chills or rigors. He has an elevated WBC ct of 15.5. A chest xray was unremarkable but a chest CT reveals patchy bilateral airspace opacities most notable in the upper lobes. Healthcare associated pneumonia suspected and was started on IV vancomycin, zosyn, tobramycin per protocol. Will admit for further treatment. Also has noticed some thick yellow discharge from both eyes. 10 systems reviewed and negative except as noted in HPI. 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 Taking?    BETA-CAROTENE,A, W-C & E/ZN/CU (VISION-LAMONT + ZINC PO)   Yes No   Sig: Take 1 Tab by mouth daily. HYDROcodone-homatropine (HYCODAN) 5-1.5 mg/5 mL (5 mL) syrup   No No   Sig: Take 5 mL by mouth four (4) times daily as needed. Max Daily Amount: 20 mL. MULTIVITAMIN W-MINERALS/LUTEIN (MULTIVITAMIN 50 PLUS PO)   Yes No   Sig: Take 1 Tab by mouth daily. OMEGA-3 FATTY ACIDS (FISH OIL CONCENTRATE PO)   Yes No   Sig: Take  by mouth. aspirin delayed-release 81 mg tablet   Yes No   Sig: Take  by mouth daily. b complex vitamins tablet   Yes No   Sig: Take 1 Tab by mouth daily. carvedilol (COREG) 6.25 mg tablet   No No   Sig: Take 1 Tab by mouth two (2) times daily (with meals). cholecalciferol (VITAMIN D3) 1,000 unit tablet   Yes No   Sig: Take 1,000 Units by mouth nightly. clopidogrel (PLAVIX) 75 mg tab   No No   Sig: Take 1 Tab by mouth daily. fexofenadine (ALLEGRA) 180 mg tablet   Yes No   Sig: Take 180 mg by mouth daily. Per anesthesia protocol:instructed to take am of surgery. Indications: ALLERGIC RHINITIS   fluticasone (FLONASE) 50 mcg/actuation nasal spray   No No   Sig: USE 2 SPRAYS IN BOTH NOSTRILS DAILY. latanoprost (XALATAN) 0.005 % ophthalmic solution   Yes No   Sig: Administer 1 Drop to both eyes nightly. lisinopril (PRINIVIL, ZESTRIL) 10 mg tablet   No No   Sig: Take 1 Tab by mouth daily. nitroglycerin (NITROSTAT) 0.4 mg SL tablet   No No   Si Tab by SubLINGual route every five (5) minutes as needed for Chest Pain. Up to 3 doses. pantoprazole (PROTONIX) 40 mg tablet   No No   Sig: Take 1 Tab by mouth Daily (before breakfast). rosuvastatin (CRESTOR) 40 mg tablet   No No   Sig: Take 1 Tab by mouth nightly. spironolactone (ALDACTONE) 25 mg tablet   No No   Sig: Take 1 Tab by mouth daily.       Facility-Administered Medications: None     Allergies   Allergen Reactions    Meloxicam Itching      Social History   Substance Use Topics    Smoking status: Former Smoker     Packs/day: 1.00     Years: 4.00     Quit date: 10/1/1960    Smokeless tobacco: Never Used   [facility] Alcohol use No      Family History   Problem Relation Age of Onset    Cancer Mother     Hypertension Mother     Diabetes Father     Heart Disease Father       Immunization History   Administered Date(s) Administered    Influenza High Dose Vaccine PF 10/08/2015, 10/20/2016, 09/06/2017    Influenza Vaccine 09/18/2013, 09/29/2014    Pneumococcal Conjugate (PCV-13) 03/30/2015    Pneumococcal Polysaccharide (PPSV-23) 09/18/2013    TB Skin Test (PPD) Intradermal 04/15/2015, 01/06/2016       Objective:     Patient Vitals for the past 24 hrs:   Temp Pulse Resp BP SpO2   06/13/18 2316 100.1 °F (37.8 °C) 97 18 152/74 98 %   06/13/18 1734 98.8 °F (37.1 °C) 93 18 148/81 96 %     Oxygen Therapy  O2 Sat (%): 98 % (06/13/18 2316)  O2 Device: Room air (06/13/18 1734)  No intake or output data in the 24 hours ending 06/13/18 2330    Physical Exam:  General:    Well nourished. Alert. Eyes:   Normal sclera. Extraocular movements intact. Bilateral conjunctivitis with thick yellow discharge  ENT:  Normocephalic, atraumatic. Moist mucous membranes  CV:   RRR. No murmur, rub, or gallop. Lungs:  Minimal wheeze bilaterally, no rhonchi  Abdomen: Soft, nontender, nondistended. Bowel sounds normal.   Extremities: Warm and dry. No cyanosis or edema. Neurologic: CN II-XII grossly intact. Sensation intact. Skin:     No rashes or jaundice. No wounds. Psych:  Normal mood and affect. I reviewed the labs, imaging, EKGs, telemetry, and other studies done this admission.   Data Review:   Recent Results (from the past 24 hour(s))   EKG, 12 LEAD, INITIAL    Collection Time: 06/13/18  5:38 PM   Result Value Ref Range    Ventricular Rate 91 BPM    Atrial Rate 91 BPM    P-R Interval 164 ms    QRS Duration 84 ms    Q-T Interval 346 ms    QTC Calculation (Bezet) 425 ms    Calculated P Axis 13 degrees    Calculated R Axis 69 degrees    Calculated T Axis -34 degrees    Diagnosis       Normal sinus rhythm  T wave abnormality, consider inferior ischemia  Abnormal ECG  When compared with ECG of 01-JUN-2018 13:47,  Questionable change in QRS axis  T wave inversion now evident in Inferior leads  Inverted T waves have replaced nonspecific T wave abnormality in Anterior   leads     CBC WITH AUTOMATED DIFF    Collection Time: 06/13/18  5:45 PM   Result Value Ref Range    WBC 15.5 (H) 4.3 - 11.1 K/uL    RBC 4.13 (L) 4.23 - 5.67 M/uL    HGB 13.1 (L) 13.6 - 17.2 g/dL    HCT 37.0 (L) 41.1 - 50.3 %    MCV 89.6 79.6 - 97.8 FL    MCH 31.7 26.1 - 32.9 PG    MCHC 35.4 (H) 31.4 - 35.0 g/dL    RDW 12.3 11.9 - 14.6 %    PLATELET 264 362 - 112 K/uL    MPV 8.9 (L) 10.8 - 14.1 FL    NEUTROPHILS 77 43 - 78 %    LYMPHOCYTES 14 13 - 44 %    MONOCYTES 8 4.0 - 12.0 %    EOSINOPHILS 1 0.5 - 7.8 %    BASOPHILS 0 0.0 - 2.0 %    IMMATURE GRANULOCYTES 1 0.0 - 5.0 %    ABS. NEUTROPHILS 11.9 (H) 1.7 - 8.2 K/UL    ABS. LYMPHOCYTES 2.2 0.5 - 4.6 K/UL    ABS. MONOCYTES 1.2 0.1 - 1.3 K/UL    ABS. EOSINOPHILS 0.2 0.0 - 0.8 K/UL    ABS. BASOPHILS 0.0 0.0 - 0.2 K/UL    ABS. IMM. GRANS. 0.2 0.0 - 0.5 K/UL    RBC COMMENTS NORMOCYTIC/NORMOCHROMIC      WBC COMMENTS Result Confirmed By Smear      PLATELET COMMENTS ADEQUATE      DF AUTOMATED     METABOLIC PANEL, COMPREHENSIVE    Collection Time: 06/13/18  5:45 PM   Result Value Ref Range    Sodium 130 (L) 136 - 145 mmol/L    Potassium 4.6 3.5 - 5.1 mmol/L    Chloride 95 (L) 98 - 107 mmol/L    CO2 25 21 - 32 mmol/L    Anion gap 10 7 - 16 mmol/L    Glucose 122 (H) 65 - 100 mg/dL    BUN 18 8 - 23 MG/DL    Creatinine 1.24 0.8 - 1.5 MG/DL    GFR est AA >60 >60 ml/min/1.73m2    GFR est non-AA 60 (L) >60 ml/min/1.73m2    Calcium 9.2 8.3 - 10.4 MG/DL    Bilirubin, total 0.6 0.2 - 1.1 MG/DL    ALT (SGPT) 29 12 - 65 U/L    AST (SGOT) 31 15 - 37 U/L    Alk.  phosphatase 84 50 - 136 U/L    Protein, total 7.9 6.3 - 8.2 g/dL    Albumin 3.4 3.2 - 4.6 g/dL    Globulin 4.5 (H) 2.3 - 3.5 g/dL    A-G Ratio 0.8 (L) 1.2 - 3.5     TROPONIN I    Collection Time: 06/13/18  5:45 PM   Result Value Ref Range    Troponin-I, Qt. 0.79 (HH) 0.02 - 0.05 NG/ML   PROCALCITONIN    Collection Time: 06/13/18  5:45 PM   Result Value Ref Range    Procalcitonin 0.1 ng/mL   POC LACTIC ACID    Collection Time: 06/13/18  7:32 PM   Result Value Ref Range    Lactic Acid (POC) 0.8 0.5 - 1.9 mmol/L       Imaging Studies:  CXR Results  (Last 48 hours)               06/13/18 1804  XR CHEST PA LAT Final result    Impression:  IMPRESSION:   No acute cardiopulmonary process. Narrative:  Frontal and lateral views of the chest        Comparison: 06/01/2018       Indication: chest pain       FINDINGS: The cardiac and mediastinal contours are within normal limits. There   is no focal pulmonary infiltrate, nodule, effusion, or pneumothorax. No   discrete acute osseous lesion seen. CT Results  (Last 48 hours)               06/13/18 2005  CT CHEST W CONT Final result    Impression:  IMPRESSION:   1. No evidence of pulmonary embolism. 2. Patchy bilateral airspace opacities most notable within the upper lobes. 2. Scattered mildly enlarged lymph nodes. These findings could be reactive   although additional etiologies including metastatic disease or lymphoma can have   similar imaging features. Narrative:  CT chest with contrast (pulmonary embolism protocol): 06/13/2018       History: New onset chest pain today, stent placement 1 week ago. Technique: Helically acquired images were obtained from the lung apices to the   domes of the diaphragms reconstructed at 2.5mm intervals after the uneventful   administration of 100 cc's intravenous Isovue-370 in order to evaluate the   pulmonary arteries. Coronal reformatted images were submitted.        Radiation dose reduction techniques were used for this study:  Our CT scanners   use one or all of the following: Automated exposure control, adjustment of the   mA and/or kVp according to patient's size, iterative reconstruction. Comparison: None       Findings: There is no pleural or pericardial effusion. The heart and great   vessels are unremarkable. There are patchy airspace opacities within the   bilateral upper lobes anteromedially. There are similar but less impressive   nodular airspace opacities elsewhere within the right upper and bilateral lower   lobes. There are several mildly prominent mediastinal and hilar lymph nodes. For   example a left hilar lymph node measuring 1.4 cm with a low density component. Right lower paratracheal lymph node measures up to 1 cm in short access. There   is a small hiatal hernia. Assessment and Plan:     Hospital Problems as of 6/13/2018  Date Reviewed: 6/12/2018          Codes Class Noted - Resolved POA    * (Principal)HCAP (healthcare-associated pneumonia) ICD-10-CM: J18.9  ICD-9-CM: 486  6/13/2018 - Present Yes        Chest pain ICD-10-CM: R07.9  ICD-9-CM: 786.50  6/13/2018 - Present Yes        Coronary artery disease involving native coronary artery of native heart ICD-10-CM: I25.10  ICD-9-CM: 414.01  6/13/2018 - Present Yes    Overview Addendum 6/13/2018 12:04 PM by Esperanza Hughes MD     1. NSTEMI (6/4/18): Peak troponin 11. EF 40-45%. Multi-vessel PCI. mLAD:  Synergy 3.5 x16 mm AKASH. mCirc:  Synergy 3 x 20 mm AKASH. mRCA:  Resolute [place] 5 x 22 mm AKASH. Ischemic cardiomyopathy ICD-10-CM: I25.5  ICD-9-CM: 414.8  6/1/2018 - Present Yes    Overview Signed 6/13/2018 12:06 PM by Esperanza Hughes MD     1. LV gram:  EF 40-45% with apical hypokinesis. 2.  Echo (6/2/18):  EF 50-55%. No signnificant valve disease.               Bilateral conjunctivitis ICD-10-CM: H10.9  ICD-9-CM: 372.30  6/13/2018 - Present Yes        Dyslipidemia ICD-10-CM: E78.5  ICD-9-CM: 272.4  6/1/2018 - Present Yes        Hypertension ICD-10-CM: I10  ICD-9-CM: 401.9  Unknown - Present Yes    Overview Signed 10/3/2015 10:44 AM by [employer]     controlled w/med                   PLAN:  · Admit inpatient to telemetry  · Continue broad-spectrum antibiotics for HCAP organisms,  · IV vancomycin/zosyn/tobramycin  · Monitor renal function closely  · Trend troponin. Elevated troponin tonight likely sequela from recent MI  · Hycodan and mucinex DM for cough  · Incentive spirometry and flutter valve. Prn albuterol nebs  · Continue ASA and plavix for recent stents (3)  · F/u blood cultures.  Check sputum culture  · cipro eyes drops for conjunctivitis  · Continue antihypertensives and statin  · lovenox for DVT prophylaxis  · Discussed with family at bedside      Estimated LOS:  2 midnights minimum    Signed:  Curt Allred MD

## 2018-06-14 NOTE — ED PROVIDER NOTES
HPI Comments: Presents with complaint of  Chest pain cough and pain with deep breathing. He recently had a heart catheter with stents but this pain is different. He's been coughing up yellow sputum. He's been taking Dilantin and aspirin. He denies any fever. Patient is a [de-identified] y.o. male presenting with chest pain. The history is provided by the patient. Chest Pain (Angina)    This is a new problem. The current episode started 6 to 12 hours ago. The problem has been gradually worsening. The problem occurs constantly. The pain is present in the left side. The pain is moderate. The quality of the pain is described as sharp. The pain does not radiate. The symptoms are aggravated by deep breathing. Associated symptoms include shortness of breath. Pertinent negatives include no diaphoresis, no exertional chest pressure, no fever, no leg pain, no lower extremity edema, no nausea and no vomiting. He has tried nothing for the symptoms. Procedural history includes cardiac catheterization and cardiac stents. 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    Thromboembolus ([address] 8/2002    LLE; due to fall       Past Surgical History:   Procedure Laterality Date    HX CATARACT REMOVAL Right     w/lens implant    HX COLONOSCOPY      HX KNEE REPLACEMENT Left 4/2015         Family History:   Problem Relation Age of Onset    Cancer Mother     Hypertension Mother     Diabetes Father     Heart Disease Father        Social History     Social History    Marital status:      Spouse name: N/A    Number of children: N/A    Years of education: N/A     Occupational History    Not on file.      Social History Main Topics    Smoking status: Former Smoker     Packs/day: 1.00     Years: 4.00     Quit date: 10/1/1960    Smokeless tobacco: Never Used    Alcohol use No    Drug use: No    Sexual activity: Not on file     Other Topics Concern    Not on file     Social History Narrative         ALLERGIES: Meloxicam    Review of Systems   Constitutional: Negative for diaphoresis and fever. Respiratory: Positive for shortness of breath. Cardiovascular: Positive for chest pain. Gastrointestinal: Negative for nausea and vomiting. All other systems reviewed and are negative. Vitals:    06/13/18 1734   BP: 148/81   Pulse: 93   Resp: 18   Temp: 98.8 °F (37.1 °C)   SpO2: 96%   Weight: 103.9 kg (229 lb)   Height: 6' (1.829 m)            Physical Exam   Constitutional: He is oriented to person, place, and time. He appears well-developed and well-nourished. No distress. HENT:   Head: Normocephalic and atraumatic. Neck: Normal range of motion. Neck supple. Cardiovascular: Normal rate and regular rhythm. Pulmonary/Chest: Effort normal. No respiratory distress. He has no wheezes. He has no rales. Abdominal: Soft. He exhibits no distension. There is no tenderness. There is no rebound and no guarding. Musculoskeletal: Normal range of motion. He exhibits no edema. Neurological: He is alert and oriented to person, place, and time. No cranial nerve deficit. Coordination normal.   Skin: Skin is warm and dry. No rash noted. He is not diaphoretic. No erythema. Psychiatric: He has a normal mood and affect. His behavior is normal.   Nursing note and vitals reviewed. MDM  Number of Diagnoses or Management Options  Diagnosis management comments: Patient with elevated troponin I suspect is from his recent NSTEMI. His white count is elevated and this chest CT shows multiple infiltrates  Bilaterally. He'll be treated for hospital-acquired pneumonia and admitted to the hospitalist service.        Amount and/or Complexity of Data Reviewed  Clinical lab tests: ordered and reviewed  Review and summarize past medical records: yes  Discuss the patient with other providers: yes  Independent visualization of images, tracings, or specimens: yes (T-wave inversions inferiorly unclear if this is an acute change or post stent as the only EKG I have is prior to his stents.)    Risk of Complications, Morbidity, and/or Mortality  Presenting problems: high  Diagnostic procedures: moderate  Management options: high    Patient Progress  Patient progress: improved        ED Course       Procedures

## 2018-06-14 NOTE — PROGRESS NOTES
Bedside and Verbal shift change report given to Dimitry Sullivan RN (oncoming nurse) by self Kamila Ramos nurse). Report included the following information SBAR, Kardex, MAR and Recent Results.

## 2018-06-14 NOTE — PROGRESS NOTES
TRANSFER - IN REPORT:    Verbal report received from ELISE Metzger(name) on Loren Garcia  being received from ER (unit) for routine progression of care      Report consisted of patients Situation, Background, Assessment and   Recommendations(SBAR). Information from the following report(s) SBAR, Kardex and ED Summary was reviewed with the receiving nurse. Opportunity for questions and clarification was provided. Assessment completed upon patients arrival to unit and care assumed.

## 2018-06-14 NOTE — PROGRESS NOTES
Hospitalist Progress Note    2018  Admit Date: 2018  5:45 PM   NAME: Astrid Murray   :  1935   MRN:  [id number]   Attending: Silvino Cuellar MD  PCP:  Zackery Hanson MD    SUBJECTIVE:   Patient was seen and examined at bedside this morning. Wife present. He denies any chest pain or SOB but does have a cough. He has left shoulder pain which is different from the pain he had during recent MI. Denies any weight loss or night sweats. Review of Systems negative with exception of pertinent positives noted above  PHYSICAL EXAM     Visit Vitals    /53 (BP 1 Location: Right arm, BP Patient Position: At rest)    Pulse 76    Temp 98.9 °F (37.2 °C)    Resp 18    Ht 6' (1.829 m)    Wt 103.9 kg (229 lb)    SpO2 95%    BMI 31.06 kg/m2      Temp (24hrs), Av.8 °F (37.1 °C), Min:97.1 °F (36.2 °C), Max:100.1 °F (37.8 °C)    Oxygen Therapy  O2 Sat (%): 95 % (18 1318)  O2 Device: Room air (18 0818)    Intake/Output Summary (Last 24 hours) at 18 1442  Last data filed at 18 0834   Gross per 24 hour   Intake                0 ml   Output              100 ml   Net             -100 ml      General: No acute distress     Eyes:  Bilateral eye discharge  Lungs:  Minimal bilateral crackles  Heart:  Regular rate and rhythm,  No murmur, rub, or gallop  Abdomen: Soft, Non distended, Non tender, Positive bowel sounds  Extremities: No cyanosis, clubbing or edema  Neurologic:  No focal deficits    ASSESSMENT      Active Hospital Problems    Diagnosis Date Noted    Mediastinal adenopathy 2018    Chest pain 2018    HCAP (healthcare-associated pneumonia) 2018    Coronary artery disease involving native coronary artery of native heart 2018     1. NSTEMI (18): Peak troponin 11. EF 40-45%. Multi-vessel PCI. mLAD:  Synergy 3.5 x16 mm AKASH. mCirc:  Synergy 3 x 20 mm AKASH. mRCA:  Resolute [place] 5 x 22 mm AKASH.         Bilateral conjunctivitis 06/13/2018    Dyslipidemia 06/01/2018    Ischemic cardiomyopathy 06/01/2018     1. LV gram:  EF 40-45% with apical hypokinesis. 2.  Echo (6/2/18):  EF 50-55%. No signnificant valve disease.        Hypertension      controlled w/med       Plan:  · HCAP- continue with Vancomycin, Tobramycin and Zosyn, follow up cultures  · CAD s/p recent PCI with 3 stents- continue ASA, Plavix, follow up cardiology  · Ischemic Cardiomyopathy- c/w ACEI, BB, Aldactone  · HLD- statin  · HTN- continue current meds  · Bilateral Conjunctivitis- Cipro eye drops    DVT Prophylaxis: Lovenox SQ    Signed By: Vandana Grant MD     June 14, 2018

## 2018-06-14 NOTE — CONSULTS
CONSULT NOTE    Nikki Teixeira    6/14/2018    Date of Admission:  6/13/2018    The patient's chart is reviewed and the patient is discussed with the staff. Subjective:     Patient is a [de-identified] y.o.  male seen and evaluated at the request of Dr. Barry Treadwell. He presented to the ER with complaints of chest pain worse with deep breaths. Has had productive cough with yellow sputum. Has CAD with NSTEMI 6/1/18 and had 3 stents placed. Is currently being treated for conjunctivitis. Has chronic HLD, HTN, previous thromboembolus after a fall in 2002, bilateral TKAs. CXR with possible infiltrate and antibiotics added buy hospitalist service for HCAP. Chest CT was done with no PE, patchy infiltrates in upper lobes and mildly enlarged lymph nodes concerning for metastatic disease or lymphoma. WBC 15.5 on admission. We are asked to see for pneumonia vs. Malignancy. Currently sitting on the side of the bed eating lunch and states is feeling better today. On room air and denies shortness of breath. Has occasionally productive cough but less today. States still has some left chest discomfort at times when takes a deep breath. Has had a \"cold\" for the last week and has been \" coughing a lot\". Review of Systems  A comprehensive review of systems was negative except for: Constitutional: positive for fatigue  Respiratory: positive for cough, sputum or left chest pain with deep breath  Cardiovascular: positive for CAD with PCI 6/1/1/ x3  Musculoskeletal: positive for Hx left leg DVT after a fall in 2002, treated with anticoagulation for 3 months.     Patient Active Problem List   Diagnosis Code    S/P total knee arthroplasty Z96.659    H/O thromboembolism Z86.718    Snoring R06.83    Arthritis M19.90    Hypertension I10    Prediabetes R73.03    Status post total right knee replacement Z96.651    Encounter for long-term (current) use of medications Z79.899    Allergic rhinitis J30.9    Sebaceous cyst L72.3    Lumbar stenosis with neurogenic claudication M48.062    Macular degeneration H35.30    NSTEMI (non-ST elevated myocardial infarction) [facility]) I21.4    Dyslipidemia E78.5    Ischemic cardiomyopathy I25.5    Coronary artery disease involving native coronary artery of native heart I25.10    Chest pain R07.9    HCAP (healthcare-associated pneumonia) J18.9    Bilateral conjunctivitis H10.9    Mediastinal adenopathy R59.0       Home DME company none. Prior to Admission Medications   Prescriptions Last Dose Informant Patient Reported? Taking? BETA-CAROTENE,A, W-C & E/ZN/CU (VISION-LAMONT + ZINC PO) 6/13/2018 at Unknown time  Yes Yes   Sig: Take 1 Tab by mouth daily. HYDROcodone-homatropine (HYCODAN) 5-1.5 mg/5 mL (5 mL) syrup   No No   Sig: Take 5 mL by mouth four (4) times daily as needed. Max Daily Amount: 20 mL. MULTIVITAMIN W-MINERALS/LUTEIN (MULTIVITAMIN 50 PLUS PO) 6/13/2018 at Unknown time  Yes Yes   Sig: Take 1 Tab by mouth daily. OMEGA-3 FATTY ACIDS (FISH OIL CONCENTRATE PO) 6/13/2018 at Unknown time  Yes Yes   Sig: Take  by mouth. aspirin delayed-release 81 mg tablet 6/13/2018 at Unknown time  Yes Yes   Sig: Take  by mouth nightly. b complex vitamins tablet 6/13/2018 at Unknown time  Yes Yes   Sig: Take 1 Tab by mouth nightly. carvedilol (COREG) 6.25 mg tablet 6/13/2018 at Unknown time  No Yes   Sig: Take 1 Tab by mouth two (2) times daily (with meals). cholecalciferol (VITAMIN D3) 1,000 unit tablet 6/13/2018 at Unknown time  Yes Yes   Sig: Take 1,000 Units by mouth nightly. clopidogrel (PLAVIX) 75 mg tab Not Taking at Unknown time  No No   Sig: Take 1 Tab by mouth daily. fexofenadine (ALLEGRA) 180 mg tablet 6/13/2018 at Unknown time  Yes Yes   Sig: Take 180 mg by mouth daily. Per anesthesia protocol:instructed to take am of surgery.   Indications: ALLERGIC RHINITIS   fluticasone (FLONASE) 50 mcg/actuation nasal spray 6/13/2018 at Unknown time No Yes   Sig: USE 2 SPRAYS IN BOTH NOSTRILS DAILY. latanoprost (XALATAN) 0.005 % ophthalmic solution 2018 at Unknown time  Yes Yes   Sig: Administer 1 Drop to both eyes nightly. lisinopril (PRINIVIL, ZESTRIL) 10 mg tablet 2018 at Unknown time  No Yes   Sig: Take 1 Tab by mouth daily. nitroglycerin (NITROSTAT) 0.4 mg SL tablet 2018 at Unknown time  No Yes   Si Tab by SubLINGual route every five (5) minutes as needed for Chest Pain. Up to 3 doses. pantoprazole (PROTONIX) 40 mg tablet 2018 at Unknown time  No Yes   Sig: Take 1 Tab by mouth Daily (before breakfast). rosuvastatin (CRESTOR) 40 mg tablet 2018 at Unknown time  No Yes   Sig: Take 1 Tab by mouth nightly. spironolactone (ALDACTONE) 25 mg tablet 2018 at Unknown time  No Yes   Sig: Take 1 Tab by mouth daily. ticagrelor (BRILINTA) 90 mg tablet Not Taking at Unknown time  Yes No   Sig: Take 90 mg by mouth two (2) times a day. Facility-Administered Medications: None       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    left     Status post total left knee replacement 2016    Status post total right knee replacement 2016    Thromboembolus ([address] 2002    LLE; due to fall     Past Surgical History:   Procedure Laterality Date    HX CATARACT REMOVAL Right     w/lens implant    HX COLONOSCOPY      HX KNEE REPLACEMENT Left 2015     Social History     Social History    Marital status:      Spouse name: N/A    Number of children: N/A    Years of education: N/A     Occupational History    Not on file.      Social History Main Topics    Smoking status: Former Smoker     Packs/day: 1.00     Years: 4.00     Quit date: 10/1/1960    Smokeless tobacco: Never Used    Alcohol use No    Drug use: No    Sexual activity: Not on file     Other Topics Concern    Not on file     Social History Narrative     Family History   Problem Relation Age of Onset    Cancer Mother     Hypertension Mother     Diabetes Father     Heart Disease Father      Allergies   Allergen Reactions    Meloxicam Itching       Current Facility-Administered Medications   Medication Dose Route Frequency    vancomycin (VANCOCIN) 1,000 mg in 0.9% sodium chloride (MBP/ADV) 250 mL  1 g IntraVENous Q12H    tobramycin (NEBCIN) 400 mg in 0.9% sodium chloride 100 mL IVPB  400 mg IntraVENous Q24H    loratadine (CLARITIN) tablet 10 mg  10 mg Oral DAILY    latanoprost (XALATAN) 0.005 % ophthalmic solution 1 Drop  1 Drop Both Eyes QHS    multivitamin, stress formula (STRESS TAB) tablet 1 Tab  1 Tab Oral DAILY    cholecalciferol (VITAMIN D3) tablet 1,000 Units  1,000 Units Oral QHS    aspirin delayed-release tablet 81 mg  81 mg Oral DAILY    fluticasone (FLONASE) 50 mcg/actuation nasal spray 2 Spray  2 Spray Both Nostrils DAILY    carvedilol (COREG) tablet 6.25 mg  6.25 mg Oral BID WITH MEALS    rosuvastatin (CRESTOR) tablet 40 mg  40 mg Oral QHS    lisinopril (PRINIVIL, ZESTRIL) tablet 10 mg  10 mg Oral DAILY    spironolactone (ALDACTONE) tablet 25 mg  25 mg Oral DAILY    pantoprazole (PROTONIX) tablet 40 mg  40 mg Oral ACB    nitroglycerin (NITROSTAT) tablet 0.4 mg  0.4 mg SubLINGual Q5MIN PRN    HYDROcodone-homatropine (HYCODAN) 5-1.5 mg/5 mL (5 mL) syrup 5 mL  5 mL Oral Q6H PRN    clopidogrel (PLAVIX) tablet 75 mg  75 mg Oral DAILY    sodium chloride (NS) flush 5-10 mL  5-10 mL IntraVENous Q8H    sodium chloride (NS) flush 5-10 mL  5-10 mL IntraVENous PRN    sodium chloride (NS) flush 5-10 mL  5-10 mL IntraVENous Q8H    sodium chloride (NS) flush 5-10 mL  5-10 mL IntraVENous PRN    morphine injection 2 mg  2 mg IntraVENous Q4H PRN    acetaminophen (TYLENOL) tablet 650 mg  650 mg Oral Q4H PRN    bisacodyl (DULCOLAX) tablet 5 mg  5 mg Oral DAILY PRN    ondansetron [facility]) injection 4 mg  4 mg IntraVENous Q4H PRN    guaiFENesin-dextromethorphan (ROBITUSSIN DM) 100-10 mg/5 mL syrup 10 mL  10 mL Oral Q6H PRN    enoxaparin (LOVENOX) injection 40 mg  40 mg SubCUTAneous Q24H    albuterol (PROVENTIL VENTOLIN) nebulizer solution 2.5 mg  2.5 mg Nebulization Q6H PRN    piperacillin-tazobactam (ZOSYN) 4.5 g in 0.9% sodium chloride (MBP/ADV) 100 mL  4.5 g IntraVENous Q8H    ciprofloxacin HCl (CILOXAN) 0.3 % ophthalmic solution 1 Drop  1 Drop Both Eyes Q2H         Objective:     Vitals:    06/14/18 0547 06/14/18 0828 06/14/18 0842 06/14/18 1318   BP: 129/60 138/68 147/61 121/53   Pulse: 88 85 77 76   Resp: 18 17 18   Temp: 99.5 °F (37.5 °C) 98.1 °F (36.7 °C) 97.1 °F (36.2 °C) 98.9 °F (37.2 °C)   SpO2: 95%  96% 95%   Weight: 229 lb (103.9 kg)      Height:           PHYSICAL EXAM     Constitutional:  the patient is well developed and in no acute distress, room air sat 96%  EENMT:  Sclera clear, pupils equal, oral mucosa moist  Respiratory: few bibasilar crackles, no wheezing  Cardiovascular:  RRR without M,G,R  Gastrointestinal: soft and non-tender; with positive bowel sounds. Musculoskeletal: warm without cyanosis. There is no lower leg edema. Skin:  no jaundice or rashes, scabbed abrasion right shin   Neurologic: no gross neuro deficits     Psychiatric:  alert and oriented x 3    CXR 6/13/18:        Chest CT 6/13/18:    1. No evidence of pulmonary embolism. 2. Patchy bilateral airspace opacities most notable within the upper lobes. 3. Scattered mildly enlarged lymph nodes. These findings could be reactive although additional etiologies including metastatic disease or lymphoma can have similar imaging features.   \      Recent Labs      06/13/18   2342  06/13/18   1745   WBC  14.0*  15.5*   HGB  13.6  13.1*   HCT  38.4*  37.0*   PLT  256  277     Recent Labs      06/14/18   0614  06/13/18   2342  06/13/18   1745  06/12/18   1053   NA  134*   --   130*  132*   K  4.1   --   4.6 4.9   CL  103   --   95*  93*   GLU  159*   --   122*  142*   CO2  20*   --   25  22   BUN  15   --   18  15   CREA  1.25   --   1.24  1.11   MG   --   2.1   --   2.2   PHOS   --   3.0   --    --    CA  8.7   --   9.2  9.7   TROIQ  0.68*  0.92*  0.79*   --    ALB  3.0*   --   3.4   --    TBILI  0.7   --   0.6   --    ALT  29   --   29   --    SGOT  33   --   31   --      No results for input(s): PH, PCO2, PO2, HCO3 in the last 72 hours. No results for input(s): LCAD, LAC in the last 72 hours. Assessment:  (Medical Decision Making)     Hospital Problems  Date Reviewed: 6/14/2018          Codes Class Noted POA    Mediastinal adenopathy ICD-10-CM: R59.0  ICD-9-CM: 785.6  6/14/2018 Unknown    Will follow up imaging when pna treated as outpatient. Coronary artery disease involving native coronary artery of native heart ICD-10-CM: I25.10  ICD-9-CM: 414.01  6/13/2018 Yes    Overview Addendum 6/13/2018 12:04 PM by Dixie Clark MD     1. NSTEMI (6/4/18): Peak troponin 11. EF 40-45%. Multi-vessel PCI. mLAD:  Synergy 3.5 x16 mm AKASH. mCirc:  Synergy 3 x 20 mm AKASH. mRCA:  Resolute Gerhard 5 x 22 mm AKASH. Chest pain ICD-10-CM: R07.9  ICD-9-CM: 786.50  6/13/2018 Yes    Anterior chest wall at site of infiltrates    * (Principal)HCAP (healthcare-associated pneumonia) ICD-10-CM: J18.9  ICD-9-CM: 486  6/13/2018 Yes    Continue coverage as ordered and narrow according to cultures    Bilateral conjunctivitis ICD-10-CM: H10.9  ICD-9-CM: 372.30  6/13/2018 Yes        Dyslipidemia ICD-10-CM: E78.5  ICD-9-CM: 272.4  6/1/2018 Yes        Ischemic cardiomyopathy ICD-10-CM: I25.5  ICD-9-CM: 414.8  6/1/2018 Yes    Overview Signed 6/13/2018 12:06 PM by Dixie Clark MD     1. LV gram:  EF 40-45% with apical hypokinesis. 2.  Echo (6/2/18):  EF 50-55%. No signnificant valve disease.               Hypertension ICD-10-CM: I10  ICD-9-CM: 401.9  Unknown Yes    Overview Signed 10/3/2015 10:44 AM by Alize Mckenna controlled w/med                   Plan:  (Medical Decision Making)     --Tobramycin, Vancomycin, Zosyn day 2  --Blood cultures:  Need to f/u.  --CRP elevated 13.4    More than 50% of the time documented was spent in face-to-face contact with the patient and in the care of the patient on the floor/unit where the patient is located. Thank you very much for this referral.  We appreciate the opportunity to participate in this patient's care. Will follow along with above stated plan. Steven Funez MD     I have spoken with and examined the patient. I agree with the above assessment and plan as documented. Steva Ormond is a [de-identified] with h/o DVT 15 years ago, recent hospitalization for stent who is now admitted with  nodular bilateral anterior upper lobe opacities, mediastinal lymphadenopathy, cough and leukocytosis. His clinical presentation is of left chest pain, cough, coryza, purulent sputum production. No chronic weight loss, night sweats. Suspect L anterior chest pain is related to pulmonary infiltrate located there. Gen: pleasant on ra  Lungs:  CTA  Heart:  RRR with no Murmur/Rubs/Gallops  Abd: +BS  Ext: no edema    --agree with current HCAP coverage  --narrow according to cultures  --will arrange f/u imaging as outpatient in 8-12 weeks.         Steven Funez MD

## 2018-06-14 NOTE — CONSULTS
[facility] Cardiology Consult                Date of  Admission: 6/13/2018  5:45 PM     Primary Care Physician: Dr. Renata Da Silva  Primary Cardiologist: Dr. Kaylah Bell  Referring Physician: Dr. Marj Guajardo Physician: Dr. Kaylah Bell    CC/Reason for consult: Readmission after MI and revascularization      Ross Marx is a [de-identified] y.o. male admitted for HCAP (healthcare-associated pneumonia)  Chest pain. He has a history of myocardial infarction requiring revascularization with stent placement  on 6/1. Pt was seen for his hospital follow up appointment in the office on 6/13. He then presented to the ER later that evening for chest pain that described as different than the pain with his MI and productive cough. Today he denies any chest pain. He has mild discomfort with breathing and coughing. His cough is productive with a yellow sputum. He is also being treated for bilateral conjunctivitis. He denies any SOB. His troponin was mildly elevated on arrival but markedly less than during his previous admission.     PMHx: CAD, Recent MI, HTN, dyslipidemia    Patient Active Problem List   Diagnosis Code    S/P total knee arthroplasty Z96.659    H/O thromboembolism Z86.718    Snoring R06.83    Arthritis M19.90    Hypertension I10    Prediabetes R73.03    Status post total right knee replacement Z96.651    Encounter for long-term (current) use of medications Z79.899    Allergic rhinitis J30.9    Sebaceous cyst L72.3    Lumbar stenosis with neurogenic claudication M48.062    Macular degeneration H35.30    NSTEMI (non-ST elevated myocardial infarction) ([facility] [address] I21.4    Dyslipidemia E78.5    Ischemic cardiomyopathy I25.5    Coronary artery disease involving native coronary artery of native heart I25.10    Chest pain R07.9    HCAP (healthcare-associated pneumonia) J18.9    Bilateral conjunctivitis H10.9       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    Environmental allergies  Hypertension     managed w/med    Osteoarthritis     S/P total knee arthroplasty 4/15/2015    left     Status post total left knee replacement 1/6/2016    Status post total right knee replacement 1/11/2016    Thromboembolus ([address] 8/2002    LLE; due to fall      Past Surgical History:   Procedure Laterality Date    HX CATARACT REMOVAL Right     w/lens implant    HX COLONOSCOPY      HX KNEE REPLACEMENT Left 4/2015     Allergies   Allergen Reactions    Meloxicam Itching      Family History   Problem Relation Age of Onset    Cancer Mother     Hypertension Mother     Diabetes Father     Heart Disease Father         Current Facility-Administered Medications   Medication Dose Route Frequency    vancomycin (VANCOCIN) 1,000 mg in 0.9% sodium chloride (MBP/ADV) 250 mL  1 g IntraVENous Q12H    tobramycin (NEBCIN) 400 mg in 0.9% sodium chloride 100 mL IVPB  400 mg IntraVENous Q24H    loratadine (CLARITIN) tablet 10 mg  10 mg Oral DAILY    latanoprost (XALATAN) 0.005 % ophthalmic solution 1 Drop  1 Drop Both Eyes QHS    multivitamin, stress formula (STRESS TAB) tablet 1 Tab  1 Tab Oral DAILY    cholecalciferol (VITAMIN D3) tablet 1,000 Units  1,000 Units Oral QHS    aspirin delayed-release tablet 81 mg  81 mg Oral DAILY    fluticasone (FLONASE) 50 mcg/actuation nasal spray 2 Spray  2 Spray Both Nostrils DAILY    carvedilol (COREG) tablet 6.25 mg  6.25 mg Oral BID WITH MEALS    rosuvastatin (CRESTOR) tablet 40 mg  40 mg Oral QHS    lisinopril (PRINIVIL, ZESTRIL) tablet 10 mg  10 mg Oral DAILY    spironolactone (ALDACTONE) tablet 25 mg  25 mg Oral DAILY    pantoprazole (PROTONIX) tablet 40 mg  40 mg Oral ACB    nitroglycerin (NITROSTAT) tablet 0.4 mg  0.4 mg SubLINGual Q5MIN PRN    HYDROcodone-homatropine (HYCODAN) 5-1.5 mg/5 mL (5 mL) syrup 5 mL  5 mL Oral Q6H PRN    clopidogrel (PLAVIX) tablet 75 mg  75 mg Oral DAILY    sodium chloride (NS) flush 5-10 mL  5-10 mL IntraVENous Q8H    sodium chloride (NS) flush 5-10 mL  5-10 mL IntraVENous PRN    sodium chloride (NS) flush 5-10 mL  5-10 mL IntraVENous Q8H    sodium chloride (NS) flush 5-10 mL  5-10 mL IntraVENous PRN    morphine injection 2 mg  2 mg IntraVENous Q4H PRN    acetaminophen (TYLENOL) tablet 650 mg  650 mg Oral Q4H PRN    bisacodyl (DULCOLAX) tablet 5 mg  5 mg Oral DAILY PRN    ondansetron (ZOFRAN) injection 4 mg  4 mg IntraVENous Q4H PRN    guaiFENesin-dextromethorphan (ROBITUSSIN DM) 100-10 mg/5 mL syrup 10 mL  10 mL Oral Q6H PRN    enoxaparin (LOVENOX) injection 40 mg  40 mg SubCUTAneous Q24H    albuterol (PROVENTIL VENTOLIN) nebulizer solution 2.5 mg  2.5 mg Nebulization Q6H PRN    piperacillin-tazobactam (ZOSYN) 4.5 g in 0.9% sodium chloride (MBP/ADV) 100 mL  4.5 g IntraVENous Q8H    ciprofloxacin HCl (CILOXAN) 0.3 % ophthalmic solution 1 Drop  1 Drop Both Eyes Q2H       Review of Systems   Constitution: Negative for chills, fever, weakness, malaise/fatigue, weight gain and weight loss. HENT: Negative for ear pain, hearing loss, nosebleeds, sore throat and tinnitus. Eyes: Positive for discharge. Negative for blurred vision, vision loss in left eye and vision loss in right eye. Cardiovascular: Negative for chest pain, dyspnea on exertion, leg swelling, near-syncope, orthopnea, palpitations, paroxysmal nocturnal dyspnea and syncope. Respiratory: Positive for cough and sputum production. Negative for hemoptysis, shortness of breath and wheezing. Endocrine: Negative for cold intolerance, heat intolerance and polydipsia. Hematologic/Lymphatic: Does not bruise/bleed easily. Skin: Negative for color change and rash. Musculoskeletal: Negative for back pain, joint pain, joint swelling and myalgias. Gastrointestinal: Negative for abdominal pain, constipation, diarrhea, dysphagia, heartburn, hematemesis, melena, nausea and vomiting. Genitourinary: Negative for dysuria, frequency, hematuria and urgency. Neurological: Negative for difficulty with concentration, dizziness, headaches, light-headedness, numbness, paresthesias, seizures and vertigo. Psychiatric/Behavioral: Negative for altered mental status and depression. Physical Exam  Vitals:    06/13/18 2316 06/14/18 0547 06/14/18 0828 06/14/18 0842   BP:  129/60 138/68 147/61   Pulse:  88 85 77   Resp: 18 18 17   Temp:  99.5 °F (37.5 °C) 98.1 °F (36.7 °C) 97.1 °F (36.2 °C)   SpO2: 98% 95%  96%   Weight: 104.3 kg (229 lb 14.4 oz) 103.9 kg (229 lb)     Height: 6' (1.829 m)          Physical Exam:  General: Well Developed, Well Nourished, No Acute Distress  HEENT: pupils equal and round, obvious discharge bilateraklly  Neck: supple, no JVD  Heart: S1S2 with RRR   Lungs: Mild wheezing  Abd: soft, nontender, nondistended, with good bowel sounds  Ext: warm, no edema, calves supple/nontender, pulses 2+ bilaterally  Skin: warm and dry  Psychiatric: Normal mood and affect  Neurologic: Alert and oriented X 3      Cardiographics   ECG: NSR with T wave inversion    Labs:   Recent Labs      06/14/18   0614  06/13/18   2342  06/13/18   1745   06/12/18   1053   NA  134*   --   130*   --   132*   K  4.1   --   4.6   --   4.9   MG   --   2.1   --    --   2.2   BUN  15   --   18   --   15   CREA  1.25   --   1.24   --   1.11   GLU  159*   --   122*   --   142*   WBC   --   14.0*  15.5*   --    --    HGB   --   13.6  13.1*   --    --    HCT   --   38.4*  37.0*   --    --    PLT   --   256  277   --    --    CHOL  78   --    --    --    --    HDL  28*   --    --    --    --    CHHD  2.8   --    --    --    --    LDLC  28.8   --    --    --    --    VLDL  21.2   --    --    --    --    TROIQ  0.68*  0.92*  0.79*   < >   --     < > = values in this interval not displayed.        Lab Results   Component Value Date/Time    Cholesterol, total 78 06/14/2018 06:14 AM    HDL Cholesterol 28 (L) 06/14/2018 06:14 AM    LDL, calculated 28.8 06/14/2018 06:14 AM    VLDL, calculated 21.2 06/14/2018 06:14 AM    Triglyceride 106 06/14/2018 06:14 AM    CHOL/HDL Ratio 2.8 06/14/2018 06:14 AM     All Cardiac Markers in the last 24 hours:    Lab Results   Component Value Date/Time    TROIQ 0.68 () 06/14/2018 06:14 AM    TROIQ 0.92 ([facility]) 06/13/2018 11:42 PM    TROIQ 0.79 ([facility]) 06/13/2018 05:45 PM         Assessment/Plan:      Principal Problem:    HCAP (healthcare-associated pneumonia) (6/13/2018)    On IV vancomycin/zosyn/tobramycin    Active Problems:    Hypertension ()    Continue Lisinopril, Carvedilol, Spironolactone      Dyslipidemia (6/1/2018)   Continue statin      Ischemic cardiomyopathy (6/1/2018)   Not volume overloaded on exam, Continue Lisinopril, Carvedilol, Spironolactone      Coronary artery disease involving native coronary artery of native heart (6/13/2018)     Continue Plavix, Aspirin, Lisinopril, Carvedilol, and Statin      Chest pain (6/13/2018)    Describes it as different than the pain with his MI, Troponin trending down from recent admission, EKG showed inverted T-waves in inferior leads      Bilateral conjunctivitis (6/13/2018)    On Cipro eye drops      Thank you very much for this referral. We appreciate the opportunity to participate in this patient's care. We will follow along with above stated plan. Hannah Sterling PA-C  Consulting MD: Dr. Julia Dumont     6/14/2018     10:05 PM    I have personally seen and examined Melodie Saucedo with [facility]. I agree and confirm findings in history, physical exam, and assessment/plan as outlined above with following pertinent additions/exceptions:   Patient with recent MI and multi-vessel PCI. Developed cough and pleuritic chest pain. Chest CT shows:  1. No evidence of pulmonary embolism. 2. Patchy bilateral airspace opacities most notable within the upper lobes. 2. Scattered mildly enlarged lymph nodes.  These findings could be reactive  although additional etiologies including metastatic disease or lymphoma can have  similar imaging features. Admitted by hospitalist for hospital acquired pneumonia. Pleuritic chest pain much improved over last 24 hours. Minimal residual symptoms. Troponin mildly elevated but trending down consistent with recent MI. He has been compliant with dual anti-platelet therapy. PE: CV: RRR  L: scattered rales in upper lung segments. E: no edema. ASS/Plan;  As above. Continue ASA and Brilinta. Pleuritic chest pain likely related to infectious process. No plans for repeat ischemia evaluation.  Will follow with primary team.   Vic Cruz MD

## 2018-06-14 NOTE — PROGRESS NOTES
Bedside and Verbal shift change report received from Renee Esparza RN (offgoing nurse). Report included the following information SBAR, Kardex, MAR and Recent Results.

## 2018-06-14 NOTE — PROGRESS NOTES
Care Management Interventions  PCP Verified by CM: Yes  Palliative Care Criteria Met (RRAT>21 & CHF Dx)?: No (RRAT 19 Dx Pneumonia chest pain )  Transition of Care Consult (CM Consult): Discharge Planning  Discharge Durable Medical Equipment: No  Physical Therapy Consult: No  Occupational Therapy Consult: No  Speech Therapy Consult: No  Current Support Network: Lives with Spouse  Confirm Follow Up Transport: Self  Plan discussed with Pt/Family/Caregiver: Yes  Freedom of Choice Offered: Yes  Discharge Location  Discharge Placement: Home  Met with patient for d/c planning. Patient alert and oriented x 3, independent of ADL's and lives with wife. He is able to drive and requires no DME at home. He has [employer] for his prescription plan and obtains his medications with mail order or [facility]. PCP is Dr. Renata Da Silva who he saw 6/12/18. Discharge plan is home with wife when medically stable.

## 2018-06-14 NOTE — PROGRESS NOTES
Pharmacokinetic Consult to Pharmacist    Joellen David is a [de-identified] y.o. male being treated for HCAP with vancomycin. Height: 6' (182.9 cm)  Weight: 104.3 kg (229 lb 14.4 oz)  Lab Results   Component Value Date/Time    BUN 18 06/13/2018 05:45 PM    Creatinine 1.24 06/13/2018 05:45 PM    WBC 14.0 (H) 06/13/2018 11:42 PM    Procalcitonin 0.1 06/13/2018 05:45 PM    Lactic Acid (POC) 0.8 06/13/2018 07:32 PM      Estimated Creatinine Clearance: 59.3 mL/min (based on Cr of 1.24). CULTURES:  All Micro Results     Procedure Component Value Units Date/Time    CULTURE, RESPIRATORY/SPUTUM/BRONCH Cindy Shipman [[id number]]     Order Status:  Sent Specimen:  Sputum from Sputum     CULTURE, BLOOD [[id number]] Collected:  06/13/18 1844    Order Status:  Completed Specimen:  Blood from Blood Updated:  06/13/18 1943    CULTURE, BLOOD [[id number]] Collected:  06/13/18 1920    Order Status:  Completed Specimen:  Blood from Blood Updated:  06/13/18 1942            Day 1 of vancomycin. Goal trough is 15-20. Vancomycin dose initiated at 2000mg load, followed by 1000mg q24h. Predicted trough of 18.9   Will continue to follow patient.       Thank you,  Gerardo Stover, PharmD

## 2018-06-14 NOTE — PROGRESS NOTES
Problem: Falls - Risk of  Goal: *Absence of Falls  Document Bao Fall Risk and appropriate interventions in the flowsheet.    Outcome: Progressing Towards Goal  Fall Risk Interventions:  Medication Interventions: Patient to call before getting OOB, Teach patient to arise slowly

## 2018-06-14 NOTE — PROGRESS NOTES
Initial visit made to patient and a prayer was provided to the patient and his wife.         [employer]

## 2018-06-14 NOTE — PROGRESS NOTES
Problem: Unstable angina/NSTEMI: Day of Admission/Day 1  Goal: *Hemodynamically stable  Outcome: Resolved/Met Date Met: 06/14/18  Visit Vitals    /74    Pulse 97    Temp 100.1 °F (37.8 °C)    Resp 18    Ht 6' (1.829 m)    Wt 104.3 kg (229 lb 14.4 oz)    SpO2 98%    BMI 31.18 kg/m2       Goal: *Optimal pain control at patient's stated goal  Outcome: Resolved/Met Date Met: 06/14/18  Pt free of pain on a scale of 0-10.

## 2018-06-14 NOTE — PROGRESS NOTES
Problem: Falls - Risk of  Goal: *Absence of Falls  Document Bao Fall Risk and appropriate interventions in the flowsheet. Outcome: Progressing Towards Goal  Fall Risk Interventions:            Medication Interventions: Patient to call before getting OOB                  Problem: Patient Education: Go to Patient Education Activity  Goal: Patient/Family Education  Outcome: Progressing Towards Goal  Fall precautions in place. Red socks on. Instructed to only get OOB with assistance. Voiced understanding. Call light in reach.

## 2018-06-15 PROBLEM — E78.5 DYSLIPIDEMIA: Chronic | Status: ACTIVE | Noted: 2018-06-01

## 2018-06-15 PROBLEM — I25.10 CORONARY ARTERY DISEASE INVOLVING NATIVE CORONARY ARTERY OF NATIVE HEART: Chronic | Status: ACTIVE | Noted: 2018-06-13

## 2018-06-15 PROBLEM — I25.5 ISCHEMIC CARDIOMYOPATHY: Chronic | Status: ACTIVE | Noted: 2018-06-01

## 2018-06-15 LAB
ALBUMIN SERPL-MCNC: 2.7 G/DL (ref 3.2–4.6)
ALBUMIN/GLOB SERPL: 0.6 {RATIO} (ref 1.2–3.5)
ALP SERPL-CCNC: 90 U/L (ref 50–136)
ALT SERPL-CCNC: 31 U/L (ref 12–65)
ANION GAP SERPL CALC-SCNC: 10 MMOL/L (ref 7–16)
AST SERPL-CCNC: 32 U/L (ref 15–37)
BILIRUB SERPL-MCNC: 0.4 MG/DL (ref 0.2–1.1)
BUN SERPL-MCNC: 17 MG/DL (ref 8–23)
CALCIUM SERPL-MCNC: 8.7 MG/DL (ref 8.3–10.4)
CHLORIDE SERPL-SCNC: 104 MMOL/L (ref 98–107)
CO2 SERPL-SCNC: 23 MMOL/L (ref 21–32)
CREAT SERPL-MCNC: 1.11 MG/DL (ref 0.8–1.5)
ERYTHROCYTE [DISTWIDTH] IN BLOOD BY AUTOMATED COUNT: 12.2 % (ref 11.9–14.6)
GLOBULIN SER CALC-MCNC: 4.3 G/DL (ref 2.3–3.5)
GLUCOSE SERPL-MCNC: 101 MG/DL (ref 65–100)
HCT VFR BLD AUTO: 34 % (ref 41.1–50.3)
HGB BLD-MCNC: 11.8 G/DL (ref 13.6–17.2)
MCH RBC QN AUTO: 31.2 PG (ref 26.1–32.9)
MCHC RBC AUTO-ENTMCNC: 34.7 G/DL (ref 31.4–35)
MCV RBC AUTO: 89.9 FL (ref 79.6–97.8)
PLATELET # BLD AUTO: 258 K/UL (ref 150–450)
PMV BLD AUTO: 9 FL (ref 10.8–14.1)
POTASSIUM SERPL-SCNC: 4 MMOL/L (ref 3.5–5.1)
PROT SERPL-MCNC: 7 G/DL (ref 6.3–8.2)
RBC # BLD AUTO: 3.78 M/UL (ref 4.23–5.67)
SODIUM SERPL-SCNC: 137 MMOL/L (ref 136–145)
TOBRAMYCIN SERPL-MCNC: 3.5 UG/ML
VANCOMYCIN TROUGH SERPL-MCNC: 12 UG/ML (ref 5–20)
WBC # BLD AUTO: 10.1 K/UL (ref 4.3–11.1)

## 2018-06-15 PROCEDURE — 74011000258 HC RX REV CODE- 258: Performed by: HOSPITALIST

## 2018-06-15 PROCEDURE — 74011250636 HC RX REV CODE- 250/636: Performed by: HOSPITALIST

## 2018-06-15 PROCEDURE — 36415 COLL VENOUS BLD VENIPUNCTURE: CPT | Performed by: INTERNAL MEDICINE

## 2018-06-15 PROCEDURE — 74011250636 HC RX REV CODE- 250/636: Performed by: INTERNAL MEDICINE

## 2018-06-15 PROCEDURE — 99232 SBSQ HOSP IP/OBS MODERATE 35: CPT | Performed by: INTERNAL MEDICINE

## 2018-06-15 PROCEDURE — 80053 COMPREHEN METABOLIC PANEL: CPT | Performed by: INTERNAL MEDICINE

## 2018-06-15 PROCEDURE — 65660000000 HC RM CCU STEPDOWN

## 2018-06-15 PROCEDURE — 74011250637 HC RX REV CODE- 250/637: Performed by: HOSPITALIST

## 2018-06-15 PROCEDURE — 85027 COMPLETE CBC AUTOMATED: CPT | Performed by: INTERNAL MEDICINE

## 2018-06-15 PROCEDURE — 77030020120 HC VLV RESP PEP HI -B

## 2018-06-15 PROCEDURE — 74011000258 HC RX REV CODE- 258: Performed by: INTERNAL MEDICINE

## 2018-06-15 PROCEDURE — 80202 ASSAY OF VANCOMYCIN: CPT | Performed by: HOSPITALIST

## 2018-06-15 PROCEDURE — 80200 ASSAY OF TOBRAMYCIN: CPT | Performed by: INTERNAL MEDICINE

## 2018-06-15 PROCEDURE — 83520 IMMUNOASSAY QUANT NOS NONAB: CPT | Performed by: INTERNAL MEDICINE

## 2018-06-15 RX ADMIN — CIPROFLOXACIN HYDROCHLORIDE 1 DROP: 3 SOLUTION/ DROPS OPHTHALMIC at 23:32

## 2018-06-15 RX ADMIN — ROSUVASTATIN CALCIUM 40 MG: 20 TABLET, FILM COATED ORAL at 21:00

## 2018-06-15 RX ADMIN — CIPROFLOXACIN HYDROCHLORIDE 1 DROP: 3 SOLUTION/ DROPS OPHTHALMIC at 21:30

## 2018-06-15 RX ADMIN — CIPROFLOXACIN HYDROCHLORIDE 1 DROP: 3 SOLUTION/ DROPS OPHTHALMIC at 13:15

## 2018-06-15 RX ADMIN — Medication 10 ML: at 16:48

## 2018-06-15 RX ADMIN — CIPROFLOXACIN HYDROCHLORIDE 1 DROP: 3 SOLUTION/ DROPS OPHTHALMIC at 03:49

## 2018-06-15 RX ADMIN — Medication 10 ML: at 22:31

## 2018-06-15 RX ADMIN — PIPERACILLIN SODIUM,TAZOBACTAM SODIUM 4.5 G: 4; .5 INJECTION, POWDER, FOR SOLUTION INTRAVENOUS at 20:59

## 2018-06-15 RX ADMIN — CIPROFLOXACIN HYDROCHLORIDE 1 DROP: 3 SOLUTION/ DROPS OPHTHALMIC at 07:25

## 2018-06-15 RX ADMIN — TOBRAMYCIN 440 MG: 40 INJECTION, SOLUTION INTRAMUSCULAR; INTRAVENOUS at 22:36

## 2018-06-15 RX ADMIN — CIPROFLOXACIN HYDROCHLORIDE 1 DROP: 3 SOLUTION/ DROPS OPHTHALMIC at 16:48

## 2018-06-15 RX ADMIN — PANTOPRAZOLE SODIUM 40 MG: 40 TABLET, DELAYED RELEASE ORAL at 05:47

## 2018-06-15 RX ADMIN — VITAMIN D, TAB 1000IU (100/BT) 1000 UNITS: 25 TAB at 20:59

## 2018-06-15 RX ADMIN — VANCOMYCIN HYDROCHLORIDE 1000 MG: 1 INJECTION, POWDER, LYOPHILIZED, FOR SOLUTION INTRAVENOUS at 22:29

## 2018-06-15 RX ADMIN — Medication 5 ML: at 21:02

## 2018-06-15 RX ADMIN — CARVEDILOL 6.25 MG: 6.25 TABLET, FILM COATED ORAL at 16:47

## 2018-06-15 RX ADMIN — PIPERACILLIN SODIUM,TAZOBACTAM SODIUM 4.5 G: 4; .5 INJECTION, POWDER, FOR SOLUTION INTRAVENOUS at 13:16

## 2018-06-15 RX ADMIN — Medication 5 ML: at 05:49

## 2018-06-15 RX ADMIN — CIPROFLOXACIN HYDROCHLORIDE 1 DROP: 3 SOLUTION/ DROPS OPHTHALMIC at 10:28

## 2018-06-15 RX ADMIN — FLUTICASONE PROPIONATE 2 SPRAY: 50 SPRAY, METERED NASAL at 08:06

## 2018-06-15 RX ADMIN — LATANOPROST 1 DROP: 50 SOLUTION OPHTHALMIC at 22:31

## 2018-06-15 RX ADMIN — CIPROFLOXACIN HYDROCHLORIDE 1 DROP: 3 SOLUTION/ DROPS OPHTHALMIC at 01:29

## 2018-06-15 RX ADMIN — Medication 10 ML: at 05:42

## 2018-06-15 RX ADMIN — CIPROFLOXACIN HYDROCHLORIDE 1 DROP: 3 SOLUTION/ DROPS OPHTHALMIC at 15:00

## 2018-06-15 RX ADMIN — CIPROFLOXACIN HYDROCHLORIDE 1 DROP: 3 SOLUTION/ DROPS OPHTHALMIC at 08:45

## 2018-06-15 RX ADMIN — CIPROFLOXACIN HYDROCHLORIDE 1 DROP: 3 SOLUTION/ DROPS OPHTHALMIC at 19:12

## 2018-06-15 RX ADMIN — LISINOPRIL 10 MG: 5 TABLET ORAL at 08:03

## 2018-06-15 RX ADMIN — ASPIRIN 81 MG: 81 TABLET, COATED ORAL at 08:03

## 2018-06-15 RX ADMIN — VANCOMYCIN HYDROCHLORIDE 1000 MG: 1 INJECTION, POWDER, LYOPHILIZED, FOR SOLUTION INTRAVENOUS at 10:27

## 2018-06-15 RX ADMIN — CLOPIDOGREL BISULFATE 75 MG: 75 TABLET ORAL at 08:03

## 2018-06-15 RX ADMIN — CIPROFLOXACIN HYDROCHLORIDE 1 DROP: 3 SOLUTION/ DROPS OPHTHALMIC at 05:27

## 2018-06-15 RX ADMIN — ENOXAPARIN SODIUM 40 MG: 100 INJECTION SUBCUTANEOUS at 23:55

## 2018-06-15 RX ADMIN — LORATADINE 10 MG: 10 TABLET ORAL at 08:03

## 2018-06-15 RX ADMIN — CARVEDILOL 6.25 MG: 6.25 TABLET, FILM COATED ORAL at 08:03

## 2018-06-15 RX ADMIN — PIPERACILLIN SODIUM,TAZOBACTAM SODIUM 4.5 G: 4; .5 INJECTION, POWDER, FOR SOLUTION INTRAVENOUS at 05:42

## 2018-06-15 RX ADMIN — SPIRONOLACTONE 25 MG: 25 TABLET, FILM COATED ORAL at 08:03

## 2018-06-15 RX ADMIN — B-COMPLEX W/ C & FOLIC ACID TAB 1 TABLET: TAB at 08:03

## 2018-06-15 NOTE — PROGRESS NOTES
Problem: Pneumonia: Day 2  Goal: Activity/Safety  Outcome: Progressing Towards Goal  Pt up ad rocky. Less SOB. Goal: Medications  Outcome: Progressing Towards Goal  Pt continues on IV abx.   Beginning to have some loose stools

## 2018-06-15 NOTE — PROGRESS NOTES
Bedside and Verbal shift change report given to Claudia Bragg, RN and Russ Dong RN (oncoming nurse) by self Josué Hargrove nurse).  Report included the following information SBAR, Kardex, ED Summary, Procedure Summary, Intake/Output, MAR, Recent Results and Cardiac Rhythm SR.

## 2018-06-15 NOTE — PROGRESS NOTES
Bedside and Verbal shift change report given to self (oncoming nurse) by Patricia Perez RN (offgoing nurse).  Report included the following information SBAR, Kardex, ED Summary, Procedure Summary, Intake/Output, MAR, Recent Results and Cardiac Rhythm SR.

## 2018-06-15 NOTE — PROGRESS NOTES
Hospitalist Progress Note    6/15/2018  Admit Date: 2018  5:45 PM   NAME: Loren Garcia   :  1935   MRN:  [id number]   Attending: Hamida Sloan MD  PCP:  Silvia Longo MD    SUBJECTIVE:   Patient was seen and examined at bedside this morning. Wife present. He has no complaints this morning denying any chest pain, SOB, cough, fevers, chills, diarrhea. Review of Systems negative with exception of pertinent positives noted above  PHYSICAL EXAM     Visit Vitals    /64 (BP Patient Position: At rest)    Pulse 85    Temp 98.7 °F (37.1 °C)    Resp 17    Ht 6' (1.829 m)    Wt 102.5 kg (226 lb)    SpO2 97%    BMI 30.65 kg/m2      Temp (24hrs), Av °F (37.2 °C), Min:98.5 °F (36.9 °C), Max:100.2 °F (37.9 °C)    Oxygen Therapy  O2 Sat (%): 97 % (06/15/18 1323)  O2 Device: Room air (06/15/18 0729)    Intake/Output Summary (Last 24 hours) at 06/15/18 1335  Last data filed at 06/15/18 0549   Gross per 24 hour   Intake              185 ml   Output              675 ml   Net             -490 ml      General: No acute distress     Eyes:  Bilateral eyes slightly erythematous  Lungs:  Minimal bilateral crackles  Heart:  Regular rate and rhythm,  No murmur, rub, or gallop  Abdomen: Soft, Non distended, Non tender, Positive bowel sounds  Extremities: No cyanosis, clubbing or edema  Neurologic:  No focal deficits    ASSESSMENT      Active Hospital Problems    Diagnosis Date Noted    Mediastinal adenopathy 2018    Chest pain 2018    HCAP (healthcare-associated pneumonia) 2018    Coronary artery disease involving native coronary artery of native heart 2018     1. NSTEMI (18): Peak troponin 11. EF 40-45%. Multi-vessel PCI. mLAD:  Synergy 3.5 x16 mm AKASH. mCirc:  Synergy 3 x 20 mm AKASH. mRCA:  Resolute Gerhard 5 x 22 mm AKASH.  Bilateral conjunctivitis 2018    Dyslipidemia 2018    Ischemic cardiomyopathy 2018     1.  LV gram:  EF 40-45% with apical hypokinesis. 2.  Echo (6/2/18):  EF 50-55%. No signnificant valve disease.  Hypertension      controlled w/med       Plan:  · HCAP- continue with Vancomycin, Tobramycin and Zosyn, follow up cultures, BC negative to date, sputum pending, will have follow up imaging in 8-10 weeks.   · CAD s/p recent PCI with 3 stents- continue ASA, Plavix, follow up cardiology  · Ischemic Cardiomyopathy- c/w ACEI, BB, Aldactone  · HLD- statin  · HTN- continue current meds  · Bilateral Conjunctivitis- Cipro eye drops    DVT Prophylaxis: Lovenox SQ    Signed By: Caitlyn Aparicio MD     Yissel 15, 2018

## 2018-06-15 NOTE — PROGRESS NOTES
Verbal bedside report given to [facility], oncoming RN. Patient's situation, background, assessment and recommendations provided. Opportunity for questions provided. Oncoming RN assumed care of patient.

## 2018-06-15 NOTE — PROGRESS NOTES
Problem: Falls - Risk of  Goal: *Absence of Falls  Document Bao Fall Risk and appropriate interventions in the flowsheet. Outcome: Progressing Towards Goal  Fall Risk Interventions:            Medication Interventions: Teach patient to arise slowly       Patient progressing towards goal with no falls on current admission. Patient without confusion, agitation, or sensory perception deficits. Patient has steady gait on ambulation. Personal belongings are within reach. Bed is in the low and locked position with side rails up x2. Yellow gripper socks to bilateral feet. Call light within reach and patient verbalizes understanding of use. Problem: Pneumonia: Day 2  Goal: *Oxygen saturation within defined limits  Outcome: Resolved/Met Date Met: 06/14/18  Patient's oxygen saturation has been 95-98% on room air. Goal: *Hemodynamically stable  Outcome: Resolved/Met Date Met: 06/14/18  Patient has been hemodynamically stable on current day with BP 110s-150s/50s-100s and HR 70s-90s. Goal: *Demonstrates progressive activity  Outcome: Resolved/Met Date Met: 06/14/18  Patient has been up to the bathroom and ambulating in room on current shift. Goal: *Tolerating diet  Outcome: Resolved/Met Date Met: 06/14/18  Patient has eaten well on current day. Goal: *Optimal pain control at patient's stated goal  Outcome: Resolved/Met Date Met: 06/14/18  Patient has denied pain on current shift.

## 2018-06-15 NOTE — PROGRESS NOTES
[facility] CARDIOLOGY PROGRESS NOTE    6/15/2018 8:20 AM    Admit Date: 6/13/2018    Admit Diagnosis: HCAP (healthcare-associated pneumonia); Chest pain      Subjective:   Stable overnight without angina, CHF, or palpitations. Vitals stable and controlled. No other complaints overnight. Tolerating meds well. Objective:      Vitals:    06/14/18 2038 06/15/18 0129 06/15/18 0527 06/15/18 0759   BP: 119/57 114/54 132/60 139/66   Pulse: 82 98 87 87   Resp: 16 16 16 16   Temp: 98.5 °F (36.9 °C) 99 °F (37.2 °C) 100.2 °F (37.9 °C) 99 °F (37.2 °C)   SpO2: 96% 95% 96% 94%   Weight:   102.5 kg (226 lb)    Height:           Physical Exam:  Neck- supple, no JVDm, conjunctivitis improved  CV- regular rate and rhythm no MRG  Lung- clear bilaterally  Abd- soft, nontender, nondistended  Ext- no edema  Skin- warm and dry    Data Review:   Recent Labs      06/15/18   0454  06/14/18   0614  06/13/18   2342   06/12/18   1053   NA  137  134*   --    < >  132*   K  4.0  4.1   --    < >  4.9   MG   --    --   2.1   --   2.2   BUN  17  15   --    < >  15   CREA  1.11  1.25   --    < >  1.11   GLU  101*  159*   --    < >  142*   WBC  10.1   --   14.0*   < >   --    HGB  11.8*   --   13.6   < >   --    HCT  34.0*   --   38.4*   < >   --    PLT  258   --   256   < >   --    CHOL   --   78   --    --    --    TRIGL   --   106   --    --    --    HDL   --   28*   --    --    --     < > = values in this interval not displayed.        Assessment and Plan:     Principal Problem:    HCAP (healthcare-associated pneumonia) (6/13/2018)    On IV vancomycin/zosyn/tobramycin     Active Problems:    Hypertension ()    Continue Lisinopril, Carvedilol, Spironolactone       Dyslipidemia (6/1/2018)   Continue statin       Ischemic cardiomyopathy (6/1/2018)   Not volume overloaded on exam, Continue Lisinopril, Carvedilol, Spironolactone       Coronary artery disease involving native coronary artery of native heart (6/13/2018)     Continue Plavix, Aspirin, Lisinopril, Carvedilol, and Statin       Chest pain (6/13/2018)- resolved, none overnight, monitor clinically. Trop trending down from prior admit. No invasive cardiac procedures planned for now. Describes it as different than the pain with his MI, Troponin trending down from recent admission, EKG showed inverted T-waves in inferior leads       Bilateral conjunctivitis (6/13/2018)    On Cipro eye drops    BMP, CBC in AM    A.  Jeancarlos Jo MD  [address] Cardiology  Pager [phone]

## 2018-06-15 NOTE — PROGRESS NOTES
Bedside and Verbal shift change report given to self (oncoming nurse) by Tamy Benavides RN (offgoing nurse).  Report included the following information SBAR, Kardex, ED Summary, Procedure Summary, Intake/Output, MAR, Recent Results and Cardiac Rhythm SR.

## 2018-06-15 NOTE — PROGRESS NOTES
Cathy Hodges  Admission Date: 6/13/2018             Daily Progress Note: 6/15/2018    The patient's chart is reviewed and the patient is discussed with the staff. 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 He presented with left chest pain, cough, coryza, and purulent sputum production. He is being tx for bilateral conjunctivitis with eye drops. CXR with L sided infiltrate and he was started on HCAP coverage. CT chest obtained and  nodular bilateral anterior upper lobe opacities and mediastinal lymphadenopathy. He denied any chronic weight loss or night sweats. Subjective:     Tmax 100.2. Currently on RA with o2 sat 94%. Now with only minimal cough. Denies chest pain.       Current Facility-Administered Medications   Medication Dose Route Frequency    vancomycin (VANCOCIN) 1,000 mg in 0.9% sodium chloride (MBP/ADV) 250 mL  1 g IntraVENous Q12H    tobramycin (NEBCIN) 440 mg in 0.9% sodium chloride 100 mL IVPB  440 mg IntraVENous Q24H    Tobramycin random level reminder   Other ONCE    loratadine (CLARITIN) tablet 10 mg  10 mg Oral DAILY    latanoprost (XALATAN) 0.005 % ophthalmic solution 1 Drop  1 Drop Both Eyes QHS    multivitamin, stress formula (STRESS TAB) tablet 1 Tab  1 Tab Oral DAILY    cholecalciferol (VITAMIN D3) tablet 1,000 Units  1,000 Units Oral QHS    aspirin delayed-release tablet 81 mg  81 mg Oral DAILY    fluticasone (FLONASE) 50 mcg/actuation nasal spray 2 Spray  2 Spray Both Nostrils DAILY    carvedilol (COREG) tablet 6.25 mg  6.25 mg Oral BID WITH MEALS    rosuvastatin (CRESTOR) tablet 40 mg  40 mg Oral QHS    lisinopril (PRINIVIL, ZESTRIL) tablet 10 mg  10 mg Oral DAILY    spironolactone (ALDACTONE) tablet 25 mg  25 mg Oral DAILY    pantoprazole (PROTONIX) tablet 40 mg  40 mg Oral ACB    nitroglycerin (NITROSTAT) tablet 0.4 mg  0.4 mg SubLINGual Q5MIN PRN    HYDROcodone-homatropine (HYCODAN) 5-1.5 mg/5 mL (5 mL) syrup 5 mL  5 mL Oral Q6H PRN    clopidogrel (PLAVIX) tablet 75 mg  75 mg Oral DAILY    sodium chloride (NS) flush 5-10 mL  5-10 mL IntraVENous Q8H    sodium chloride (NS) flush 5-10 mL  5-10 mL IntraVENous PRN    sodium chloride (NS) flush 5-10 mL  5-10 mL IntraVENous Q8H    sodium chloride (NS) flush 5-10 mL  5-10 mL IntraVENous PRN    morphine injection 2 mg  2 mg IntraVENous Q4H PRN    acetaminophen (TYLENOL) tablet 650 mg  650 mg Oral Q4H PRN    bisacodyl (DULCOLAX) tablet 5 mg  5 mg Oral DAILY PRN    ondansetron (ZOFRAN) injection 4 mg  4 mg IntraVENous Q4H PRN    guaiFENesin-dextromethorphan (ROBITUSSIN DM) 100-10 mg/5 mL syrup 10 mL  10 mL Oral Q6H PRN    enoxaparin (LOVENOX) injection 40 mg  40 mg SubCUTAneous Q24H    albuterol (PROVENTIL VENTOLIN) nebulizer solution 2.5 mg  2.5 mg Nebulization Q6H PRN    piperacillin-tazobactam (ZOSYN) 4.5 g in 0.9% sodium chloride (MBP/ADV) 100 mL  4.5 g IntraVENous Q8H    ciprofloxacin HCl (CILOXAN) 0.3 % ophthalmic solution 1 Drop  1 Drop Both Eyes Q2H       Review of Systems  Constitutional: negative for fever, chills, sweats  Cardiovascular: negative for chest pain, palpitations, syncope, edema  Gastrointestinal:  negative for dysphagia, reflux, vomiting, diarrhea, abdominal pain, or melena  Neurologic:  negative for focal weakness, numbness, headache    Objective:     Vitals:    06/15/18 0129 06/15/18 0527 06/15/18 0759 06/15/18 0858   BP: 114/54 132/60 139/66 139/62   Pulse: 98 87 87 80   Resp: 16 16 16 16   Temp: 99 °F (37.2 °C) 100.2 °F (37.9 °C) 99 °F (37.2 °C) 99.1 °F (37.3 °C)   SpO2: 95% 96% 94% 94%   Weight:  226 lb (102.5 kg)     Height:         Intake and Output:   06/13 1901 - 06/15 0700  In: 185 [P.O.:185]  Out: 775 [Urine:775]       Physical Exam:   Constitution:  the patient is well developed and in no acute distress  EENMT:  Sclera clear, pupils equal, oral mucosa moist  Respiratory: diminished with scattered crackles posterior, RA  Cardiovascular:  RRR without M,G,R  Gastrointestinal: soft and non-tender; with positive bowel sounds. Musculoskeletal: warm without cyanosis. There is no lower leg edema. Skin:  no jaundice or rashes, no open wounds   Neurologic: no gross neuro deficits     Psychiatric:  alert and oriented x 3    CXR:   6/13 6/13      LAB  No results for input(s): GLUCPOC in the last 72 hours. No lab exists for component: [place]   Recent Labs      06/15/18   0454  06/13/18 2342 06/13/18   1745   WBC  10.1  14.0*  15.5*   HGB  11.8*  13.6  13.1*   HCT  34.0*  38.4*  37.0*   PLT  258  256  277     Recent Labs      06/15/18   0454  06/14/18 0614  06/13/18   2342  06/13/18   1745  06/12/18   1053   NA  137  134*   --   130*  132*   K  4.0  4.1   --   4.6  4.9   CL  104  103   --   95*  93*   CO2  23  20*   --   25  22   GLU  101*  159*   --   122*  142*   BUN  17  15   --   18  15   CREA  1.11  1.25   --   1.24  1.11   MG   --    --   2.1   --   2.2   CA  8.7  8.7   --   9.2  9.7   PHOS   --    --   3.0   --    --    TROIQ   --   0.68*  0.92*  0.79*   --    ALB  2.7*  3.0*   --   3.4   --    TBILI  0.4  0.7   --   0.6   --    ALT  31  29   --   29   --    SGOT  32  33   --   31   --      No results for input(s): PH, PCO2, PO2, HCO3 in the last 72 hours. No results for input(s): LCAD, LAC in the last 72 hours. Assessment:  (Medical Decision Making)     Hospital Problems  Date Reviewed: 6/15/2018          Codes Class Noted POA    Mediastinal adenopathy ICD-10-CM: R59.0  ICD-9-CM: 785.6  6/14/2018 Unknown    ? Reactive  Planning follow up imaging  Denies chronic weight loss or night sweats      Coronary artery disease involving native coronary artery of native heart (Chronic) ICD-10-CM: I25.10  ICD-9-CM: 414.01  6/13/2018 Yes     1. NSTEMI (6/4/18): Peak troponin 11. EF 40-45%. Multi-vessel PCI. mLAD:  Synergy 3.5 x16 mm AKASH.   mCirc: Synergy 3 x 20 mm AKASH. mRCA:  Resolute Gerhard 5 x 22 mm AKASH. Chest pain ICD-10-CM: R07.9  ICD-9-CM: 786.50  6/13/2018 Yes    Resolved      * (Principal)HCAP (healthcare-associated pneumonia) ICD-10-CM: J18.9  ICD-9-CM: 666  6/13/2018 Yes    On abx      Bilateral conjunctivitis ICD-10-CM: H10.9  ICD-9-CM: 372.30  6/13/2018 Yes        Dyslipidemia (Chronic) ICD-10-CM: E78.5  ICD-9-CM: 272.4  6/1/2018 Yes        Ischemic cardiomyopathy (Chronic) ICD-10-CM: I25.5  ICD-9-CM: 414.8  6/1/2018 Yes     1. LV gram:  EF 40-45% with apical hypokinesis. 2.  Echo (6/2/18):  EF 50-55%. No signnificant valve disease. Hypertension (Chronic) ICD-10-CM: I10  ICD-9-CM: 401.9  Unknown Yes     controlled w/med            Plan:  (Medical Decision Making)     --Ezekiel Clamp / Lucina Poly / Tobra 2   WBC 15.5 >>>10.1  --will need follow up outpatient imaging in 8-12 weeks    More than 50% of the time documented was spent in face-to-face contact with the patient and in the care of the patient on the floor/unit where the patient is located. Catalina Martinez NP     I have spoken with and examined the patient. I agree with the above assessment and plan as documented. Leukocytosis improving. Gen: remains on room air  Lungs: few anterior crackles on left  Heart:  RRR with no Murmur/Rubs/Gallops  Abd: +BS  Ext: trace edema    --Narrow abx to levaquin if cultures negative tomorrow and complete 7 day course. Anticipate possible discharge this weekend. --Will arrange f/u with our office with CT in 8 weeks (we will call him Monday with appointment). Advised patient to f/u with PCP earlier and to call us if there are any worsening or nonresolving symptoms. --[facility] will sign off. Call us if we can be of further assistance.     Grace Louis MD

## 2018-06-16 VITALS
TEMPERATURE: 97.4 F | HEIGHT: 72 IN | SYSTOLIC BLOOD PRESSURE: 145 MMHG | RESPIRATION RATE: 16 BRPM | BODY MASS INDEX: 30.49 KG/M2 | WEIGHT: 225.1 LBS | OXYGEN SATURATION: 96 % | DIASTOLIC BLOOD PRESSURE: 73 MMHG | HEART RATE: 93 BPM

## 2018-06-16 LAB
ALBUMIN SERPL-MCNC: 2.8 G/DL (ref 3.2–4.6)
ALBUMIN/GLOB SERPL: 0.7 {RATIO} (ref 1.2–3.5)
ALP SERPL-CCNC: 85 U/L (ref 50–136)
ALT SERPL-CCNC: 43 U/L (ref 12–65)
ANION GAP SERPL CALC-SCNC: 13 MMOL/L (ref 7–16)
AST SERPL-CCNC: 40 U/L (ref 15–37)
BACTERIA SPEC CULT: NORMAL
BILIRUB SERPL-MCNC: 0.4 MG/DL (ref 0.2–1.1)
BUN SERPL-MCNC: 12 MG/DL (ref 8–23)
CALCIUM SERPL-MCNC: 8.8 MG/DL (ref 8.3–10.4)
CHLORIDE SERPL-SCNC: 104 MMOL/L (ref 98–107)
CO2 SERPL-SCNC: 21 MMOL/L (ref 21–32)
CREAT SERPL-MCNC: 1.07 MG/DL (ref 0.8–1.5)
ERYTHROCYTE [DISTWIDTH] IN BLOOD BY AUTOMATED COUNT: 12.2 % (ref 11.9–14.6)
GLOBULIN SER CALC-MCNC: 4.3 G/DL (ref 2.3–3.5)
GLUCOSE SERPL-MCNC: 104 MG/DL (ref 65–100)
GRAM STN SPEC: NORMAL
HCT VFR BLD AUTO: 34.7 % (ref 41.1–50.3)
HGB BLD-MCNC: 12.1 G/DL (ref 13.6–17.2)
MCH RBC QN AUTO: 31.4 PG (ref 26.1–32.9)
MCHC RBC AUTO-ENTMCNC: 34.9 G/DL (ref 31.4–35)
MCV RBC AUTO: 90.1 FL (ref 79.6–97.8)
PLATELET # BLD AUTO: 261 K/UL (ref 150–450)
PMV BLD AUTO: 8.7 FL (ref 10.8–14.1)
POTASSIUM SERPL-SCNC: 3.9 MMOL/L (ref 3.5–5.1)
PROT SERPL-MCNC: 7.1 G/DL (ref 6.3–8.2)
RBC # BLD AUTO: 3.85 M/UL (ref 4.23–5.67)
SERVICE CMNT-IMP: NORMAL
SODIUM SERPL-SCNC: 138 MMOL/L (ref 136–145)
WBC # BLD AUTO: 8.9 K/UL (ref 4.3–11.1)

## 2018-06-16 PROCEDURE — 85027 COMPLETE CBC AUTOMATED: CPT | Performed by: INTERNAL MEDICINE

## 2018-06-16 PROCEDURE — 74011250636 HC RX REV CODE- 250/636: Performed by: HOSPITALIST

## 2018-06-16 PROCEDURE — 74011000258 HC RX REV CODE- 258: Performed by: HOSPITALIST

## 2018-06-16 PROCEDURE — 36415 COLL VENOUS BLD VENIPUNCTURE: CPT | Performed by: INTERNAL MEDICINE

## 2018-06-16 PROCEDURE — 74011250637 HC RX REV CODE- 250/637: Performed by: HOSPITALIST

## 2018-06-16 PROCEDURE — 80053 COMPREHEN METABOLIC PANEL: CPT | Performed by: INTERNAL MEDICINE

## 2018-06-16 RX ORDER — CIPROFLOXACIN HYDROCHLORIDE 3.5 MG/ML
1 SOLUTION/ DROPS TOPICAL 4 TIMES DAILY
Qty: 5 ML | Refills: 0 | Status: SHIPPED | OUTPATIENT
Start: 2018-06-16 | End: 2018-11-14 | Stop reason: ALTCHOICE

## 2018-06-16 RX ORDER — LEVOFLOXACIN 750 MG/1
750 TABLET ORAL DAILY
Qty: 7 TAB | Refills: 0 | Status: SHIPPED | OUTPATIENT
Start: 2018-06-16 | End: 2018-06-27

## 2018-06-16 RX ADMIN — LISINOPRIL 10 MG: 5 TABLET ORAL at 09:18

## 2018-06-16 RX ADMIN — Medication 10 ML: at 09:19

## 2018-06-16 RX ADMIN — ASPIRIN 81 MG: 81 TABLET, COATED ORAL at 09:19

## 2018-06-16 RX ADMIN — CIPROFLOXACIN HYDROCHLORIDE 1 DROP: 3 SOLUTION/ DROPS OPHTHALMIC at 05:25

## 2018-06-16 RX ADMIN — CARVEDILOL 6.25 MG: 6.25 TABLET, FILM COATED ORAL at 09:19

## 2018-06-16 RX ADMIN — CIPROFLOXACIN HYDROCHLORIDE 1 DROP: 3 SOLUTION/ DROPS OPHTHALMIC at 09:30

## 2018-06-16 RX ADMIN — Medication 10 ML: at 05:30

## 2018-06-16 RX ADMIN — PANTOPRAZOLE SODIUM 40 MG: 40 TABLET, DELAYED RELEASE ORAL at 09:19

## 2018-06-16 RX ADMIN — LORATADINE 10 MG: 10 TABLET ORAL at 09:19

## 2018-06-16 RX ADMIN — CIPROFLOXACIN HYDROCHLORIDE 1 DROP: 3 SOLUTION/ DROPS OPHTHALMIC at 03:30

## 2018-06-16 RX ADMIN — CLOPIDOGREL BISULFATE 75 MG: 75 TABLET ORAL at 09:19

## 2018-06-16 RX ADMIN — CIPROFLOXACIN HYDROCHLORIDE 1 DROP: 3 SOLUTION/ DROPS OPHTHALMIC at 01:30

## 2018-06-16 RX ADMIN — SPIRONOLACTONE 25 MG: 25 TABLET, FILM COATED ORAL at 09:19

## 2018-06-16 RX ADMIN — PIPERACILLIN SODIUM,TAZOBACTAM SODIUM 4.5 G: 4; .5 INJECTION, POWDER, FOR SOLUTION INTRAVENOUS at 05:25

## 2018-06-16 RX ADMIN — B-COMPLEX W/ C & FOLIC ACID TAB 1 TABLET: TAB at 09:18

## 2018-06-16 RX ADMIN — CIPROFLOXACIN HYDROCHLORIDE 1 DROP: 3 SOLUTION/ DROPS OPHTHALMIC at 07:00

## 2018-06-16 RX ADMIN — Medication 5 ML: at 05:30

## 2018-06-16 NOTE — DISCHARGE INSTRUCTIONS
Shortness of Breath: Care Instructions  Your Care Instructions  Shortness of breath has many causes. Sometimes conditions such as anxiety can lead to shortness of breath. Some people get mild shortness of breath when they exercise. Trouble breathing also can be a symptom of a serious problem, such as asthma, lung disease, emphysema, heart problems, and pneumonia. If your shortness of breath continues, you may need tests and treatment. Watch for any changes in your breathing and other symptoms. Follow-up care is a key part of your treatment and safety. Be sure to make and go to all appointments, and call your doctor if you are having problems. It's also a good idea to know your test results and keep a list of the medicines you take. How can you care for yourself at home? · Do not smoke or allow others to smoke around you. If you need help quitting, talk to your doctor about stop-smoking programs and medicines. These can increase your chances of quitting for good. · Get plenty of rest and sleep. · Take your medicines exactly as prescribed. Call your doctor if you think you are having a problem with your medicine. · Find healthy ways to deal with stress. ¨ Exercise daily. ¨ Get plenty of sleep. ¨ Eat regularly and well. When should you call for help? Call 911 anytime you think you may need emergency care. For example, call if:  ? · You have severe shortness of breath. ? · You have symptoms of a heart attack. These may include:  ¨ Chest pain or pressure, or a strange feeling in the chest.  ¨ Sweating. ¨ Shortness of breath. ¨ Nausea or vomiting. ¨ Pain, pressure, or a strange feeling in the back, neck, jaw, or upper belly or in one or both shoulders or arms. ¨ Lightheadedness or sudden weakness. ¨ A fast or irregular heartbeat. After you call 911, the  may tell you to chew 1 adult-strength or 2 to 4 low-dose aspirin. Wait for an ambulance. Do not try to drive yourself.    ?Call your doctor now or seek immediate medical care if:  ? · Your shortness of breath gets worse or you start to wheeze. Wheezing is a high-pitched sound when you breathe. ? · You wake up at night out of breath or have to prop your head up on several pillows to breathe. ? · You are short of breath after only light activity or while at rest.   ? Watch closely for changes in your health, and be sure to contact your doctor if:  ? · You do not get better over the next 1 to 2 days. Where can you learn more? Go to http://arsen-cam.info/. Enter S780 in the search box to learn more about \"Shortness of Breath: Care Instructions. \"  Current as of: May 12, 2017  Content Version: 11.4  © 5449-4360 [employer]. Care instructions adapted under license by [employer] (which disclaims liability or warranty for this information). If you have questions about a medical condition or this instruction, always ask your healthcare professional. [place] [address] any warranty or liability for your use of this information. Pinkeye: Care Instructions  Your Care Instructions    Pinkeye is redness and swelling of the eye surface and the conjunctiva (the lining of the eyelid and the covering of the white part of the eye). Pinkeye is also called conjunctivitis. Pinkeye is often caused by infection with bacteria or a virus. Dry air, allergies, smoke, and chemicals are other common causes. Pinkeye often clears on its own in 7 to 10 days. Antibiotics only help if the pinkeye is caused by bacteria. Pinkeye caused by infection spreads easily. If an allergy or chemical is causing pinkeye, it will not go away unless you can avoid whatever is causing it. Follow-up care is a key part of your treatment and safety. Be sure to make and go to all appointments, and call your doctor if you are having problems.  It's also a good idea to know your test results and keep a list of the medicines you take.  How can you care for yourself at home? · Wash your hands often. Always wash them before and after you treat pinkeye or touch your eyes or face. · Use moist cotton or a clean, wet cloth to remove crust. Wipe from the inside corner of the eye to the outside. Use a clean part of the cloth for each wipe. · Put cold or warm wet cloths on your eye a few times a day if the eye hurts. · Do not wear contact lenses or eye makeup until the pinkeye is gone. Throw away any eye makeup you were using when you got pinkeye. Clean your contacts and storage case. If you wear disposable contacts, use a new pair when your eye has cleared and it is safe to wear contacts again. · If the doctor gave you antibiotic ointment or eyedrops, use them as directed. Use the medicine for as long as instructed, even if your eye starts looking better soon. Keep the bottle tip clean, and do not let it touch the eye area. · To put in eyedrops or ointment:  ¨ Tilt your head back, and pull your lower eyelid down with one finger. ¨ Drop or squirt the medicine inside the lower lid. ¨ Close your eye for 30 to 60 seconds to let the drops or ointment move around. ¨ Do not touch the ointment or dropper tip to your eyelashes or any other surface. · Do not share towels, pillows, or washcloths while you have pinkeye. When should you call for help? Call your doctor now or seek immediate medical care if:  ? · You have pain in your eye, not just irritation on the surface. ? · You have a change in vision or loss of vision. ? · You have an increase in discharge from the eye.   ? · Your eye has not started to improve or begins to get worse within 48 hours after you start using antibiotics. ? · Pinkeye lasts longer than 7 days. ? Watch closely for changes in your health, and be sure to contact your doctor if you have any problems. Where can you learn more? Go to http://susan.info/.   Enter Y392 in the search box to learn more about \"Nancy: Care Instructions. \"  Current as of: March 20, 2017  Content Version: 11.4  © 1611-9433 [email or web address]. Care instructions adapted under license by [employer] (which disclaims liability or warranty for this information). If you have questions about a medical condition or this instruction, always ask your healthcare professional. [address] any warranty or liability for your use of this information. DISCHARGE SUMMARY from Nurse    PATIENT INSTRUCTIONS:    After general anesthesia or intravenous sedation, for 24 hours or while taking prescription Narcotics:  · Limit your activities  · Do not drive and operate hazardous machinery  · Do not make important personal or business decisions  · Do  not drink alcoholic beverages  · If you have not urinated within 8 hours after discharge, please contact your surgeon on call. Report the following to your surgeon:  · Excessive pain, swelling, redness or odor of or around the surgical area  · Temperature over 100.5  · Nausea and vomiting lasting longer than 4 hours or if unable to take medications  · Any signs of decreased circulation or nerve impairment to extremity: change in color, persistent  numbness, tingling, coldness or increase pain  · Any questions    What to do at Home:  Recommended activity: Activity as tolerated    *  Please give a list of your current medications to your Primary Care Provider. *  Please update this list whenever your medications are discontinued, doses are      changed, or new medications (including over-the-counter products) are added. *  Please carry medication information at all times in case of emergency situations. These are general instructions for a healthy lifestyle:    No smoking/ No tobacco products/ Avoid exposure to second hand smoke  Surgeon General's Warning:  Quitting smoking now greatly reduces serious risk to your health.     Obesity, smoking, and sedentary lifestyle greatly increases your risk for illness    A healthy diet, regular physical exercise & weight monitoring are important for maintaining a healthy lifestyle    You may be retaining fluid if you have a history of heart failure or if you experience any of the following symptoms:  Weight gain of 3 pounds or more overnight or 5 pounds in a week, increased swelling in our hands or feet or shortness of breath while lying flat in bed. Please call your doctor as soon as you notice any of these symptoms; do not wait until your next office visit. Recognize signs and symptoms of STROKE:    F-face looks uneven    A-arms unable to move or move unevenly    S-speech slurred or non-existent    T-time-call 911 as soon as signs and symptoms begin-DO NOT go       Back to bed or wait to see if you get better-TIME IS BRAIN. Warning Signs of HEART ATTACK     Call 911 if you have these symptoms:   Chest discomfort. Most heart attacks involve discomfort in the center of the chest that lasts more than a few minutes, or that goes away and comes back. It can feel like uncomfortable pressure, squeezing, fullness, or pain.  Discomfort in other areas of the upper body. Symptoms can include pain or discomfort in one or both arms, the back, neck, jaw, or stomach.  Shortness of breath with or without chest discomfort.  Other signs may include breaking out in a cold sweat, nausea, or lightheadedness. Don't wait more than five minutes to call 911 - MINUTES MATTER! Fast action can save your life. Calling 911 is almost always the fastest way to get lifesaving treatment.  Emergency Medical Services staff can begin treatment when they arrive -- up to an hour sooner than if someone gets to the hospital by car.     ___________________________________________________________________________________________________________________________________

## 2018-06-16 NOTE — PROGRESS NOTES
Problem: Falls - Risk of  Goal: *Absence of Falls  Document Bao Fall Risk and appropriate interventions in the flowsheet. Outcome: Progressing Towards Goal  Fall Risk Interventions:    Pt progressing towards goal. No falls since admission. Bed low and locked. Call light within reach. Side rails x 2. Gripper socks applied. Personal belongings within reach. Pt verbalizes understanding to call for assistance.           Medication Interventions: Teach patient to arise slowly

## 2018-06-16 NOTE — PROGRESS NOTES
Bedside and Verbal shift change report given to Shawn Dixon RN (oncoming nurse) by self Tiana jiang).  Report included the following information SBAR, Kardex, ED Summary, Procedure Summary, MAR, Recent Results and Cardiac Rhythm SR.

## 2018-06-16 NOTE — PROGRESS NOTES
Problem: Falls - Risk of  Goal: *Absence of Falls  Document Bao Fall Risk and appropriate interventions in the flowsheet. Outcome: Progressing Towards Goal  Fall Risk Interventions:            Medication Interventions: Teach patient to arise slowly     Patient progressing towards goal with no falls on current admission. Patient without confusion, agitation, or sensory perception deficits. Patient has steady gait on ambulation. Personal belongings are within reach. Bed is in the low and locked position with side rails up x2. Gripper socks to bilateral feet. Call light within reach and patient verbalizes and has demonstrated understanding of use. Problem: Pneumonia: Day 3  Goal: *Oxygen saturation within defined limits  Outcome: Resolved/Met Date Met: 06/15/18  Patient's Oxygen saturation has been 94-97% on room air. Goal: *Hemodynamically stable  Outcome: Resolved/Met Date Met: 06/15/18  Patient has been hemodynamically stable on current day with BP 130s/60s-70s and HR 70s-80s. Goal: *Optimal pain control at patient's stated goal  Outcome: Resolved/Met Date Met: 06/15/18  Patient has denied pain on current shift.

## 2018-06-16 NOTE — PROGRESS NOTES
Discharge instructions reviewed with patient and family. Prescriptions sent online to [address]. Opportunity for questions provided. Patient and family voiced understanding of all discharge instructions.

## 2018-06-18 ENCOUNTER — PATIENT OUTREACH (OUTPATIENT)
Dept: CASE MANAGEMENT | Age: 83
End: 2018-06-18

## 2018-06-18 LAB
BACTERIA SPEC CULT: NORMAL
BACTERIA SPEC CULT: NORMAL
SERVICE CMNT-IMP: NORMAL
SERVICE CMNT-IMP: NORMAL

## 2018-06-18 NOTE — PROGRESS NOTES
Transition of Care Discharge Follow-up Questionnaire   Date/Time of Call:   June 18, 2018 10:25AM   What was the patient hospitalized for? HCAP (healthcare-associated pneumonia)        Does the patient understand his/her diagnosis and/or treatment and what happened during the hospitalization? Spoke to patient who agreed to [facility] of [employer]. Patient states understanding of diagnosis and treatment plan during hospitalization. Did the patient receive discharge instructions? Yes   CM Assessed Risk for Readmission:       Patient stated Risk for Readmission:      Yes      Patient states he has no risk or readmission. Review any discharge instructions (see discharge instructions/AVS in [facility]). Ask patient if they understand these. Do they have any questions? Care Coordinator reviewed discharge medication, diet and discharge instructions with patient. Care Coordinator advised patient of the importance of reporting any S&S to PCP/Specialist.    Patient states understanding of discharge instructions, patient states no questions. Were home services ordered (nursing, PT, OT, ST, etc.)? No home health services ordered. If so, has the first visit occurred? If not, why? (Assist with coordination of services if necessary. )   NA   Was any DME ordered? No durable medical equipment ordered. If so, has it been received? If not, why?  (Assist patient in obtaining DME orders &/or equipment if necessary. ) NA         Complete a review of all medications (new, continued and discontinued meds per the D/C instructions and medication tab in [facility]). Care Coordinator reviewed all medications with patient per [facility] care who verbalized understanding. Were all new prescriptions filled?   If not, why?  (Assist patient in obtaining medications if necessary  escalate for CCM &/or SW if ongoing issues are verbalized by pt or anticipated)   No, patient states his medications are mailed/ delivered to home and should be arriving soon. Does the patient understand the purpose and dosing instructions for all medications? (If patient has questions, provide explanation and education.)   Patient states understanding of current medications and dosing instructions. Care Coordinator educated patient on the importance of medication compliance and reporting medication side effects to PCP/Specialist.      Does the patient have any problems in performing ADLs? (If patient is unable to perform ADLs  what is the limiting factor(s)? Do they have a support system that can assist? If no support system is present, discuss possible assistance that they may be able to obtain. Escalate for CCM/SW if ongoing issues are verbalized by pt or anticipated)   Patient states he is independent with ADL's and ambulation and states he is doing great and states no problems or concerns at home. Does the patient have all follow-up appointments scheduled? 7 day f/up with PCP?   (f/up with PCP may be w/in 14 days if patient has a f/up with their specialist w/in 7 days)    7-14 day f/up with specialist?   (or per discharge instructions)    If f/up has not been made  what actions has the care coordinator made to accomplish this? Has transportation been arranged? Care Coordinator educated patient on the importance of scheduling follow up appointment with PCP within 7 to 14 days of hospital discharge. Patient states he will schedule follow up appointments with PCP/Cardiologist today. Patient declined assistance from Care Coordinator. 8/28/2018 11:20 AM JESSIE Rock [facility]       Yes   Any other questions or concerns expressed by the patient? No further needs identified:  Care Coordinator provided contact information if assistance is needed. Patient instructed to call Care Coordinator if further questions or concerns arise.       Schedule next appointment with LEENA AYALA Coordinator or refer to RN Case Manager/ per the workflow guidelines. When is care coordinators next follow-up call scheduled? If referred for CCM  what RN care manager was the referral assigned? NA            NA        NA   CHITO Call Completed By: BO Jennings [facility]  Care Coordinator         This note will not be viewable in [address]

## 2018-06-18 NOTE — PROGRESS NOTES
Downtime progress note entry for [facility] :  Jayme Hayden RN    Care  Follow up Outreach Note   Outreach type:  Patient name:  :  MRN:  [id number]  Yessi Bertrand  1935  [id number]  [id number]   Date of follow up  .    18 @ 9:35am     Reason for follow-up:   Heart blockage x 3   Disease specific complaints/issues:      Patient progress towards goals set from last contact:   18 @ 9:35am- Called and left a voicemail with my contact information. 18 @ 2:11pm- Called and left a voicemail with my contact information. Has patient attended any PCP or specialist follow-up appointments since last contact? What was outcome of appointment? When is next follow-up scheduled? Review medications. Any medication changes since last outreach? Does patient have any questions or issues related to their medications? Home health active? If yes - any issue? Progress? Referrals needed?  (SW, Diabetes education, HH, etc.)    Other issues/Miscellaneous?  (Transportation, access to meals, ability to perform ADLs, adequate caregiver support, etc.)    Next Outreach Scheduled: Week of 2018     Next Steps/Goals:   Return Care  calls   Care  completing call: Jayme Hayden RN

## 2018-06-19 ENCOUNTER — PATIENT OUTREACH (OUTPATIENT)
Dept: CASE MANAGEMENT | Age: 83
End: 2018-06-19

## 2018-06-19 LAB
C-ANCA TITR SER IF: NORMAL TITER
MYELOPEROXIDASE AB SER IA-ACNC: <9 U/ML (ref 0–9)
P-ANCA ATYPICAL TITR SER IF: NORMAL TITER
P-ANCA TITR SER IF: NORMAL TITER
PROTEINASE3 AB SER IA-ACNC: <3.5 U/ML (ref 0–3.5)

## 2018-06-19 NOTE — PROGRESS NOTES
LVM with pt. Left call back # and encourage to call CC. Reached out to PCP, Pt has an appointment this Thursday with PCP. Nurse encourage PCP office to reach out to pt and remind him of his appointment. Nurse will continue to reach out to pt and provide care as needed.

## 2018-06-27 PROBLEM — I21.4 NSTEMI (NON-ST ELEVATED MYOCARDIAL INFARCTION) (HCC): Status: RESOLVED | Noted: 2018-06-01 | Resolved: 2018-06-27

## 2018-07-18 ENCOUNTER — PATIENT OUTREACH (OUTPATIENT)
Dept: CASE MANAGEMENT | Age: 83
End: 2018-07-18

## 2018-07-18 NOTE — PROGRESS NOTES
7/18/18 LVM with pt. left call back # and encourage to call CC. pt saw PCP on the 21st of June. Nurse will continue to reach out to pt to establish communication and provide care as needed.

## 2018-08-03 ENCOUNTER — PATIENT OUTREACH (OUTPATIENT)
Dept: CASE MANAGEMENT | Age: 83
End: 2018-08-03

## 2018-08-03 NOTE — PROGRESS NOTES
8/3/18 spoke with pts wife. Verbalized that pt is doing well without assist. continues to keep all Dr's appointments. saw PCP on the 21 st of July. Pt finished his course of Abx and is doing well as verbalized by wife. Wife is the POA and the primary caregiver. take all med's timely with no issues or concerns. continue to drive to all appointment. able to perform all ADL's with no assist. nurse encourage to keep all appointments. no caregiver support needed at this time. nurse will continue to monitor and provide care as needed.

## 2018-08-08 ENCOUNTER — PATIENT OUTREACH (OUTPATIENT)
Dept: CASE MANAGEMENT | Age: 83
End: 2018-08-08

## 2018-08-08 NOTE — PROGRESS NOTES
8/8/18 Spoke with pt. verbalized that he is seeing Pulmonologist at the end of Aug. pt verbalized that he is fully aware of the s/sx of PNA. nurse educate on utilizing urgent care after Dr's office closes. pt is independent of all ADL's. wife is very involved in pts care. verbalized that he drives to all appointments. no issues with meds. nurse presented with [facility] [employer] [facility] 24hr nurses line and encourage to call 911 for life threatening circumstances only. this will be nurses last outreach.

## 2018-09-07 ENCOUNTER — HOSPITAL ENCOUNTER (OUTPATIENT)
Dept: CT IMAGING | Age: 83
Discharge: HOME | End: 2018-09-07
Attending: INTERNAL MEDICINE
Payer: MEDICARE

## 2018-09-07 DIAGNOSIS — R93.89 ABNORMAL CT OF THE CHEST: ICD-10-CM

## 2018-09-07 PROCEDURE — 71250 CT THORAX DX C-: CPT

## 2018-09-13 NOTE — PROGRESS NOTES
Spoke with the patient in regards to their CT scan results, explained to the patient per Dr. Yaz Osman the CT appears to have improved and for now we will keep an eye on the RUL scarring. Patient understood and did not have any further questions or concerns at this time. // Genia DOWNS

## 2018-09-13 NOTE — PROGRESS NOTES
Please notify patient that CT Chest appears improved. Will keep an eye on RUL scarring.   Merry Acosta MD

## 2018-10-21 NOTE — DISCHARGE SUMMARY
Hospitalist Discharge Summary     Admit Date:  2018  5:45 PM   Name:  Ron Dunlap   Age:  [de-identified] y.o.  :  1935   MRN:  [id number]   PCP:  Helio Awan MD  Treatment Team: Utilization Review: Helder Agustin, RN; Consulting Provider: Aj Gatica MD; Care Manager: Mikhail Tarango, ELISE; Utilization Review: Dayday Dominguez    Problem List for this Hospitalization:  Hospital Problems as of 2018  Date Reviewed: 6/15/2018          Codes Class Noted - Resolved POA    Mediastinal adenopathy ICD-10-CM: R59.0  ICD-9-CM: 785.6  2018 - Present Unknown        Coronary artery disease involving native coronary artery of native heart (Chronic) ICD-10-CM: I25.10  ICD-9-CM: 414.01  2018 - Present Yes    Overview Addendum 2018 12:04 PM by Dixie Clark MD     1. NSTEMI (18): Peak troponin 11. EF 40-45%. Multi-vessel PCI. mLAD:  Synergy 3.5 x16 mm AKASH. mCirc:  Synergy 3 x 20 mm AKASH. mRCA:  Resolute Gerhard 5 x 22 mm AKASH. Chest pain ICD-10-CM: R07.9  ICD-9-CM: 786.50  2018 - Present Yes        * (Principal)HCAP (healthcare-associated pneumonia) ICD-10-CM: J18.9  ICD-9-CM: 486  2018 - Present Yes        Bilateral conjunctivitis ICD-10-CM: H10.9  ICD-9-CM: 372.30  2018 - Present Yes        Dyslipidemia (Chronic) ICD-10-CM: E78.5  ICD-9-CM: 272.4  2018 - Present Yes        Ischemic cardiomyopathy (Chronic) ICD-10-CM: I25.5  ICD-9-CM: 414.8  2018 - Present Yes    Overview Signed 2018 12:06 PM by Dixie Clark MD     1. LV gram:  EF 40-45% with apical hypokinesis. 2.  Echo (18):  EF 50-55%. No signnificant valve disease.               Hypertension (Chronic) ICD-10-CM: I10  ICD-9-CM: 401.9  Unknown - Present Yes    Overview Signed 10/3/2015 10:44 AM by Alize ramirez w/med                     Admission HPI from 2018:    \" Pleasant [de-identified] y/o gentleman with hx CAD, HLD, HLD recently had a NSTEMI on  and underwent heart cath with stent placement x 3 and was discharged on 6/3. Three days after discharge he developed some cough. He was seen at Cardiology office around noon today for follow-up and from cardiac standpoint was doing well. Later in the day around 3 pm developed chest discomfort while breathing that was different from the chest pain associated with his MI. He took a NTG without relief. He has had a productive cough with yellow sputum. No fever chills or rigors. He has an elevated WBC ct of 15.5. A chest xray was unremarkable but a chest CT reveals patchy bilateral airspace opacities most notable in the upper lobes. Healthcare associated pneumonia suspected and was started on IV vancomycin, zosyn, tobramycin per protocol. Will admit for further treatment. Also has noticed some thick yellow discharge from both eyes. \"    Hospital Course:  · HCAP- Placed on Vancomycin, Tobramycin and Zosyn, Cultures remained negative, BC negative to date, sputum negative, leukocytosis resolved and afebrile. D/c on a course of Levaquin with close follow ups. · Lymphadenopathy: will have follow up imaging in 8-10 weeks with pulmonary. Pt and family report understanding. · CAD s/p recent PCI with 3 stents- continue ASA, Plavix, Trop trending down from prior admit. No invasive cardiac procedures. planned for now. · Ischemic Cardiomyopathy- c/w ACEI, BB, Aldactone. · HLD- statin  · HTN- continue current meds  · Bilateral Conjunctivitis- Cipro eye drops. Follow up instructions below. Plan was discussed with patient and family at bedside. All questions answered. Patient was stable at time of discharge and was instructed to call or return if there are any concerns or recurrence of symptoms. Diagnostic Imaging/Tests:   Xr Chest Pa Lat    Result Date: 6/13/2018  Frontal and lateral views of the chest Comparison: 06/01/2018 Indication: chest pain FINDINGS: The cardiac and mediastinal contours are within normal limits. There is no focal pulmonary infiltrate, nodule, effusion, or pneumothorax. No discrete acute osseous lesion seen. IMPRESSION: No acute cardiopulmonary process. Ct Chest W Cont    Result Date: 6/13/2018  CT chest with contrast (pulmonary embolism protocol): 06/13/2018 History: New onset chest pain today, stent placement 1 week ago. Technique: Helically acquired images were obtained from the lung apices to the domes of the diaphragms reconstructed at 2.5mm intervals after the uneventful administration of 100 cc's intravenous Isovue-370 in order to evaluate the pulmonary arteries. Coronal reformatted images were submitted. Radiation dose reduction techniques were used for this study:  Our CT scanners use one or all of the following: Automated exposure control, adjustment of the mA and/or kVp according to patient's size, iterative reconstruction. Comparison: None Findings: There is no pleural or pericardial effusion. The heart and great vessels are unremarkable. There are patchy airspace opacities within the bilateral upper lobes anteromedially. There are similar but less impressive nodular airspace opacities elsewhere within the right upper and bilateral lower lobes. There are several mildly prominent mediastinal and hilar lymph nodes. For example a left hilar lymph node measuring 1.4 cm with a low density component. Right lower paratracheal lymph node measures up to 1 cm in short access. There is a small hiatal hernia. IMPRESSION: 1. No evidence of pulmonary embolism. 2. Patchy bilateral airspace opacities most notable within the upper lobes. 2. Scattered mildly enlarged lymph nodes. These findings could be reactive although additional etiologies including metastatic disease or lymphoma can have similar imaging features. Echocardiogram results:  No results found for this visit on 06/13/18.       All Micro Results     Procedure Component Value Units Date/Time    CULTURE, RESPIRATORY/SPUTUM/BRONCH W Jorge Luis Alan [[id number]] Collected:  06/14/18 1324    Order Status:  Completed Specimen:  Sputum from Sputum Updated:  06/16/18 0848     Special Requests: NO SPECIAL REQUESTS        GRAM STAIN RARE GRAM POSITIVE COCCI         RARE GRAM NEGATIVE COCCI         FEW YEAST         50 TO 75 WBC'S PER OIF      0 TO 1 EPITHELIAL CELLS PER OIF      TRACE MUCUS     Culture result:         MODERATE NORMAL RESPIRATORY EDU    CULTURE, BLOOD [[id number]] Collected:  06/13/18 1844    Order Status:  Completed Specimen:  Blood from Blood Updated:  06/16/18 0603     Special Requests: --        LEFT  Antecubital       Culture result: NO GROWTH 3 DAYS       CULTURE, BLOOD [[id number]] Collected:  06/13/18 1920    Order Status:  Completed Specimen:  Blood from Blood Updated:  06/16/18 0603     Special Requests: RIGHT FOREARM        Culture result: NO GROWTH 3 DAYS             Labs: Results:       BMP, Mg, Phos Recent Labs      06/16/18   0439  06/15/18   0454  06/14/18   0614  06/13/18   2342   NA  138  137  134*   --    K  3.9  4.0  4.1   --    CL  104  104  103   --    CO2  21  23  20*   --    AGAP  13  10  11   --    BUN  12  17  15   --    CREA  1.07  1.11  1.25   --    CA  8.8  8.7  8.7   --    GLU  104*  101*  159*   --    MG   --    --    --   2.1   PHOS   --    --    --   3.0      CBC Recent Labs      06/16/18   0439  06/15/18   0454  06/13/18   2342  06/13/18   1745   WBC  8.9  10.1  14.0*  15.5*   RBC  3.85*  3.78*  4.24  4.13*   HGB  12.1*  11.8*  13.6  13.1*   HCT  34.7*  34.0*  38.4*  37.0*   PLT  261  258  256  277   GRANS   --    --   76  77   LYMPH   --    --   15  14   EOS   --    --   1  1   MONOS   --    --   8  8   BASOS   --    --   0  0   IG   --    --   0  1   ANEU   --    --   10.5*  11.9*   ABL   --    --   2.2  2.2   NINI   --    --   0.1  0.2   ABM   --    --   1.2  1.2   ABB   --    --   0.0  0.0   AIG   --    --   0.1  0.2      LFT Recent Labs      06/16/18   0439  06/15/18   0454  06/14/18   0614   SGOT  40* 32  33   ALT  43  31  29   AP  85  90  90   TP  7.1  7.0  7.3   ALB  2.8*  2.7*  3.0*   GLOB  4.3*  4.3*  4.3*   AGRAT  0.7*  0.6*  0.7*      Cardiac Testing Lab Results   Component Value Date/Time    BNP 23 06/01/2018 01:57 PM     (H) 06/13/2018 11:42 PM    Troponin-I, Qt. 0.68 (HH) 06/14/2018 06:14 AM    Troponin-I, Qt. 0.92 (HH) 06/13/2018 11:42 PM    Troponin-I, Qt. 0.79 (HH) 06/13/2018 05:45 PM      Coagulation Tests Lab Results   Component Value Date/Time    Prothrombin time 11.4 01/08/2016 04:09 AM    Prothrombin time 10.7 01/07/2016 03:54 AM    Prothrombin time 10.7 01/06/2016 05:47 PM    INR 1.1 01/08/2016 04:09 AM    INR 1.0 01/07/2016 03:54 AM    INR 1.0 01/06/2016 05:47 PM    aPTT 27.3 12/30/2015 12:24 PM    aPTT 27.1 03/24/2015 10:53 AM      A1c Lab Results   Component Value Date/Time    Hemoglobin A1c 5.6 05/09/2018 11:33 AM    Hemoglobin A1c 5.9 (H) 11/09/2017 11:33 AM    Hemoglobin A1c 6.2 (H) 04/20/2017 11:43 AM    Hemoglobin A1c, External 6.2 03/30/2015      Lipid Panel Lab Results   Component Value Date/Time    Cholesterol, total 78 06/14/2018 06:14 AM    HDL Cholesterol 28 (L) 06/14/2018 06:14 AM    LDL, calculated 28.8 06/14/2018 06:14 AM    VLDL, calculated 21.2 06/14/2018 06:14 AM    Triglyceride 106 06/14/2018 06:14 AM    CHOL/HDL Ratio 2.8 06/14/2018 06:14 AM      Thyroid Panel Lab Results   Component Value Date/Time    TSH 2.700 10/08/2015 11:06 AM        Most Recent UA Lab Results   Component Value Date/Time    Color YELLOW 12/30/2015 01:00 PM    Appearance CLEAR 12/30/2015 01:00 PM    Specific gravity 1.009 12/30/2015 01:00 PM    pH (UA) 6.5 12/30/2015 01:00 PM    Protein NEGATIVE  12/30/2015 01:00 PM    Glucose NEGATIVE  12/30/2015 01:00 PM    Ketone NEGATIVE  12/30/2015 01:00 PM    Bilirubin NEGATIVE  12/30/2015 01:00 PM    Blood TRACE (A) 12/30/2015 01:00 PM    Urobilinogen 0.2 12/30/2015 01:00 PM    Nitrites NEGATIVE  12/30/2015 01:00 PM    Leukocyte Esterase NEGATIVE 12/30/2015 01:00 PM        Allergies   Allergen Reactions    Meloxicam Itching     Immunization History   Administered Date(s) Administered    Influenza High Dose Vaccine PF 10/08/2015, 10/20/2016, 09/06/2017    Influenza Vaccine 09/18/2013, 09/29/2014    Pneumococcal Conjugate (PCV-13) 03/30/2015    Pneumococcal Polysaccharide (PPSV-23) 09/18/2013    TB Skin Test (PPD) Intradermal 04/15/2015, 01/06/2016       All Labs from Last 24 Hrs:  Recent Results (from the past 24 hour(s))   VANCOMYCIN, TROUGH    Collection Time: 06/15/18  9:10 PM   Result Value Ref Range    Vancomycin,trough 12.0 5 - 20 ug/mL   CBC W/O DIFF    Collection Time: 06/16/18  4:39 AM   Result Value Ref Range    WBC 8.9 4.3 - 11.1 K/uL    RBC 3.85 (L) 4.23 - 5.67 M/uL    HGB 12.1 (L) 13.6 - 17.2 g/dL    HCT 34.7 (L) 41.1 - 50.3 %    MCV 90.1 79.6 - 97.8 FL    MCH 31.4 26.1 - 32.9 PG    MCHC 34.9 31.4 - 35.0 g/dL    RDW 12.2 11.9 - 14.6 %    PLATELET 897 966 - 459 K/uL    MPV 8.7 (L) 10.8 - 77.4 FL   METABOLIC PANEL, COMPREHENSIVE    Collection Time: 06/16/18  4:39 AM   Result Value Ref Range    Sodium 138 136 - 145 mmol/L    Potassium 3.9 3.5 - 5.1 mmol/L    Chloride 104 98 - 107 mmol/L    CO2 21 21 - 32 mmol/L    Anion gap 13 7 - 16 mmol/L    Glucose 104 (H) 65 - 100 mg/dL    BUN 12 8 - 23 MG/DL    Creatinine 1.07 0.8 - 1.5 MG/DL    GFR est AA >60 >60 ml/min/1.73m2    GFR est non-AA >60 >60 ml/min/1.73m2    Calcium 8.8 8.3 - 10.4 MG/DL    Bilirubin, total 0.4 0.2 - 1.1 MG/DL    ALT (SGPT) 43 12 - 65 U/L    AST (SGOT) 40 (H) 15 - 37 U/L    Alk.  phosphatase 85 50 - 136 U/L    Protein, total 7.1 6.3 - 8.2 g/dL    Albumin 2.8 (L) 3.2 - 4.6 g/dL    Globulin 4.3 (H) 2.3 - 3.5 g/dL    A-G Ratio 0.7 (L) 1.2 - 3.5         Discharge Exam:  Patient Vitals for the past 24 hrs:   Temp Pulse Resp BP SpO2   06/16/18 0917 97.4 °F (36.3 °C) 93 16 145/73 96 %   06/16/18 0442 98.9 °F (37.2 °C) 79 16 116/60 94 %   06/16/18 0038 98.9 °F (37.2 °C) 82 16 137/66 95 %   06/15/18 2022 98.5 °F (36.9 °C) 74 14 137/70 97 %   06/15/18 1740 98.2 °F (36.8 °C) 89 17 135/78 96 %   06/15/18 1323 98.7 °F (37.1 °C) 85 17 138/64 97 %     Oxygen Therapy  O2 Sat (%): 96 % (06/16/18 0917)  O2 Device: Room air (06/16/18 0725)    Intake/Output Summary (Last 24 hours) at 06/16/18 1257  Last data filed at 06/16/18 0038   Gross per 24 hour   Intake              100 ml   Output              580 ml   Net             -480 ml        Physical exam:  General:                    No acute distress     Eyes:                                   Bilateral eyes slightly erythematous  Lungs:                                 Minimal bilateral crackles  Heart:                                  Regular rate and rhythm,  No murmur, rub, or gallop  Abdomen:                  Soft, Non distended, Non tender, Positive bowel sounds  Extremities:               No cyanosis, clubbing or edema  Neurologic:                No focal deficits    Discharge Info:   Discharge Medication List as of 6/16/2018 10:48 AM      START taking these medications    Details   ciprofloxacin HCl (CILOXAN) 0.3 % ophthalmic solution Administer 1 Drop to both eyes four (4) times daily. , Normal, Disp-5 mL, R-0      levoFLOXacin (LEVAQUIN) 750 mg tablet Take 1 Tab by mouth daily. , Normal, Disp-7 Tab, R-0         CONTINUE these medications which have NOT CHANGED    Details   carvedilol (COREG) 6.25 mg tablet Take 1 Tab by mouth two (2) times daily (with meals). , Print, Disp-60 Tab, R-11      rosuvastatin (CRESTOR) 40 mg tablet Take 1 Tab by mouth nightly. , Print, Disp-30 Tab, R-11      lisinopril (PRINIVIL, ZESTRIL) 10 mg tablet Take 1 Tab by mouth daily. , Print, Disp-30 Tab, R-11      spironolactone (ALDACTONE) 25 mg tablet Take 1 Tab by mouth daily. , Print, Disp-30 Tab, R-11      pantoprazole (PROTONIX) 40 mg tablet Take 1 Tab by mouth Daily (before breakfast). , Print, Disp-30 Tab, R-11      nitroglycerin (NITROSTAT) 0.4 mg SL tablet 1 Tab by SubLINGual route every five (5) minutes as needed for Chest Pain. Up to 3 doses. , Print, Disp-1 Bottle, R-5      aspirin delayed-release 81 mg tablet Take  by mouth nightly., Historical Med      fluticasone (FLONASE) 50 mcg/actuation nasal spray USE 2 SPRAYS IN BOTH NOSTRILS DAILY., Normal, Disp-48 g, R-3      OMEGA-3 FATTY ACIDS (FISH OIL CONCENTRATE PO) Take  by mouth., Historical Med      cholecalciferol (VITAMIN D3) 1,000 unit tablet Take 1,000 Units by mouth nightly., Historical Med      MULTIVITAMIN W-MINERALS/LUTEIN (MULTIVITAMIN 50 PLUS PO) Take 1 Tab by mouth daily. , Historical Med      latanoprost (XALATAN) 0.005 % ophthalmic solution Administer 1 Drop to both eyes nightly., Historical Med      b complex vitamins tablet Take 1 Tab by mouth nightly., Historical Med      fexofenadine (ALLEGRA) 180 mg tablet Take 180 mg by mouth daily. Per anesthesia protocol:instructed to take am of surgery. Indications: ALLERGIC RHINITIS, Historical Med      BETA-CAROTENE,A, W-C & E/ZN/CU (VISION-LAMONT + ZINC PO) Take 1 Tab by mouth daily. , Historical Med      ticagrelor (BRILINTA) 90 mg tablet Take 90 mg by mouth two (2) times a day., Historical Med      clopidogrel (PLAVIX) 75 mg tab Take 1 Tab by mouth daily. , Normal, Disp-90 Tab, R-3      HYDROcodone-homatropine (HYCODAN) 5-1.5 mg/5 mL (5 mL) syrup Take 5 mL by mouth four (4) times daily as needed.  Max Daily Amount: 20 mL., Print, Disp-120 mL, R-0               Disposition: home    Activity: Activity as tolerated  Diet: DIET CARDIAC Regular    Follow-up Information     Follow up With Details Comments Contact Info    Masood Beck MD Schedule an appointment as soon as possible for a visit in 9 days  [id number] [address] Donaldo Dang MD Schedule an appointment as soon as possible for a visit in 9 days  [address] [facility] [facility] [address]      Steven Funez MD Schedule an appointment as soon as possible for a visit in 8 weeks  [address]  [phone]              Time spent in patient discharge planning and coordination 47 minutes. Signed:   Juan C Valenzuela MD flank pain

## 2018-11-14 PROBLEM — J18.9 HCAP (HEALTHCARE-ASSOCIATED PNEUMONIA): Status: RESOLVED | Noted: 2018-06-13 | Resolved: 2018-11-14

## 2018-11-14 PROBLEM — I25.5 ISCHEMIC CARDIOMYOPATHY: Chronic | Status: RESOLVED | Noted: 2018-06-01 | Resolved: 2018-11-14

## 2018-11-14 PROBLEM — R07.9 CHEST PAIN: Status: RESOLVED | Noted: 2018-06-13 | Resolved: 2018-11-14

## 2018-11-14 PROBLEM — H10.9 BILATERAL CONJUNCTIVITIS: Status: RESOLVED | Noted: 2018-06-13 | Resolved: 2018-11-14

## 2019-05-13 PROBLEM — E66.9 OBESITY: Status: ACTIVE | Noted: 2019-05-13

## 2019-06-12 PROBLEM — E87.5 HYPERKALEMIA: Status: ACTIVE | Noted: 2019-06-12

## 2021-06-01 PROBLEM — M21.372 LEFT FOOT DROP: Status: ACTIVE | Noted: 2021-06-01

## 2021-06-01 PROBLEM — M51.26 HNP (HERNIATED NUCLEUS PULPOSUS), LUMBAR: Status: ACTIVE | Noted: 2021-06-01

## 2021-06-09 ENCOUNTER — HOSPITAL ENCOUNTER (OUTPATIENT)
Dept: SURGERY | Age: 86
Discharge: HOME | End: 2021-06-09
Payer: MEDICARE

## 2021-06-09 VITALS
BODY MASS INDEX: 30.58 KG/M2 | RESPIRATION RATE: 16 BRPM | HEART RATE: 78 BPM | DIASTOLIC BLOOD PRESSURE: 82 MMHG | OXYGEN SATURATION: 97 % | SYSTOLIC BLOOD PRESSURE: 153 MMHG | HEIGHT: 72 IN | TEMPERATURE: 97.3 F | WEIGHT: 225.8 LBS

## 2021-06-09 LAB
BACTERIA SPEC CULT: ABNORMAL
CREAT SERPL-MCNC: 0.8 MG/DL (ref 0.8–1.5)
POTASSIUM SERPL-SCNC: 3.9 MMOL/L (ref 3.5–5.1)
SERVICE CMNT-IMP: ABNORMAL

## 2021-06-09 PROCEDURE — 87641 MR-STAPH DNA AMP PROBE: CPT

## 2021-06-09 PROCEDURE — 82565 ASSAY OF CREATININE: CPT

## 2021-06-09 PROCEDURE — 84132 ASSAY OF SERUM POTASSIUM: CPT

## 2021-06-09 RX ORDER — ACETAMINOPHEN 500 MG
1000 TABLET ORAL
COMMUNITY

## 2021-06-09 RX ORDER — HYDROCODONE BITARTRATE AND ACETAMINOPHEN 5; 325 MG/1; MG/1
TABLET ORAL AS NEEDED
COMMUNITY
Start: 2021-04-21 | End: 2021-08-12 | Stop reason: ALTCHOICE

## 2021-06-09 NOTE — PERIOP NOTES
Recent Results (from the past 12 hour(s))   POTASSIUM    Collection Time: 06/09/21  2:34 PM   Result Value Ref Range    Potassium 3.9 3.5 - 5.1 mmol/L   CREATININE    Collection Time: 06/09/21  2:34 PM   Result Value Ref Range    Creatinine 0.80 0.8 - 1.5 MG/DL     Reviewed, within defined limits of anesthesia protocol.     Chart flagged for charge RN:  MRSA/MSSA in process

## 2021-06-09 NOTE — PERIOP NOTES
Patient verified name & . Order to obtain consent  found in EHR  and matches case posting. TYPE  CASE :2              Orders:  received  Labs per Spine protocol:  MRSA/MSSA   Labs per anesthesia protocol: K+, Creatinine  EK21 per office note  sinus rhythm, normal axis, normal intervals. Echo: 19  EF 59.6%  Glucose: not indciated    Medication bottles  visualized today. Medication instructions provided verbally and through PTA medication instruction handout. Handout reviewed with patient and a copy provided. Patient verbalizes understanding of instructions. Per patient, he has norco but he does not take, takes only tylenol. Patient has received both moderna vaccines, card scanned under media    Pt was provided with antibacterial soap, Hibiclens. Handouts and all Surgery instructions provided to pt and pt verbalizes understanding. Patient Guide to Surgery Packet provided to patient. Packet includes Patient Guide to surgery handout, Facts about Pain Management handout, Hand Hygiene handout, Patient Education and Teaching Sheet -Transfusion of Blood and Blood Products handout, and  [facility] frequent question and answer sheet. Guide reviewed with the patient and all questions answered to the satisfaction of the patient. Patient instructed on the following and verbalized understanding:  Arrive at main entrance, time of arrival to be called the day before by 1700. Responsible adult must drive patient to and from hospital, and patient must have responsible adult present for 24 hours postoperatively. Npo after midnight including gum, mints and ice chips. Shower using hibiclens the night before and the morning of surgery. Hibiclens provided to the patient with handout and verbal instructions for use. Leave all valuables at home. Instructed on no jewelry or body piercings on the dos. Bring insurance card and ID.   No perfumes, oil, powder, colognes, makeup or lotions on the skin. Patient verbalized understanding of all instructions and provided all medical/health information to the best of their ability.

## 2021-06-09 NOTE — PERIOP NOTES
PLEASE CONTINUE TAKING ALL PRESCRIPTION MEDICATIONS UP TO THE DAY OF SURGERY UNLESS OTHERWISE DIRECTED BELOW. DISCONTINUE all vitamins and supplements 7 days prior to surgery. DISCONTINUE Non-Steriodal Anti-Inflammatory (NSAIDS) such as Advil and Aleve 5 days prior to surgery. Home Medications to take  the day of surgery    carvedilol   Fexofenadine-allegra   flonase nasal spray         Home Medications   to Hold   Vitamins, Supplements, and Herbals. Non-Steriodal Anti-Inflammatory (NSAIDS) such as Advil and Aleve. Hold the morning of surgery: lisinopril-hydrochlorothiazide        Comments    Bring nitroglycerin    On the day before surgery please take Acetaminophen 1000mg in the morning and then again before bed. You may substitute for Tylenol 650 mg. Please do not bring home medications with you on the day of surgery unless otherwise directed by your nurse. If you are instructed to bring home medications, please give them to your nurse as they will be administered by the nursing staff. If you have any questions, please call [facility] [phone] or [facility] [phone]. A copy of this note was provided to the patient for reference.

## 2021-06-15 ENCOUNTER — ANESTHESIA EVENT (OUTPATIENT)
Dept: SURGERY | Age: 86
End: 2021-06-15
Payer: MEDICARE

## 2021-06-16 ENCOUNTER — ANESTHESIA (OUTPATIENT)
Dept: SURGERY | Age: 86
End: 2021-06-16
Payer: MEDICARE

## 2021-06-16 ENCOUNTER — APPOINTMENT (OUTPATIENT)
Dept: GENERAL RADIOLOGY | Age: 86
End: 2021-06-16
Attending: NEUROLOGICAL SURGERY
Payer: MEDICARE

## 2021-06-16 ENCOUNTER — HOSPITAL ENCOUNTER (OUTPATIENT)
Age: 86
Setting detail: OUTPATIENT SURGERY
Discharge: HOME | End: 2021-06-16
Attending: NEUROLOGICAL SURGERY | Admitting: NEUROLOGICAL SURGERY
Payer: MEDICARE

## 2021-06-16 VITALS
OXYGEN SATURATION: 93 % | HEIGHT: 72 IN | HEART RATE: 75 BPM | TEMPERATURE: 98.5 F | SYSTOLIC BLOOD PRESSURE: 157 MMHG | RESPIRATION RATE: 16 BRPM | BODY MASS INDEX: 30.07 KG/M2 | DIASTOLIC BLOOD PRESSURE: 74 MMHG | WEIGHT: 222 LBS

## 2021-06-16 DIAGNOSIS — M48.062 LUMBAR STENOSIS WITH NEUROGENIC CLAUDICATION: Primary | ICD-10-CM

## 2021-06-16 LAB
GLUCOSE BLD STRIP.AUTO-MCNC: 121 MG/DL (ref 65–100)
SERVICE CMNT-IMP: ABNORMAL

## 2021-06-16 PROCEDURE — 76010000173 HC OR TIME 3 TO 3.5 HR INTENSV-TIER 1: Performed by: NEUROLOGICAL SURGERY

## 2021-06-16 PROCEDURE — 74011250636 HC RX REV CODE- 250/636: Performed by: ANESTHESIOLOGY

## 2021-06-16 PROCEDURE — 77030018390 HC SPNG HEMSTAT2 J&J -B: Performed by: NEUROLOGICAL SURGERY

## 2021-06-16 PROCEDURE — 77030019557 HC ELECTRD VES SEAL MEDT -F: Performed by: NEUROLOGICAL SURGERY

## 2021-06-16 PROCEDURE — 76210000021 HC REC RM PH II 0.5 TO 1 HR: Performed by: NEUROLOGICAL SURGERY

## 2021-06-16 PROCEDURE — 74011000250 HC RX REV CODE- 250: Performed by: ANESTHESIOLOGY

## 2021-06-16 PROCEDURE — 74011000250 HC RX REV CODE- 250: Performed by: NEUROLOGICAL SURGERY

## 2021-06-16 PROCEDURE — 2709999900 HC NON-CHARGEABLE SUPPLY: Performed by: NEUROLOGICAL SURGERY

## 2021-06-16 PROCEDURE — 74011250636 HC RX REV CODE- 250/636: Performed by: NEUROLOGICAL SURGERY

## 2021-06-16 PROCEDURE — 77030020269 HC MISC IMPL: Performed by: NEUROLOGICAL SURGERY

## 2021-06-16 PROCEDURE — 77030019908 HC STETH ESOPH SIMS -A: Performed by: ANESTHESIOLOGY

## 2021-06-16 PROCEDURE — 74011000250 HC RX REV CODE- 250: Performed by: NURSE ANESTHETIST, CERTIFIED REGISTERED

## 2021-06-16 PROCEDURE — 74011250637 HC RX REV CODE- 250/637: Performed by: NEUROLOGICAL SURGERY

## 2021-06-16 PROCEDURE — 76060000037 HC ANESTHESIA 3 TO 3.5 HR: Performed by: NEUROLOGICAL SURGERY

## 2021-06-16 PROCEDURE — 77030002916 HC SUT ETHLN J&J -A: Performed by: NEUROLOGICAL SURGERY

## 2021-06-16 PROCEDURE — 77030040922 HC BLNKT HYPOTHRM STRY -A: Performed by: ANESTHESIOLOGY

## 2021-06-16 PROCEDURE — 63047 LAM FACETEC & FORAMOT LUMBAR: CPT | Performed by: NEUROLOGICAL SURGERY

## 2021-06-16 PROCEDURE — 74011250636 HC RX REV CODE- 250/636: Performed by: NURSE ANESTHETIST, CERTIFIED REGISTERED

## 2021-06-16 PROCEDURE — 74011000272 HC RX REV CODE- 272: Performed by: NEUROLOGICAL SURGERY

## 2021-06-16 PROCEDURE — 74011250637 HC RX REV CODE- 250/637: Performed by: ANESTHESIOLOGY

## 2021-06-16 PROCEDURE — 63048 LAM FACETEC &FORAMOT EA ADDL: CPT | Performed by: NEUROLOGICAL SURGERY

## 2021-06-16 PROCEDURE — 77030028270 HC SRGFL HEMSTAT MTRX J&J -C: Performed by: NEUROLOGICAL SURGERY

## 2021-06-16 PROCEDURE — 77030039425 HC BLD LARYNG TRULITE DISP TELE -A: Performed by: ANESTHESIOLOGY

## 2021-06-16 PROCEDURE — 77030012935 HC DRSG AQUACEL BMS -B: Performed by: NEUROLOGICAL SURGERY

## 2021-06-16 PROCEDURE — 82962 GLUCOSE BLOOD TEST: CPT

## 2021-06-16 PROCEDURE — 77030012894: Performed by: NEUROLOGICAL SURGERY

## 2021-06-16 PROCEDURE — 77030037088 HC TUBE ENDOTRACH ORAL NSL COVD-A: Performed by: ANESTHESIOLOGY

## 2021-06-16 PROCEDURE — 76210000006 HC OR PH I REC 0.5 TO 1 HR: Performed by: NEUROLOGICAL SURGERY

## 2021-06-16 PROCEDURE — 72020 X-RAY EXAM OF SPINE 1 VIEW: CPT

## 2021-06-16 PROCEDURE — 77030014007 HC SPNG HEMSTAT J&J -B: Performed by: NEUROLOGICAL SURGERY

## 2021-06-16 PROCEDURE — 77030003029 HC SUT VCRL J&J -B: Performed by: NEUROLOGICAL SURGERY

## 2021-06-16 RX ORDER — FAMOTIDINE 20 MG/1
20 TABLET, FILM COATED ORAL ONCE
Status: COMPLETED | OUTPATIENT
Start: 2021-06-16 | End: 2021-06-16

## 2021-06-16 RX ORDER — ONDANSETRON 2 MG/ML
INJECTION INTRAMUSCULAR; INTRAVENOUS AS NEEDED
Status: DISCONTINUED | OUTPATIENT
Start: 2021-06-16 | End: 2021-06-16 | Stop reason: HOSPADM

## 2021-06-16 RX ORDER — SODIUM CHLORIDE 0.9 % (FLUSH) 0.9 %
5-40 SYRINGE (ML) INJECTION AS NEEDED
Status: DISCONTINUED | OUTPATIENT
Start: 2021-06-16 | End: 2021-06-16 | Stop reason: HOSPADM

## 2021-06-16 RX ORDER — OXYCODONE HYDROCHLORIDE 5 MG/1
5 TABLET ORAL
Status: DISCONTINUED | OUTPATIENT
Start: 2021-06-16 | End: 2021-06-16 | Stop reason: HOSPADM

## 2021-06-16 RX ORDER — SODIUM CHLORIDE, SODIUM LACTATE, POTASSIUM CHLORIDE, CALCIUM CHLORIDE 600; 310; 30; 20 MG/100ML; MG/100ML; MG/100ML; MG/100ML
100 INJECTION, SOLUTION INTRAVENOUS CONTINUOUS
Status: DISCONTINUED | OUTPATIENT
Start: 2021-06-16 | End: 2021-06-16 | Stop reason: HOSPADM

## 2021-06-16 RX ORDER — HYDROMORPHONE HYDROCHLORIDE 1 MG/ML
0.5 INJECTION, SOLUTION INTRAMUSCULAR; INTRAVENOUS; SUBCUTANEOUS
Status: DISCONTINUED | OUTPATIENT
Start: 2021-06-16 | End: 2021-06-16 | Stop reason: HOSPADM

## 2021-06-16 RX ORDER — MIDAZOLAM HYDROCHLORIDE 1 MG/ML
2 INJECTION, SOLUTION INTRAMUSCULAR; INTRAVENOUS
Status: DISCONTINUED | OUTPATIENT
Start: 2021-06-16 | End: 2021-06-16 | Stop reason: HOSPADM

## 2021-06-16 RX ORDER — DEXAMETHASONE SODIUM PHOSPHATE 4 MG/ML
INJECTION, SOLUTION INTRA-ARTICULAR; INTRALESIONAL; INTRAMUSCULAR; INTRAVENOUS; SOFT TISSUE AS NEEDED
Status: DISCONTINUED | OUTPATIENT
Start: 2021-06-16 | End: 2021-06-16 | Stop reason: HOSPADM

## 2021-06-16 RX ORDER — SODIUM CHLORIDE 0.9 % (FLUSH) 0.9 %
5-40 SYRINGE (ML) INJECTION EVERY 8 HOURS
Status: DISCONTINUED | OUTPATIENT
Start: 2021-06-16 | End: 2021-06-16 | Stop reason: HOSPADM

## 2021-06-16 RX ORDER — BUPIVACAINE HYDROCHLORIDE AND EPINEPHRINE 5; 5 MG/ML; UG/ML
INJECTION, SOLUTION EPIDURAL; INTRACAUDAL; PERINEURAL AS NEEDED
Status: DISCONTINUED | OUTPATIENT
Start: 2021-06-16 | End: 2021-06-16 | Stop reason: HOSPADM

## 2021-06-16 RX ORDER — LIDOCAINE HYDROCHLORIDE 10 MG/ML
0.1 INJECTION INFILTRATION; PERINEURAL AS NEEDED
Status: DISCONTINUED | OUTPATIENT
Start: 2021-06-16 | End: 2021-06-16 | Stop reason: HOSPADM

## 2021-06-16 RX ORDER — NALOXONE HYDROCHLORIDE 0.4 MG/ML
0.1 INJECTION, SOLUTION INTRAMUSCULAR; INTRAVENOUS; SUBCUTANEOUS AS NEEDED
Status: DISCONTINUED | OUTPATIENT
Start: 2021-06-16 | End: 2021-06-16 | Stop reason: HOSPADM

## 2021-06-16 RX ORDER — OXYCODONE AND ACETAMINOPHEN 7.5; 325 MG/1; MG/1
1 TABLET ORAL
Qty: 21 TABLET | Refills: 0 | Status: SHIPPED | OUTPATIENT
Start: 2021-06-16 | End: 2021-06-23

## 2021-06-16 RX ORDER — FENTANYL CITRATE 50 UG/ML
100 INJECTION, SOLUTION INTRAMUSCULAR; INTRAVENOUS ONCE
Status: DISCONTINUED | OUTPATIENT
Start: 2021-06-16 | End: 2021-06-16 | Stop reason: HOSPADM

## 2021-06-16 RX ORDER — LIDOCAINE HYDROCHLORIDE 20 MG/ML
INJECTION, SOLUTION EPIDURAL; INFILTRATION; INTRACAUDAL; PERINEURAL AS NEEDED
Status: DISCONTINUED | OUTPATIENT
Start: 2021-06-16 | End: 2021-06-16 | Stop reason: HOSPADM

## 2021-06-16 RX ORDER — NEOSTIGMINE METHYLSULFATE 1 MG/ML
INJECTION, SOLUTION INTRAVENOUS AS NEEDED
Status: DISCONTINUED | OUTPATIENT
Start: 2021-06-16 | End: 2021-06-16 | Stop reason: HOSPADM

## 2021-06-16 RX ORDER — HYDROCODONE BITARTRATE AND ACETAMINOPHEN 7.5; 325 MG/1; MG/1
1 TABLET ORAL AS NEEDED
Status: DISCONTINUED | OUTPATIENT
Start: 2021-06-16 | End: 2021-06-16 | Stop reason: HOSPADM

## 2021-06-16 RX ORDER — CEFAZOLIN SODIUM/WATER 2 G/20 ML
2 SYRINGE (ML) INTRAVENOUS ONCE
Status: COMPLETED | OUTPATIENT
Start: 2021-06-16 | End: 2021-06-16

## 2021-06-16 RX ORDER — ROCURONIUM BROMIDE 10 MG/ML
INJECTION, SOLUTION INTRAVENOUS AS NEEDED
Status: DISCONTINUED | OUTPATIENT
Start: 2021-06-16 | End: 2021-06-16 | Stop reason: HOSPADM

## 2021-06-16 RX ORDER — EPHEDRINE SULFATE/0.9% NACL/PF 50 MG/5 ML
SYRINGE (ML) INTRAVENOUS AS NEEDED
Status: DISCONTINUED | OUTPATIENT
Start: 2021-06-16 | End: 2021-06-16 | Stop reason: HOSPADM

## 2021-06-16 RX ORDER — SODIUM CHLORIDE 9 MG/ML
25 INJECTION, SOLUTION INTRAVENOUS CONTINUOUS
Status: DISCONTINUED | OUTPATIENT
Start: 2021-06-16 | End: 2021-06-16 | Stop reason: HOSPADM

## 2021-06-16 RX ORDER — FENTANYL CITRATE 50 UG/ML
INJECTION, SOLUTION INTRAMUSCULAR; INTRAVENOUS AS NEEDED
Status: DISCONTINUED | OUTPATIENT
Start: 2021-06-16 | End: 2021-06-16 | Stop reason: HOSPADM

## 2021-06-16 RX ORDER — PROPOFOL 10 MG/ML
INJECTION, EMULSION INTRAVENOUS AS NEEDED
Status: DISCONTINUED | OUTPATIENT
Start: 2021-06-16 | End: 2021-06-16 | Stop reason: HOSPADM

## 2021-06-16 RX ORDER — GLYCOPYRROLATE 0.2 MG/ML
INJECTION INTRAMUSCULAR; INTRAVENOUS AS NEEDED
Status: DISCONTINUED | OUTPATIENT
Start: 2021-06-16 | End: 2021-06-16 | Stop reason: HOSPADM

## 2021-06-16 RX ADMIN — PHENYLEPHRINE HYDROCHLORIDE 200 MCG: 10 INJECTION INTRAVENOUS at 12:22

## 2021-06-16 RX ADMIN — PHENYLEPHRINE HYDROCHLORIDE 150 MCG: 10 INJECTION INTRAVENOUS at 13:28

## 2021-06-16 RX ADMIN — SODIUM CHLORIDE, SODIUM LACTATE, POTASSIUM CHLORIDE, AND CALCIUM CHLORIDE 100 ML/HR: 600; 310; 30; 20 INJECTION, SOLUTION INTRAVENOUS at 11:16

## 2021-06-16 RX ADMIN — PROPOFOL 160 MG: 10 INJECTION, EMULSION INTRAVENOUS at 12:03

## 2021-06-16 RX ADMIN — PHENYLEPHRINE HYDROCHLORIDE 100 MCG: 10 INJECTION INTRAVENOUS at 12:03

## 2021-06-16 RX ADMIN — PHENYLEPHRINE HYDROCHLORIDE 200 MCG: 10 INJECTION INTRAVENOUS at 13:19

## 2021-06-16 RX ADMIN — ONDANSETRON 4 MG: 2 INJECTION INTRAMUSCULAR; INTRAVENOUS at 12:24

## 2021-06-16 RX ADMIN — FAMOTIDINE 20 MG: 20 TABLET, FILM COATED ORAL at 11:17

## 2021-06-16 RX ADMIN — PHENYLEPHRINE HYDROCHLORIDE 150 MCG: 10 INJECTION INTRAVENOUS at 12:08

## 2021-06-16 RX ADMIN — ROCURONIUM BROMIDE 5 MG: 10 INJECTION, SOLUTION INTRAVENOUS at 14:00

## 2021-06-16 RX ADMIN — LIDOCAINE HYDROCHLORIDE 100 MG: 20 INJECTION, SOLUTION EPIDURAL; INFILTRATION; INTRACAUDAL; PERINEURAL at 12:03

## 2021-06-16 RX ADMIN — GLYCOPYRROLATE 0.6 MG: 0.2 INJECTION, SOLUTION INTRAMUSCULAR; INTRAVENOUS at 14:56

## 2021-06-16 RX ADMIN — Medication 10 MG: at 12:27

## 2021-06-16 RX ADMIN — DEXAMETHASONE SODIUM PHOSPHATE 4 MG: 4 INJECTION, SOLUTION INTRAMUSCULAR; INTRAVENOUS at 12:24

## 2021-06-16 RX ADMIN — PHENYLEPHRINE HYDROCHLORIDE 200 MCG: 10 INJECTION INTRAVENOUS at 12:51

## 2021-06-16 RX ADMIN — PROPOFOL 40 MG: 10 INJECTION, EMULSION INTRAVENOUS at 13:33

## 2021-06-16 RX ADMIN — ROCURONIUM BROMIDE 40 MG: 10 INJECTION, SOLUTION INTRAVENOUS at 12:03

## 2021-06-16 RX ADMIN — Medication 3 AMPULE: at 11:17

## 2021-06-16 RX ADMIN — CEFAZOLIN 2 G: 1 INJECTION, POWDER, FOR SOLUTION INTRAVENOUS at 12:22

## 2021-06-16 RX ADMIN — Medication 4 MG: at 14:56

## 2021-06-16 RX ADMIN — Medication 15 MG: at 13:48

## 2021-06-16 RX ADMIN — FENTANYL CITRATE 100 MCG: 50 INJECTION INTRAMUSCULAR; INTRAVENOUS at 12:03

## 2021-06-16 RX ADMIN — PHENYLEPHRINE HYDROCHLORIDE 200 MCG: 10 INJECTION INTRAVENOUS at 12:19

## 2021-06-16 RX ADMIN — SODIUM CHLORIDE, SODIUM LACTATE, POTASSIUM CHLORIDE, AND CALCIUM CHLORIDE: 600; 310; 30; 20 INJECTION, SOLUTION INTRAVENOUS at 13:07

## 2021-06-16 RX ADMIN — PHENYLEPHRINE HYDROCHLORIDE 200 MCG: 10 INJECTION INTRAVENOUS at 12:41

## 2021-06-16 NOTE — PERIOP NOTES
Pt and wife Tesha Seek given discharge instructions at bedside by Jose Randolph RN, both verbalize understanding. All questions answered.

## 2021-06-16 NOTE — ANESTHESIA PREPROCEDURE EVALUATION
Relevant Problems   No relevant active problems       Anesthetic History               Review of Systems / Medical History  Patient summary reviewed and pertinent labs reviewed    Pulmonary          Smoker (Former)    Pertinent negatives: No shortness of breath     Neuro/Psych              Cardiovascular    Hypertension          Past MI (NTEMI 2018), CAD and cardiac stents (2018)  Pertinent negatives: No angina    Comments: Hx ICM, s/p stent 2018: EF normalized   GI/Hepatic/Renal                Endo/Other    Diabetes    Obesity     Other Findings   Comments: Lumbar stenosis with neurogenic claudication         Physical Exam    Airway  Mallampati: III    Neck ROM: decreased range of motion   Mouth opening: Normal     Cardiovascular  Regular rate and rhythm,  S1 and S2 normal,  no murmur, click, rub, or gallop             Dental    Dentition: Edentulous     Pulmonary  Breath sounds clear to auscultation               Abdominal         Other Findings            Anesthetic Plan    ASA: 3  Anesthesia type: general            Anesthetic plan and risks discussed with: Patient

## 2021-06-16 NOTE — ANESTHESIA POSTPROCEDURE EVALUATION
Procedure(s):  LEFT L3-L4 AND LEFT L4-L5 LAMINECTOMY AND DISCECTOMY AND FACETECTOMY.     general    Anesthesia Post Evaluation      Multimodal analgesia: multimodal analgesia used between 6 hours prior to anesthesia start to PACU discharge  Patient location during evaluation: PACU  Patient participation: complete - patient participated  Level of consciousness: awake and alert  Pain management: adequate  Airway patency: patent  Anesthetic complications: no  Cardiovascular status: acceptable  Respiratory status: acceptable  Hydration status: acceptable  Post anesthesia nausea and vomiting:  none  Final Post Anesthesia Temperature Assessment:  Normothermia (36.0-37.5 degrees C)      INITIAL Post-op Vital signs:   Vitals Value Taken Time   /70 06/16/21 1550   Temp 36.9 °C (98.5 °F) 06/16/21 1545   Pulse 80 06/16/21 1550   Resp 15 06/16/21 1550   SpO2 92 % 06/16/21 1550

## 2021-06-16 NOTE — BRIEF OP NOTE
Brief Postoperative Note    Patient: Marquise Sanchez  YOB: 1935  MRN: [id number]    Date of Procedure: 6/16/2021     Pre-Op Diagnosis: Lumbar stenosis with neurogenic claudication [M48.062]  HNP (herniated nucleus pulposus), lumbar [M51.26]  Left foot drop [M21.372]    Post-Op Diagnosis: LUMBAR STENOSIS      Procedure(s):  LEFT L3-L4 AND LEFT L4-L5 LAMINECTOMY  AND FACETECTOMY    Surgeon(s):  Earnest Coy MD    Surgical Assistant: None    Anesthesia: General     Estimated Blood Loss (mL): 425 ML    Complications: NONE    Specimens: * No specimens in log *     Implants: * No implants in log *    Drains: * No LDAs found *    Findings: STENOSIS    Electronically Signed by Pasquale Menezes MD on 6/16/2021 at 2:16 PM

## 2021-06-16 NOTE — DISCHARGE INSTRUCTIONS
[facility]  [address]  [phone]    Postoperative Home Instructions  Lumbar (Back) Surgery    · Showering: You may shower the first day you are home with the dressing on. If the dressing becomes saturated, change it completely to a similar dressing. Leave the steri-strips or glue in place. If you have the brown aquacel dressing, leave it alone for 5 days and then you may remove the dressing. Do not soak in a tub, and use only soap and water on the wound. · Wound Care: A small to moderate amount of reddish drainage on the dressing is normal the first 1-2 days after surgery. If the dressing becomes saturated, change it. Large amounts of clear, watery drainage is not normal and you should call our office immediately. · Signs of Infection: Extreme tenderness at the wound, excessive redness and/or swelling, or ugly yellowish-greenish drainage from the wound. Fever greater than 100.5 may be present. If you think you have a wound infection, call our office immediately. · Driving: You may not begin driving until after your visit to our office for a wound and suture check which is normally 7-10 days after you come home from the hospital.    · Medications: You should take anti-inflammatory medications such as Motrin, Celebrex for 30 days after surgery, every day, on a regular schedule only if prescribed by your physician. The pain medicine prescribed may be taken as needed. You should take a stool softener (Colace) twice a day, drink lots of water and eat high fiber foods to avoid constipation (this is a common problem with pain medicine. Medication Interaction:  During your procedure you potentially received a medication or medications which may reduce the effectiveness of oral contraceptives.  Please consider other forms of contraception for 1 month following your procedure if you are currently using oral contraceptives as your primary form of birth control. In addition to this, we recommend continuing your oral contraceptive as prescribed, unless otherwise instructed by your physician, during this time. · Deep Breathing Exercises: Continue to do your incentive spirometry and/or deep breathing exercises to prevent risk of pneumonia. · Smoking: YOU MAY NOT SMOKE! Smoking will interfere with your healing. If you smoke, you may end up with having another surgery or more problems! · Activity: No heavy lifting for 4 weeks after surgery. This means anything heavier than a coffee cup or newspaper. After 4 weeks, you may gradually begin lifting heavier things. Avoid bending, stooping, or twisting at the waist.  Do not lie on your stomach to sleep. · You may remove your [place] hose when consistently walking. You may do steps and inclines in moderation. · Sexual Relations: You may resume sexual relations 2 weeks after your surgery. · Walking Program: You should begin walking every day on the first day after surgery. Start for short distances, then go a little farther each day. You should eventually walk 1-2 miles every day for the long term. This is very important in your recovery period because walking strengthens the spinal muscles and will help protect your disc and vertebrae. · Symptoms after Surgery: Dont be alarmed if you still have some symptoms after surgery. The nerves often require time to heal after the pressure has been taken off. Be patient, you should see improvement with time. · Follow-up: You will need to call our office for an appointment to see a nurse one week after surgery for a wound check. An appointment will be made then for you to see your surgeon about 4 weeks after surgery. Please call our office if you have any other questions or problems. Listen to your body; it will tell you if you are overdoing it. Use common sense and take care of yourself!          DIET  · Clear liquids until no nausea or vomiting; then light diet for the first day. · Advance to regular diet on second day, unless your doctor orders otherwise. · If nausea and vomiting continues, call your doctor. PAIN  · Take pain medication as directed by your doctor. · Call your doctor if pain is NOT relieved by medication. · DO NOT take aspirin of blood thinners unless directed by your doctor. MEDICATION INTERACTION:During your procedure you potentially received a medication or medications which may reduce the effectiveness of oral contraceptives. Please consider other forms of contraception for 1 month following your procedure if you are currently using oral contraceptives as your primary form of birth control. In addition to this, we recommend continuing your oral contraceptive as prescribed, unless otherwise instructed by your physician, during this time      [address] IF   · Excessive bleeding that does not stop after holding pressure over the area  · Temperature of 101 degrees F or above  · Excessive redness, swelling or bruising, and/ or green or yellow, smelly discharge from incision    After general anesthesia or intravenous sedation, for 24 hours or while taking prescription Narcotics:  · Limit your activities  · A responsible adult needs to be with you for the next 24 hours  · Do not drive and operate hazardous machinery  · Do not make important personal or business decisions  · Do not drink alcoholic beverages  · If you have not urinated within 8 hours after discharge, and you are experiencing discomfort from urinary retention, please go to the nearest ED. · If you have sleep apnea and have a CPAP machine, please use it for all naps and sleeping. · Please use caution when taking narcotics and any of your home medications that may cause drowsiness. *  Please give a list of your current medications to your Primary Care Provider.   *  Please update this list whenever your medications are discontinued, doses are      changed, or new medications (including over-the-counter products) are added. *  Please carry medication information at all times in case of emergency situations. These are general instructions for a healthy lifestyle:  No smoking/ No tobacco products/ Avoid exposure to second hand smoke  Surgeon General's Warning:  Quitting smoking now greatly reduces serious risk to your health. Obesity, smoking, and sedentary lifestyle greatly increases your risk for illness  A healthy diet, regular physical exercise & weight monitoring are important for maintaining a healthy lifestyle    You may be retaining fluid if you have a history of heart failure or if you experience any of the following symptoms:  Weight gain of 3 pounds or more overnight or 5 pounds in a week, increased swelling in our hands or feet or shortness of breath while lying flat in bed. Please call your doctor as soon as you notice any of these symptoms; do not wait until your next office visit.

## 2021-06-16 NOTE — OP NOTES
[facility]  OPERATIVE REPORT    Name:  Ly Alamo  MR#:  [id number]  :  1935  ACCOUNT #:  [de-identified]  DATE OF SERVICE:  2021    PREOPERATIVE DIAGNOSIS:  Lumbar stenosis with neurogenic claudication, L3 through L5. POSTOPERATIVE DIAGNOSIS:  Lumbar stenosis with neurogenic claudication, L3 through L5.    PROCEDURE PERFORMED:  Left L3-4 and left L4-5 laminectomy, facetectomy and foraminotomy. SURGEON:  Dai Winston MD    ASSISTANT:  None. ANESTHESIA:  General endotracheal.    COMPLICATIONS:  None. SPECIMENS REMOVED:  None. IMPLANTS:  None. ESTIMATED BLOOD LOSS:  Minimal.    PREPARATION:  ChloraPrep. HISTORY OF PRESENT ILLNESS:  An 79-year-old gentleman with neurogenic claudication and footdrop on the left. Conservative measures have failed. MRI scanning was positive for spinal stenosis, severe in nature, L4-5 greater than L3-4, primarily on the left. The patient was admitted for surgery as conservative measures had failed. PROCEDURE:  The patient was brought to the operating room, was carefully placed under general endotracheal anesthesia without complications, carefully turned prone on the Cloward frame, and the posterior aspect of the back was shaved and prepped in usual sterile fashion. A midline incision was made from L3-S1. Muscles were stripped in the left lateral subperiosteal plane with cautery and elevators and deep retractors were placed. The lateral lumbar spine x-ray confirmed the instrument pointing at L4-5. Next, a left-sided laminectomy and facetectomy were carried out with Leksell rongeurs and 2-mm Kerrison rongeurs starting first at L4-5 and then at L3-4. Significant more bony and ligamentous stenosis was present at L4-5. Eventually, a plane was created between the ligamentum flavum and the dura, and the ligamentum was removed to expose the dural sac and the L5 nerve root which was visibly swollen and slightly bruised.   A wide inferior decompression was carried out with 2-mm Kerrison rongeurs. The disk space was palpated and was felt to be calcified. The L3-4 side on the left lamina and facet were removed in a piecemeal fashion similarly with more facet hypertrophy noted here and less laminar hypertrophy. Eventually, a plane was created between the ligamentum flavum and dura, and the ligamentum was removed subtotally. Some of it was stuck to the dura and would not tease away readily. Therefore, it was left without compression. At this point, the wound was copiously irrigated with sterile saline. Bleeding points were cauterized with bipolar cautery. Thrombin and Surgiflo were used for hemostasis. No further bleeding was encountered. The wound was closed. The fascia was closed tightly with interrupted 0 Vicryl. Cellerate was placed for improved wound healing. Subcutaneous tissues were closed with interrupted 3-0 Vicryl. Skin was closed with a running locked 3-0 nylon suture and sterile dressings were placed. The patient tolerated the procedure well, was turned supine, awakened, extubated, and taken to PACU in stable condition. There were no obvious complications.       MD MAKENNA [email or web address]  D:  06/16/2021 14:29  T:  06/16/2021 19:27  JOB #:  [id number]

## 2021-12-14 PROBLEM — G89.29 CHRONIC BILATERAL LOW BACK PAIN WITHOUT SCIATICA: Status: ACTIVE | Noted: 2021-12-14

## 2021-12-14 PROBLEM — M54.50 CHRONIC BILATERAL LOW BACK PAIN WITHOUT SCIATICA: Status: ACTIVE | Noted: 2021-12-14

## 2022-03-19 PROBLEM — I25.5 ISCHEMIC CARDIOMYOPATHY: Status: ACTIVE | Noted: 2018-06-01

## 2022-03-19 PROBLEM — E66.9 OBESITY: Status: ACTIVE | Noted: 2019-05-13

## 2022-03-19 PROBLEM — M48.062 LUMBAR STENOSIS WITH NEUROGENIC CLAUDICATION: Status: ACTIVE | Noted: 2017-12-14

## 2022-03-19 PROBLEM — M54.50 CHRONIC BILATERAL LOW BACK PAIN WITHOUT SCIATICA: Status: ACTIVE | Noted: 2021-12-14

## 2022-03-19 PROBLEM — M51.26 HNP (HERNIATED NUCLEUS PULPOSUS), LUMBAR: Status: ACTIVE | Noted: 2021-06-01

## 2022-03-19 PROBLEM — I25.10 CORONARY ARTERY DISEASE INVOLVING NATIVE CORONARY ARTERY OF NATIVE HEART: Status: ACTIVE | Noted: 2018-06-13

## 2022-03-19 PROBLEM — L72.3 SEBACEOUS CYST: Status: ACTIVE | Noted: 2017-04-20

## 2022-03-19 PROBLEM — R59.0 MEDIASTINAL ADENOPATHY: Status: ACTIVE | Noted: 2018-06-14

## 2022-03-19 PROBLEM — G89.29 CHRONIC BILATERAL LOW BACK PAIN WITHOUT SCIATICA: Status: ACTIVE | Noted: 2021-12-14

## 2022-03-19 PROBLEM — M21.372 LEFT FOOT DROP: Status: ACTIVE | Noted: 2021-06-01

## 2022-03-19 PROBLEM — E78.5 DYSLIPIDEMIA: Status: ACTIVE | Noted: 2018-06-01

## 2022-03-20 PROBLEM — H35.30 MACULAR DEGENERATION: Status: ACTIVE | Noted: 2018-05-09

## 2022-03-20 PROBLEM — E87.5 HYPERKALEMIA: Status: ACTIVE | Noted: 2019-06-12

## 2022-08-10 ENCOUNTER — OFFICE VISIT (OUTPATIENT)
Dept: INTERNAL MEDICINE CLINIC | Facility: CLINIC | Age: 87
End: 2022-08-10
Payer: MEDICARE

## 2022-08-10 VITALS
TEMPERATURE: 97.2 F | RESPIRATION RATE: 16 BRPM | HEIGHT: 72 IN | HEART RATE: 65 BPM | WEIGHT: 215 LBS | DIASTOLIC BLOOD PRESSURE: 63 MMHG | SYSTOLIC BLOOD PRESSURE: 122 MMHG | BODY MASS INDEX: 29.12 KG/M2

## 2022-08-10 DIAGNOSIS — I25.10 CORONARY ARTERY DISEASE INVOLVING NATIVE CORONARY ARTERY OF NATIVE HEART WITHOUT ANGINA PECTORIS: ICD-10-CM

## 2022-08-10 DIAGNOSIS — I10 PRIMARY HYPERTENSION: Primary | ICD-10-CM

## 2022-08-10 DIAGNOSIS — I25.5 ISCHEMIC CARDIOMYOPATHY: ICD-10-CM

## 2022-08-10 DIAGNOSIS — M54.50 CHRONIC BILATERAL LOW BACK PAIN WITHOUT SCIATICA: ICD-10-CM

## 2022-08-10 DIAGNOSIS — G89.29 CHRONIC BILATERAL LOW BACK PAIN WITHOUT SCIATICA: ICD-10-CM

## 2022-08-10 DIAGNOSIS — R73.03 PREDIABETES: ICD-10-CM

## 2022-08-10 PROBLEM — L72.3 SEBACEOUS CYST: Status: RESOLVED | Noted: 2017-04-20 | Resolved: 2022-08-10

## 2022-08-10 PROBLEM — E87.5 HYPERKALEMIA: Status: RESOLVED | Noted: 2019-06-12 | Resolved: 2022-08-10

## 2022-08-10 PROBLEM — M51.26 HNP (HERNIATED NUCLEUS PULPOSUS), LUMBAR: Status: RESOLVED | Noted: 2021-06-01 | Resolved: 2022-08-10

## 2022-08-10 PROBLEM — M48.062 LUMBAR STENOSIS WITH NEUROGENIC CLAUDICATION: Status: RESOLVED | Noted: 2017-12-14 | Resolved: 2022-08-10

## 2022-08-10 LAB
ALT SERPL-CCNC: 22 U/L (ref 12–65)
ANION GAP SERPL CALC-SCNC: 4 MMOL/L (ref 7–16)
BUN SERPL-MCNC: 25 MG/DL (ref 8–23)
CALCIUM SERPL-MCNC: 9.1 MG/DL (ref 8.3–10.4)
CHLORIDE SERPL-SCNC: 102 MMOL/L (ref 98–107)
CHOLEST SERPL-MCNC: 83 MG/DL
CO2 SERPL-SCNC: 28 MMOL/L (ref 21–32)
CREAT SERPL-MCNC: 1.3 MG/DL (ref 0.8–1.5)
GLUCOSE SERPL-MCNC: 115 MG/DL (ref 65–100)
HDLC SERPL-MCNC: 27 MG/DL (ref 40–60)
HDLC SERPL: 3.1 {RATIO}
LDLC SERPL CALC-MCNC: 31 MG/DL
POTASSIUM SERPL-SCNC: 3.9 MMOL/L (ref 3.5–5.1)
SODIUM SERPL-SCNC: 134 MMOL/L (ref 138–145)
TRIGL SERPL-MCNC: 125 MG/DL (ref 35–150)
VLDLC SERPL CALC-MCNC: 25 MG/DL (ref 6–23)

## 2022-08-10 PROCEDURE — G8417 CALC BMI ABV UP PARAM F/U: HCPCS | Performed by: INTERNAL MEDICINE

## 2022-08-10 PROCEDURE — 1123F ACP DISCUSS/DSCN MKR DOCD: CPT | Performed by: INTERNAL MEDICINE

## 2022-08-10 PROCEDURE — 1036F TOBACCO NON-USER: CPT | Performed by: INTERNAL MEDICINE

## 2022-08-10 PROCEDURE — 99214 OFFICE O/P EST MOD 30 MIN: CPT | Performed by: INTERNAL MEDICINE

## 2022-08-10 PROCEDURE — G8427 DOCREV CUR MEDS BY ELIG CLIN: HCPCS | Performed by: INTERNAL MEDICINE

## 2022-08-10 SDOH — ECONOMIC STABILITY: FOOD INSECURITY: WITHIN THE PAST 12 MONTHS, THE FOOD YOU BOUGHT JUST DIDN'T LAST AND YOU DIDN'T HAVE MONEY TO GET MORE.: NEVER TRUE

## 2022-08-10 SDOH — ECONOMIC STABILITY: FOOD INSECURITY: WITHIN THE PAST 12 MONTHS, YOU WORRIED THAT YOUR FOOD WOULD RUN OUT BEFORE YOU GOT MONEY TO BUY MORE.: NEVER TRUE

## 2022-08-10 ASSESSMENT — PATIENT HEALTH QUESTIONNAIRE - PHQ9
SUM OF ALL RESPONSES TO PHQ QUESTIONS 1-9: 0
2. FEELING DOWN, DEPRESSED OR HOPELESS: 0
SUM OF ALL RESPONSES TO PHQ QUESTIONS 1-9: 0
SUM OF ALL RESPONSES TO PHQ9 QUESTIONS 1 & 2: 0
1. LITTLE INTEREST OR PLEASURE IN DOING THINGS: 0
SUM OF ALL RESPONSES TO PHQ QUESTIONS 1-9: 0
SUM OF ALL RESPONSES TO PHQ QUESTIONS 1-9: 0

## 2022-08-10 ASSESSMENT — SOCIAL DETERMINANTS OF HEALTH (SDOH): HOW HARD IS IT FOR YOU TO PAY FOR THE VERY BASICS LIKE FOOD, HOUSING, MEDICAL CARE, AND HEATING?: NOT HARD AT ALL

## 2022-08-10 NOTE — PROGRESS NOTES
8/10/2022 11:06 AM  Location:[facility] [address] [facility]  [place]  Patient #:  [id number]  YOB: 1935          YOUR LAST HEMOGLOBIN A1CS:   No results found for: HBA1C, [id number]    YOUR LAST LIPID PROFILE:   Lab Results   Component Value Date/Time    CHOL 101 07/29/2021 12:07 PM    HDL 29 07/29/2021 12:07 PM    VLDL 23 07/29/2021 12:07 PM         Lab Results   Component Value Date/Time    GFRAA 74 02/09/2022 11:15 AM    BUN 19 02/09/2022 11:15 AM     02/09/2022 11:15 AM    K 4.1 02/09/2022 11:15 AM    CL 99 02/09/2022 11:15 AM    CO2 22 02/09/2022 11:15 AM           History of Present Illness     Chief Complaint   Patient presents with    6 Month Follow-Up     Pt presents to the office today for a 6 month follow-up      Hypertension   Overall this patient seems to be stable his back pain continues to be problematic at times but is controlled. He is followed routinely by cardiology and no medication changes have been made. He denies shortness of breath or chest pain    Mr. Millie Whitman is a 82 y.o. male  who presents for office visit      Allergies   Allergen Reactions    Sulfamethoxazole-Trimethoprim Rash    Meloxicam Itching     Past Medical History:   Diagnosis Date    Borderline diabetes 10/08/2015    diet controlled    CAD (coronary artery disease) 06/2018    NSTEMI, 2 stents    COVID-19 vaccine series completed 03/04/2021    moderna vaccine completed. card scanned under media    Environmental allergies     H/O thromboembolism 4/15/2015    HCAP (healthcare-associated pneumonia) 6/13/2018    Hypertension     managed w/med    Ischemic cardiomyopathy 6/1/2018    1. LV gram:  EF 40-45% with apical hypokinesis. 2.  Echo (6/2/18):  EF 50-55%. No signnificant valve disease.      Macular degeneration     injections in eye, Dr. Barbra Aschoff S/P total knee arthroplasty 4/15/2015    left     S/P total knee arthroplasty 4/15/2015    Status post total left knee replacement 2016    Status post total right knee replacement 2016    Status post total right knee replacement 2016    Superficial incisional infection of surgical site 2021    Thromboembolus ([address] 2002    LLE; due to fall     Social History     Socioeconomic History    Marital status:      Spouse name: None    Number of children: None    Years of education: None    Highest education level: None   Tobacco Use    Smoking status: Former     Packs/day: 1.00     Types: Cigarettes     Quit date: 10/1/1960     Years since quittin.8    Smokeless tobacco: Never   Substance and Sexual Activity    Alcohol use: No    Drug use: No     Social Determinants of Health     Financial Resource Strain: Low Risk     Difficulty of Paying Living Expenses: Not hard at all   Food Insecurity: No Food Insecurity    Worried About Running Out of Food in the Last Year: Never true    Ran Out of Food in the Last Year: Never true     Past Surgical History:   Procedure Laterality Date    CATARACT REMOVAL Right     w/lens implant    COLONOSCOPY      LUMBAR LAMINECTOMY  2021    left L3-4 left L4-5 Dr. Hermosillo Angles stenosis    UT CARDIAC SURG PROCEDURE UNLIST  2018    heart cath 2 stents    TOTAL KNEE ARTHROPLASTY Right 2016    TOTAL KNEE ARTHROPLASTY Left 2015     Current Outpatient Medications   Medication Sig Dispense Refill    acetaminophen (TYLENOL) 500 MG tablet Take 1,000 mg by mouth every 6 hours as needed      aspirin 81 MG EC tablet Take by mouth daily      carvedilol (COREG) 6.25 MG tablet TAKE ONE TABLET BY MOUTH TWICE A DAY WITH MEALS      vitamin D (CHOLECALCIFEROL) 25 MCG (1000 UT) TABS tablet Take 1,000 Units by mouth      fexofenadine (ALLEGRA) 180 MG tablet Take 180 mg by mouth daily      fluticasone (FLONASE) 50 MCG/ACT nasal spray 2 sprays each nostril daily.  Indications: inflammation of the nose due to an allergy      latanoprost (XALATAN) 0.005 % ophthalmic solution Place 1 drop into both eyes nightly      lisinopril-hydroCHLOROthiazide (PRINZIDE;ZESTORETIC) 20-25 MG per tablet Take 1 tablet by mouth daily      nitroGLYCERIN (NITROSTAT) 0.4 MG SL tablet Place 0.4 mg under the tongue every 5 minutes as needed      rosuvastatin (CRESTOR) 40 MG tablet Take 40 mg by mouth every evening       No current facility-administered medications for this visit. Health Maintenance   Topic Date Due    DTaP/Tdap/Td vaccine (1 - Tdap) Never done    Shingles vaccine (1 of 2) Never done    COVID-19 Vaccine (3 - Booster for Moderna series) 08/04/2021    Lipids  07/29/2022    Flu vaccine (1) 09/01/2022    Depression Screen  02/09/2023    Annual Wellness Visit (AWV)  02/10/2023    Pneumococcal 65+ years Vaccine  Completed    Hepatitis A vaccine  Aged Out    Hepatitis B vaccine  Aged Out    Hib vaccine  Aged Out    Meningococcal (ACWY) vaccine  Aged Out     Family History   Problem Relation Age of Onset    Hypertension Mother     Heart Disease Father     Cancer Mother     Diabetes Father              Review of Systems  Review of Systems    /63 (Site: Left Upper Arm, Position: Sitting, Cuff Size: Large Adult)   Pulse 65   Temp 97.2 °F (36.2 °C) (Temporal)   Resp 16   Ht 6' (1.829 m)   Wt 215 lb (97.5 kg)   BMI 29.16 kg/m²       Physical Exam    Physical Exam  Constitutional:       General: He is not in acute distress. Appearance: Normal appearance. He is not ill-appearing. HENT:      Head: Normocephalic and atraumatic. Eyes:      Extraocular Movements: Extraocular movements intact. Pupils: Pupils are equal, round, and reactive to light. Cardiovascular:      Rate and Rhythm: Normal rate and regular rhythm. Pulmonary:      Effort: Pulmonary effort is normal.      Breath sounds: Normal breath sounds. Skin:     General: Skin is warm. Neurological:      Mental Status: He is alert. Motor: No weakness. medications. 1. Primary hypertension  Good control noted  - Basic Metabolic Panel; Future  - Lipid Panel; Future  - ALT; Future    2. Ischemic cardiomyopathy  Seems compensated    3. Coronary artery disease involving native coronary artery of native heart without angina pectoris  Followed by cardiology doing well     4. Prediabetes     - Hemoglobin A1C; Future    5. Chronic bilateral low back pain without sciatica  Symptomatic but he has had surgery which did provide some improvement. He is no longer followed by neurosurgery      No follow-up provider specified.         Ness Echevarria MD

## 2022-08-11 LAB
EST. AVERAGE GLUCOSE BLD GHB EST-MCNC: 128 MG/DL
HBA1C MFR BLD: 6.1 % (ref 4.8–5.6)

## 2022-09-07 RX ORDER — FLUTICASONE PROPIONATE 50 MCG
SPRAY, SUSPENSION (ML) NASAL
Qty: 1 EACH | Refills: 11 | Status: SHIPPED | OUTPATIENT
Start: 2022-09-07

## 2022-12-26 ENCOUNTER — APPOINTMENT (OUTPATIENT)
Dept: GENERAL RADIOLOGY | Age: 87
End: 2022-12-26
Payer: MEDICARE

## 2022-12-26 ENCOUNTER — HOSPITAL ENCOUNTER (EMERGENCY)
Age: 87
Discharge: HOME | End: 2022-12-26
Attending: STUDENT IN AN ORGANIZED HEALTH CARE EDUCATION/TRAINING PROGRAM
Payer: MEDICARE

## 2022-12-26 VITALS
SYSTOLIC BLOOD PRESSURE: 162 MMHG | HEART RATE: 103 BPM | DIASTOLIC BLOOD PRESSURE: 76 MMHG | RESPIRATION RATE: 18 BRPM | TEMPERATURE: 99.7 F | OXYGEN SATURATION: 100 %
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
## 2022-12-26 LAB
ALBUMIN SERPL-MCNC: 3.6 G/DL (ref 3.2–4.6)
ALBUMIN/GLOB SERPL: 0.8 (ref 0.4–1.6)
ALP SERPL-CCNC: 102 U/L (ref 50–136)
ALT SERPL-CCNC: 36 U/L (ref 12–65)
ANION GAP SERPL CALC-SCNC: 11 MMOL/L (ref 2–11)
AST SERPL-CCNC: 44 U/L (ref 15–37)
BASOPHILS # BLD: 0 K/UL (ref 0–0.2)
BASOPHILS NFR BLD: 0 % (ref 0–2)
BILIRUB SERPL-MCNC: 0.5 MG/DL (ref 0.2–1.1)
BUN SERPL-MCNC: 28 MG/DL (ref 8–23)
CALCIUM SERPL-MCNC: 9.7 MG/DL (ref 8.3–10.4)
CHLORIDE SERPL-SCNC: 99 MMOL/L (ref 101–110)
CO2 SERPL-SCNC: 23 MMOL/L (ref 21–32)
CREAT SERPL-MCNC: 1.2 MG/DL (ref 0.8–1.5)
DIFFERENTIAL METHOD BLD: ABNORMAL
EOSINOPHIL # BLD: 0 K/UL (ref 0–0.8)
EOSINOPHIL NFR BLD: 0 % (ref 0.5–7.8)
ERYTHROCYTE [DISTWIDTH] IN BLOOD BY AUTOMATED COUNT: 12.7 % (ref 11.9–14.6)
GLOBULIN SER CALC-MCNC: 4.5 G/DL (ref 2.8–4.5)
GLUCOSE SERPL-MCNC: 125 MG/DL (ref 65–100)
HCT VFR BLD AUTO: 36.7 % (ref 41.1–50.3)
HGB BLD-MCNC: 12.5 G/DL (ref 13.6–17.2)
IMM GRANULOCYTES # BLD AUTO: 0.1 K/UL (ref 0–0.5)
IMM GRANULOCYTES NFR BLD AUTO: 1 % (ref 0–5)
LYMPHOCYTES # BLD: 1.1 K/UL (ref 0.5–4.6)
LYMPHOCYTES NFR BLD: 10 % (ref 13–44)
MCH RBC QN AUTO: 30.1 PG (ref 26.1–32.9)
MCHC RBC AUTO-ENTMCNC: 34.1 G/DL (ref 31.4–35)
MCV RBC AUTO: 88.4 FL (ref 82–102)
MONOCYTES # BLD: 0.8 K/UL (ref 0.1–1.3)
MONOCYTES NFR BLD: 7 % (ref 4–12)
NEUTS SEG # BLD: 9.1 K/UL (ref 1.7–8.2)
NEUTS SEG NFR BLD: 82 % (ref 43–78)
NRBC # BLD: 0 K/UL (ref 0–0.2)
PLATELET # BLD AUTO: 230 K/UL (ref 150–450)
PMV BLD AUTO: 9.1 FL (ref 9.4–12.3)
POTASSIUM SERPL-SCNC: 3.3 MMOL/L (ref 3.5–5.1)
PROT SERPL-MCNC: 8.1 G/DL (ref 6.3–8.2)
RBC # BLD AUTO: 4.15 M/UL (ref 4.23–5.6)
SODIUM SERPL-SCNC: 133 MMOL/L (ref 133–143)
WBC # BLD AUTO: 11 K/UL (ref 4.3–11.1)

## 2022-12-26 PROCEDURE — 90471 IMMUNIZATION ADMIN: CPT | Performed by: STUDENT IN AN ORGANIZED HEALTH CARE EDUCATION/TRAINING PROGRAM

## 2022-12-26 PROCEDURE — 85025 COMPLETE CBC W/AUTO DIFF WBC: CPT

## 2022-12-26 PROCEDURE — 2500000003 HC RX 250 WO HCPCS: Performed by: STUDENT IN AN ORGANIZED HEALTH CARE EDUCATION/TRAINING PROGRAM

## 2022-12-26 PROCEDURE — 96365 THER/PROPH/DIAG IV INF INIT: CPT

## 2022-12-26 PROCEDURE — 6360000002 HC RX W HCPCS: Performed by: STUDENT IN AN ORGANIZED HEALTH CARE EDUCATION/TRAINING PROGRAM

## 2022-12-26 PROCEDURE — 80053 COMPREHEN METABOLIC PANEL: CPT

## 2022-12-26 PROCEDURE — 90715 TDAP VACCINE 7 YRS/> IM: CPT | Performed by: STUDENT IN AN ORGANIZED HEALTH CARE EDUCATION/TRAINING PROGRAM

## 2022-12-26 PROCEDURE — 99284 EMERGENCY DEPT VISIT MOD MDM: CPT

## 2022-12-26 PROCEDURE — 73130 X-RAY EXAM OF HAND: CPT

## 2022-12-26 PROCEDURE — 6370000000 HC RX 637 (ALT 250 FOR IP): Performed by: STUDENT IN AN ORGANIZED HEALTH CARE EDUCATION/TRAINING PROGRAM

## 2022-12-26 PROCEDURE — 90471 IMMUNIZATION ADMIN: CPT

## 2022-12-26 RX ORDER — HYDROCODONE BITARTRATE AND ACETAMINOPHEN 5; 325 MG/1; MG/1
1 TABLET ORAL
Status: COMPLETED | OUTPATIENT
Start: 2022-12-26 | End: 2022-12-26

## 2022-12-26 RX ORDER — CLINDAMYCIN PHOSPHATE 600 MG/50ML
600 INJECTION INTRAVENOUS
Status: COMPLETED | OUTPATIENT
Start: 2022-12-26 | End: 2022-12-26

## 2022-12-26 RX ORDER — HYDROCODONE BITARTRATE AND ACETAMINOPHEN 5; 325 MG/1; MG/1
1 TABLET ORAL EVERY 6 HOURS PRN
Qty: 10 TABLET | Refills: 0 | Status: ON HOLD | OUTPATIENT
Start: 2022-12-26 | End: 2023-01-01 | Stop reason: HOSPADM

## 2022-12-26 RX ORDER — AMOXICILLIN AND CLAVULANATE POTASSIUM 875; 125 MG/1; MG/1
1 TABLET, FILM COATED ORAL 2 TIMES DAILY
Qty: 8 TABLET | Refills: 0 | Status: ON HOLD | OUTPATIENT
Start: 2022-12-26 | End: 2023-01-01 | Stop reason: HOSPADM

## 2022-12-26 RX ADMIN — TETANUS TOXOID, REDUCED DIPHTHERIA TOXOID AND ACELLULAR PERTUSSIS VACCINE, ADSORBED 0.5 ML: 5; 2.5; 8; 8; 2.5 SUSPENSION INTRAMUSCULAR at 21:30

## 2022-12-26 RX ADMIN — CLINDAMYCIN PHOSPHATE 600 MG: 600 INJECTION, SOLUTION INTRAVENOUS at 21:30

## 2022-12-26 RX ADMIN — HYDROCODONE BITARTRATE AND ACETAMINOPHEN 1 TABLET: 5; 325 TABLET ORAL at 21:30

## 2022-12-26 ASSESSMENT — PAIN SCALES - GENERAL: PAINLEVEL_OUTOF10: 8

## 2022-12-26 ASSESSMENT — ENCOUNTER SYMPTOMS: COLOR CHANGE: 1

## 2022-12-27 ENCOUNTER — TELEPHONE (OUTPATIENT)
Dept: INTERNAL MEDICINE CLINIC | Facility: CLINIC | Age: 87
End: 2022-12-27

## 2022-12-27 ENCOUNTER — TELEPHONE (OUTPATIENT)
Dept: ORTHOPEDIC SURGERY | Age: 87
End: 2022-12-27

## 2022-12-27 RX ORDER — AMOXICILLIN AND CLAVULANATE POTASSIUM 875; 125 MG/1; MG/1
1 TABLET, FILM COATED ORAL 2 TIMES DAILY
Qty: 10 TABLET | Refills: 0 | Status: ON HOLD | OUTPATIENT
Start: 2022-12-27 | End: 2023-01-01 | Stop reason: HOSPADM

## 2022-12-27 NOTE — ED PROVIDER NOTES
Emergency Department Provider Note                   PCP:                Romina Britton MD               Age: 82 y.o. Sex: male     No diagnosis found. DISPOSITION          MDM  Number of Diagnoses or Management Options  Cat bite, initial encounter: new, needed workup  Diagnosis management comments: 61-year-old male patient presenting with cellulitic changes after being bitten by his cat a week ago. He is unsure of his last tetanus shot. Will obtain basic labs, x-ray, update tetanus and give a dose of clindamycin in this department. Anticipate discharge home with close outpatient follow-up. Amount and/or Complexity of Data Reviewed  Clinical lab tests: ordered and reviewed  Tests in the radiology section of CPT®: ordered and reviewed  Tests in the medicine section of CPT®: ordered and reviewed    Risk of Complications, Morbidity, and/or Mortality  Presenting problems: moderate  Diagnostic procedures: low  Management options: moderate    Patient Progress  Patient progress: stable                        ED Course as of 12/26/22 2214   Mon Dec 26, 2022   2102 X-ray imaging shows no soft tissue gas. Soft tissue swelling is noted however, no obvious osseous defect.   There is mention of suspected foreign body over the trapezium, this is inconsistent with patient's wound location. [BR]      ED Course User Index  [BR] Chad Swenson DO        Orders Placed This Encounter   Procedures    XR HAND LEFT (MIN 3 VIEWS)    CBC with Auto Differential    Comprehensive Metabolic Panel        Medications   diptheria-tetanus toxoids (DECAVAC) 2-2 LF/0.5ML injection 0.5 mL (has no administration in time range)   clindamycin (CLEOCIN) 600 mg in dextrose 5 % 50 mL IVPB (has no administration in time range)   HYDROcodone-acetaminophen (NORCO) 5-325 MG per tablet 1 tablet (has no administration in time range)       New Prescriptions    No medications on file        Devan Ibarra is a 82 y.o. male who presents to the Emergency Department with chief complaint of    Chief Complaint   Patient presents with    Animal Bite      82-year-old male patient presenting to this department with reports of left hand pain, swelling and redness after he was bitten by his own cat last week. Patient states he had taken the cat to the vet and was attempting get Out of its carrier when he was bitten. He is unsure of his last tetanus shot but states the cat is up-to-date on vaccines. He has noted progressive swelling, redness and pain since. He provisionally presented to urgent care but was referred to this department for \"stronger antibiotics\". Patient denies associated fever or chills. The history is provided by the spouse and the patient. No  was used. Review of Systems   Constitutional:  Negative for chills and fever. Skin:  Positive for color change and wound. Negative for rash. All other systems reviewed and are negative. Past Medical History:   Diagnosis Date    Borderline diabetes 10/08/2015    diet controlled    CAD (coronary artery disease) 06/2018    NSTEMI, 2 stents    COVID-19 vaccine series completed 03/04/2021    moderna vaccine completed. card scanned under media    Environmental allergies     H/O thromboembolism 4/15/2015    HCAP (healthcare-associated pneumonia) 6/13/2018    Hypertension     managed w/med    Ischemic cardiomyopathy 6/1/2018    1. LV gram:  EF 40-45% with apical hypokinesis. 2.  Echo (6/2/18):  EF 50-55%. No signnificant valve disease.      Macular degeneration     injections in eye, [facility]    Osteoarthritis     S/P total knee arthroplasty 4/15/2015    left     S/P total knee arthroplasty 4/15/2015    Status post total left knee replacement 1/6/2016    Status post total right knee replacement 1/11/2016    Status post total right knee replacement 1/11/2016    Superficial incisional infection of surgical site 08/09/2021 Thromboembolus ([address] 2002    LLE; due to fall        Past Surgical History:   Procedure Laterality Date    CATARACT REMOVAL Right     w/lens implant    COLONOSCOPY      LUMBAR LAMINECTOMY  2021    left L3-4 left L4-5 Dr. Abdirashid Smart stenosis    [facility]  2018    heart cath 2 stents    TOTAL KNEE ARTHROPLASTY Right 2016    TOTAL KNEE ARTHROPLASTY Left 2015        Family History   Problem Relation Age of Onset    Hypertension Mother     Heart Disease Father     Cancer Mother     Diabetes Father         Social History     Socioeconomic History    Marital status:      Spouse name: None    Number of children: None    Years of education: None    Highest education level: None   Tobacco Use    Smoking status: Former     Packs/day: 1.00     Types: Cigarettes     Quit date: 10/1/1960     Years since quittin.2    Smokeless tobacco: Never   Vaping Use    Vaping Use: Never used   Substance and Sexual Activity    Alcohol use: No    Drug use: No    Sexual activity: Not Currently     Social Determinants of Health     Financial Resource Strain: Low Risk     Difficulty of Paying Living Expenses: Not hard at all   Food Insecurity: No Food Insecurity    Worried About Running Out of Food in the Last Year: Never true    Ran Out of Food in the Last Year: Never true         Sulfamethoxazole-trimethoprim and Meloxicam     Previous Medications    ACETAMINOPHEN (TYLENOL) 500 MG TABLET    Take 1,000 mg by mouth every 6 hours as needed    ASPIRIN 81 MG EC TABLET    Take by mouth daily    CARVEDILOL (COREG) 6.25 MG TABLET    TAKE ONE TABLET BY MOUTH TWICE A DAY WITH MEALS    FEXOFENADINE (ALLEGRA) 180 MG TABLET    Take 180 mg by mouth daily    FLUTICASONE (FLONASE) 50 MCG/ACT NASAL SPRAY    ADMINISTER 2 SPRAYS EACH NOSTRIL DAILY    LATANOPROST (XALATAN) 0.005 % OPHTHALMIC SOLUTION    Place 1 drop into both eyes nightly    LISINOPRIL-HYDROCHLOROTHIAZIDE (PRINZIDE;ZESTORETIC) 20-25 MG PER TABLET    Take 1 tablet by mouth daily    NITROGLYCERIN (NITROSTAT) 0.4 MG SL TABLET    Place 0.4 mg under the tongue every 5 minutes as needed    ROSUVASTATIN (CRESTOR) 40 MG TABLET    Take 40 mg by mouth every evening    VITAMIN D (CHOLECALCIFEROL) 25 MCG (1000 UT) TABS TABLET    Take 1,000 Units by mouth        Vitals signs and nursing note reviewed. Patient Vitals for the past 4 hrs:   Temp Pulse Resp BP SpO2   12/26/22 1709 99.7 °F (37.6 °C) (!) 103 18 (!) 162/76 100 %          Physical Exam  Vitals and nursing note reviewed. Constitutional:       Appearance: Normal appearance. HENT:      Head: Normocephalic and atraumatic. Right Ear: External ear normal.      Left Ear: External ear normal.      Nose: Nose normal.      Mouth/Throat:      Mouth: Mucous membranes are moist.   Eyes:      Extraocular Movements: Extraocular movements intact. Pulmonary:      Effort: Pulmonary effort is normal. No respiratory distress. Abdominal:      General: Abdomen is flat. Musculoskeletal:         General: Normal range of motion. Cervical back: Normal range of motion. Comments: There are superficial abrasions noted to encircle the left thumb over the dorsal and volar aspect. The entirety of the thumb is swollen, edematous and erythematous. This does not extend to the tip of the digit however. Mainly including the thenar eminence on my exam.  Distal cap refill is normal.  Pulses are palpated normal over the ulnar and radial aspect. Skin:     General: Skin is warm. Capillary Refill: Capillary refill takes less than 2 seconds. Neurological:      Mental Status: He is alert. Psychiatric:         Mood and Affect: Mood normal.        Procedures          No results found for any visits on 12/26/22. XR HAND LEFT (MIN 3 VIEWS)    (Results Pending)                       Voice dictation software was used during the making of this note.   This software is not perfect and grammatical and other typographical errors may be present. This note has not been completely proofread for errors.         Yandy Gonzalez,   01/05/23 2226

## 2022-12-27 NOTE — TELEPHONE ENCOUNTER
PT saw in ED yesterday 12/26/2022 for a hand infection and was given a RX for an antibiotic but it only has 8 pills, he was told to take it for 10 days, twice a day. PT wants to know if we can send in a prescription to finish the pills. He was saw at [facility] ED. Amox-clav 875-125mg tablets.   Narinder Hernandez

## 2022-12-27 NOTE — TELEPHONE ENCOUNTER
Left VM for patient to please call us back so I can get him on the scheduled to see Dr. Jeana Hou either today or Thursday. Advised them to please call me back.

## 2022-12-27 NOTE — DISCHARGE INSTRUCTIONS
Please contact the provider listed to arrange close outpatient follow-up. This wound should be evaluated in the next 48 hours to assess for progression. Return sooner if symptoms worsen or you have any other concerns or questions. Take the antibiotics and pain medication prescribed as directed.

## 2022-12-27 NOTE — ED TRIAGE NOTES
Pt to ED with c/o redness and swelling to left hand after being bitten by his own cat x1 week ago. Pt states was seen at urgent care and told to come to ED for possible IV antibiotics. Pt states cat is up to date on vaccines. Pt alert and in NAD at this time.

## 2022-12-27 NOTE — ED NOTES
I have reviewed discharge instructions with the patient. The patient verbalized understanding. Patient left ED via Discharge Method: ambulatory to Home with family    Opportunity for questions and clarification provided. Patient given 2 scripts. To continue your aftercare when you leave the hospital, you may receive an automated call from our care team to check in on how you are doing. This is a free service and part of our promise to provide the best care and service to meet your aftercare needs.  If you have questions, or wish to unsubscribe from this service please call [phone]. Thank you for Choosing our [facility] Emergency Department.         Willa Javed RN  12/26/22 2160

## 2022-12-28 ENCOUNTER — TELEPHONE (OUTPATIENT)
Dept: ORTHOPEDIC SURGERY | Age: 87
End: 2022-12-28

## 2022-12-29 ENCOUNTER — OFFICE VISIT (OUTPATIENT)
Dept: ORTHOPEDIC SURGERY | Age: 87
End: 2022-12-29

## 2022-12-29 VITALS — BODY MASS INDEX: 29.8 KG/M2 | WEIGHT: 220 LBS | HEIGHT: 72 IN

## 2022-12-29 DIAGNOSIS — L02.512 ABSCESS OF LEFT HAND: Primary | ICD-10-CM

## 2022-12-29 DIAGNOSIS — S61.452A CAT BITE OF LEFT HAND, INITIAL ENCOUNTER: ICD-10-CM
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
## 2022-12-29 NOTE — H&P (VIEW-ONLY)
Orthopaedic Hand Clinic Note    Name: Daniel Hoffmann  YOB: 1935  Gender: male  MRN: [id number]      CC: Patient referred for evaluation of upper extremity pain    HPI: Daniel Hoffmann is a 82 y.o. male with a chief complaint of cat bite to his left hand which occurred on 12/20/2022. He was seen in the emergency department 5 days later, was given a dose of IV clindamycin, and sent home with a prescription for Augmentin which she has been taking. He says that some of the pain on the back of the hand has improved. .      ROS/Meds/PSH/PMH/FH/SH: I personally reviewed the patients standard intake form. Pertinents are discussed in the HPI    Physical Examination:    Musculoskeletal Exam:  Examination on the left upper extremity demonstrates cap refill < 5 seconds in all fingers, there are 2 punctate wounds on the radial and ulnar aspects of the thumb metacarpophalangeal joint. There is visible purulence subcutaneously at the wound on the radial aspect of the joint, and there is expressible purulent drainage. He has erythema over the dorsum of the hand extending to the level of the thumb interphalangeal joint. There is erythema and swelling at the thenar eminence extending over the volar aspect of the thumb. He has mild pain with flexion and extension of the thumb interphalangeal joint. There is no fusiform swelling of the thumb. There is mild pain with thumb metacarpophalangeal joint range of motion. There is lymphangitic streaking up the volar aspect of the forearm. Imaging / Electrodiagnostic Tests: Independently reviewed and interpreted radiographs of the left hand from the emergency department. They demonstrate diffuse degenerative changes    Assessment:     ICD-10-CM    1. Abscess of left hand  L02.512       2. Cat bite of left hand, initial encounter  S61.452A     W55. 01XA           Plan:   We discussed the diagnosis and different treatment options.   Patient has a hand abscess secondary to cat bite. He has failed treatment with oral antibiotics. I have recommended surgical treatment with irrigation and debridement. Have also explained that patient will require admission to the hospital for a course of IV antibiotics. Failure to treat this condition surgically will result in worsening infection, possible septic arthritis or flexor tenosynovitis. Patient understands risks and benefits of left hand irrigation and debridement including but not limited to nerve injury, vessel injury, infection, failure to achieve desired results and possible need for additional surgery. Patient understands and wishes to proceed with surgery. On Exam:   The patient is alert and oriented  Cardiovascular: regular rate and rhythm  Respiratory: Non labored breathing      Patient voiced accordance and understanding of the information provided and the formulated plan. All questions were answered to the patient's satisfaction during the encounter.       Contreras Henderson MD  Orthopaedic Surgery  12/29/22  11:00 AM

## 2022-12-29 NOTE — PERIOP NOTE
Patient verified name and . Order for consent not found in EHR; patient verifies procedure. Type 1B surgery, phone assessment complete. Orders not received. Labs per surgeon: none  Labs per anesthesia protocol: K+ dos    Patient answered medical/surgical history questions at their best of ability. All prior to admission medications documented in [facility] Care. Patient instructed to take the following medications the day of surgery according to anesthesia guidelines with a small sip of water: Augmentin, aspirin, coreg, allegra, norco if needed, On the day before surgery please take Acetaminophen 1000mg in the morning and then again before bed. You may substitute for Tylenol 650 mg. Hold all vitamins 7 days prior to surgery and NSAIDS 5 days prior to surgery. Prescription meds to hold:lisinopril-HCTZ  Patient instructed on the following:    > Arrive at [facility], time of arrival to be called the day before by 1700  > NPO after midnight, unless otherwise indicated, including gum, mints, and ice chips  > Responsible adult must drive patient to the hospital, stay during surgery, and patient will need supervision 24 hours after anesthesia  > Use antibacterial soap in shower the night before surgery and on the morning of surgery  > All piercings must be removed prior to arrival.    > Leave all valuables (money and jewelry) at home but bring insurance card and ID on DOS.   > You may be required to pay a deductible or co-pay on the day of your procedure. You can pre-pay by calling [phone] if your surgery is at the [facility] or [phone] if your surgery is at the [facility]. > Do not wear make-up, nail polish, lotions, cologne, perfumes, powders, or oil on skin. Artificial nails are not permitted.

## 2022-12-29 NOTE — PROGRESS NOTES
Orthopaedic Hand Clinic Note    Name: Onel Jimenez  YOB: 1935  Gender: male  MRN: [id number]      CC: Patient referred for evaluation of upper extremity pain    HPI: Onel Jimenez is a 82 y.o. male with a chief complaint of cat bite to his left hand which occurred on 12/20/2022. He was seen in the emergency department 5 days later, was given a dose of IV clindamycin, and sent home with a prescription for Augmentin which she has been taking. He says that some of the pain on the back of the hand has improved. .      ROS/Meds/PSH/PMH/FH/SH: I personally reviewed the patients standard intake form. Pertinents are discussed in the HPI    Physical Examination:    Musculoskeletal Exam:  Examination on the left upper extremity demonstrates cap refill < 5 seconds in all fingers, there are 2 punctate wounds on the radial and ulnar aspects of the thumb metacarpophalangeal joint. There is visible purulence subcutaneously at the wound on the radial aspect of the joint, and there is expressible purulent drainage. He has erythema over the dorsum of the hand extending to the level of the thumb interphalangeal joint. There is erythema and swelling at the thenar eminence extending over the volar aspect of the thumb. He has mild pain with flexion and extension of the thumb interphalangeal joint. There is no fusiform swelling of the thumb. There is mild pain with thumb metacarpophalangeal joint range of motion. There is lymphangitic streaking up the volar aspect of the forearm. Imaging / Electrodiagnostic Tests: Independently reviewed and interpreted radiographs of the left hand from the emergency department. They demonstrate diffuse degenerative changes    Assessment:     ICD-10-CM    1. Abscess of left hand  L02.512       2. Cat bite of left hand, initial encounter  S61.452A     W55. 01XA           Plan:   We discussed the diagnosis and different treatment options.   Patient has a hand abscess secondary to cat bite. He has failed treatment with oral antibiotics. I have recommended surgical treatment with irrigation and debridement. Have also explained that patient will require admission to the hospital for a course of IV antibiotics. Failure to treat this condition surgically will result in worsening infection, possible septic arthritis or flexor tenosynovitis. Patient understands risks and benefits of left hand irrigation and debridement including but not limited to nerve injury, vessel injury, infection, failure to achieve desired results and possible need for additional surgery. Patient understands and wishes to proceed with surgery. On Exam:   The patient is alert and oriented  Cardiovascular: regular rate and rhythm  Respiratory: Non labored breathing      Patient voiced accordance and understanding of the information provided and the formulated plan. All questions were answered to the patient's satisfaction during the encounter.       Maddy Hester MD  Orthopaedic Surgery  12/29/22  11:00 AM

## 2022-12-30 ENCOUNTER — ANESTHESIA EVENT (OUTPATIENT)
Dept: SURGERY | Age: 87
End: 2022-12-30
Payer: MEDICARE

## 2022-12-30 ENCOUNTER — ANESTHESIA (OUTPATIENT)
Dept: SURGERY | Age: 87
End: 2022-12-30
Payer: MEDICARE

## 2022-12-30 ENCOUNTER — HOSPITAL ENCOUNTER (INPATIENT)
Age: 87
LOS: 1 days | Discharge: HOME HEALTH | DRG: 513 | End: 2023-01-02
Attending: ORTHOPAEDIC SURGERY | Admitting: ORTHOPAEDIC SURGERY
Payer: MEDICARE
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
## 2022-12-30 DIAGNOSIS — L02.512: ICD-10-CM

## 2022-12-30 DIAGNOSIS — S61.452A CAT BITE OF HAND, LEFT, INITIAL ENCOUNTER: ICD-10-CM

## 2022-12-30 DIAGNOSIS — L02.519 ABSCESS, HAND: Primary | ICD-10-CM

## 2022-12-30 PROBLEM — Z78.9: Status: ACTIVE | Noted: 2022-12-30

## 2022-12-30 LAB
GLUCOSE BLD STRIP.AUTO-MCNC: 118 MG/DL (ref 65–100)
POTASSIUM BLD-SCNC: 3.8 MMOL/L (ref 3.5–5.1)
SERVICE CMNT-IMP: ABNORMAL

## 2022-12-30 PROCEDURE — 0LB80ZZ EXCISION OF LEFT HAND TENDON, OPEN APPROACH: ICD-10-PCS | Performed by: ORTHOPAEDIC SURGERY

## 2022-12-30 PROCEDURE — 3700000001 HC ADD 15 MINUTES (ANESTHESIA): Performed by: ORTHOPAEDIC SURGERY

## 2022-12-30 PROCEDURE — 3600000004 HC SURGERY LEVEL 4 BASE: Performed by: ORTHOPAEDIC SURGERY

## 2022-12-30 PROCEDURE — 6360000002 HC RX W HCPCS: Performed by: ANESTHESIOLOGY

## 2022-12-30 PROCEDURE — G0378 HOSPITAL OBSERVATION PER HR: HCPCS

## 2022-12-30 PROCEDURE — 7100000000 HC PACU RECOVERY - FIRST 15 MIN: Performed by: ORTHOPAEDIC SURGERY

## 2022-12-30 PROCEDURE — 76942 ECHO GUIDE FOR BIOPSY: CPT | Performed by: ANESTHESIOLOGY

## 2022-12-30 PROCEDURE — 2709999900 HC NON-CHARGEABLE SUPPLY: Performed by: ORTHOPAEDIC SURGERY

## 2022-12-30 PROCEDURE — 2500000003 HC RX 250 WO HCPCS: Performed by: ANESTHESIOLOGY

## 2022-12-30 PROCEDURE — 3700000000 HC ANESTHESIA ATTENDED CARE: Performed by: ORTHOPAEDIC SURGERY

## 2022-12-30 PROCEDURE — 84132 ASSAY OF SERUM POTASSIUM: CPT

## 2022-12-30 PROCEDURE — 6360000002 HC RX W HCPCS: Performed by: ORTHOPAEDIC SURGERY

## 2022-12-30 PROCEDURE — 6360000002 HC RX W HCPCS: Performed by: NURSE ANESTHETIST, CERTIFIED REGISTERED

## 2022-12-30 PROCEDURE — 3600000014 HC SURGERY LEVEL 4 ADDTL 15MIN: Performed by: ORTHOPAEDIC SURGERY

## 2022-12-30 PROCEDURE — 87070 CULTURE OTHR SPECIMN AEROBIC: CPT

## 2022-12-30 PROCEDURE — 82962 GLUCOSE BLOOD TEST: CPT

## 2022-12-30 PROCEDURE — 2580000003 HC RX 258: Performed by: ORTHOPAEDIC SURGERY

## 2022-12-30 PROCEDURE — 2580000003 HC RX 258: Performed by: ANESTHESIOLOGY

## 2022-12-30 PROCEDURE — 6370000000 HC RX 637 (ALT 250 FOR IP): Performed by: ANESTHESIOLOGY

## 2022-12-30 PROCEDURE — 87075 CULTR BACTERIA EXCEPT BLOOD: CPT

## 2022-12-30 PROCEDURE — 6370000000 HC RX 637 (ALT 250 FOR IP): Performed by: ORTHOPAEDIC SURGERY

## 2022-12-30 PROCEDURE — 2500000003 HC RX 250 WO HCPCS: Performed by: NURSE ANESTHETIST, CERTIFIED REGISTERED

## 2022-12-30 PROCEDURE — 7100000001 HC PACU RECOVERY - ADDTL 15 MIN: Performed by: ORTHOPAEDIC SURGERY

## 2022-12-30 PROCEDURE — 3E0T3BZ INTRODUCTION OF ANESTHETIC AGENT INTO PERIPHERAL NERVES AND PLEXI, PERCUTANEOUS APPROACH: ICD-10-PCS | Performed by: ORTHOPAEDIC SURGERY

## 2022-12-30 RX ORDER — ASPIRIN 81 MG/1
81 TABLET ORAL DAILY
Status: DISCONTINUED | OUTPATIENT
Start: 2022-12-30 | End: 2023-01-02 | Stop reason: HOSPADM

## 2022-12-30 RX ORDER — ACETAMINOPHEN 500 MG
1000 TABLET ORAL ONCE
Status: CANCELLED | OUTPATIENT
Start: 2022-12-30 | End: 2022-12-30

## 2022-12-30 RX ORDER — FENTANYL CITRATE 50 UG/ML
100 INJECTION, SOLUTION INTRAMUSCULAR; INTRAVENOUS
Status: CANCELLED | OUTPATIENT
Start: 2022-12-30 | End: 2022-12-31

## 2022-12-30 RX ORDER — LIDOCAINE HYDROCHLORIDE 20 MG/ML
INJECTION, SOLUTION EPIDURAL; INFILTRATION; INTRACAUDAL; PERINEURAL PRN
Status: DISCONTINUED | OUTPATIENT
Start: 2022-12-30 | End: 2022-12-30 | Stop reason: SDUPTHER

## 2022-12-30 RX ORDER — LATANOPROST 50 UG/ML
1 SOLUTION/ DROPS OPHTHALMIC NIGHTLY
Status: DISCONTINUED | OUTPATIENT
Start: 2022-12-30 | End: 2023-01-02 | Stop reason: HOSPADM

## 2022-12-30 RX ORDER — SODIUM CHLORIDE 0.9 % (FLUSH) 0.9 %
5-40 SYRINGE (ML) INJECTION EVERY 12 HOURS SCHEDULED
Status: DISCONTINUED | OUTPATIENT
Start: 2022-12-30 | End: 2022-12-30 | Stop reason: HOSPADM

## 2022-12-30 RX ORDER — LISINOPRIL AND HYDROCHLOROTHIAZIDE 25; 20 MG/1; MG/1
1 TABLET ORAL DAILY
Status: DISCONTINUED | OUTPATIENT
Start: 2022-12-30 | End: 2023-01-02 | Stop reason: HOSPADM

## 2022-12-30 RX ORDER — SODIUM CHLORIDE, SODIUM LACTATE, POTASSIUM CHLORIDE, CALCIUM CHLORIDE 600; 310; 30; 20 MG/100ML; MG/100ML; MG/100ML; MG/100ML
INJECTION, SOLUTION INTRAVENOUS CONTINUOUS
Status: DISCONTINUED | OUTPATIENT
Start: 2022-12-30 | End: 2022-12-30 | Stop reason: HOSPADM

## 2022-12-30 RX ORDER — OXYCODONE HYDROCHLORIDE 5 MG/1
10 TABLET ORAL EVERY 4 HOURS PRN
Status: DISCONTINUED | OUTPATIENT
Start: 2022-12-30 | End: 2023-01-02 | Stop reason: HOSPADM

## 2022-12-30 RX ORDER — HYDROMORPHONE HCL 110MG/55ML
0.5 PATIENT CONTROLLED ANALGESIA SYRINGE INTRAVENOUS EVERY 10 MIN PRN
Status: DISCONTINUED | OUTPATIENT
Start: 2022-12-30 | End: 2022-12-30 | Stop reason: HOSPADM

## 2022-12-30 RX ORDER — POLYETHYLENE GLYCOL 3350 17 G/17G
17 POWDER, FOR SOLUTION ORAL DAILY PRN
Status: DISCONTINUED | OUTPATIENT
Start: 2022-12-30 | End: 2023-01-02 | Stop reason: HOSPADM

## 2022-12-30 RX ORDER — DIPHENHYDRAMINE HYDROCHLORIDE 50 MG/ML
12.5 INJECTION INTRAMUSCULAR; INTRAVENOUS
Status: DISCONTINUED | OUTPATIENT
Start: 2022-12-30 | End: 2022-12-30 | Stop reason: HOSPADM

## 2022-12-30 RX ORDER — MIDAZOLAM HYDROCHLORIDE 2 MG/2ML
2 INJECTION, SOLUTION INTRAMUSCULAR; INTRAVENOUS
Status: COMPLETED | OUTPATIENT
Start: 2022-12-30 | End: 2022-12-30

## 2022-12-30 RX ORDER — ASPIRIN 81 MG/1
81 TABLET, CHEWABLE ORAL ONCE
Status: COMPLETED | OUTPATIENT
Start: 2022-12-30 | End: 2022-12-30

## 2022-12-30 RX ORDER — ONDANSETRON 2 MG/ML
4 INJECTION INTRAMUSCULAR; INTRAVENOUS EVERY 6 HOURS PRN
Status: DISCONTINUED | OUTPATIENT
Start: 2022-12-30 | End: 2023-01-02 | Stop reason: HOSPADM

## 2022-12-30 RX ORDER — MIDAZOLAM HYDROCHLORIDE 2 MG/2ML
2 INJECTION, SOLUTION INTRAMUSCULAR; INTRAVENOUS
Status: DISCONTINUED | OUTPATIENT
Start: 2022-12-30 | End: 2022-12-30 | Stop reason: HOSPADM

## 2022-12-30 RX ORDER — SODIUM CHLORIDE 9 MG/ML
INJECTION, SOLUTION INTRAVENOUS PRN
Status: DISCONTINUED | OUTPATIENT
Start: 2022-12-30 | End: 2022-12-30 | Stop reason: SDUPTHER

## 2022-12-30 RX ORDER — VITAMIN B COMPLEX
1000 TABLET ORAL DAILY
Status: DISCONTINUED | OUTPATIENT
Start: 2022-12-30 | End: 2023-01-02 | Stop reason: HOSPADM

## 2022-12-30 RX ORDER — ONDANSETRON 2 MG/ML
4 INJECTION INTRAMUSCULAR; INTRAVENOUS
Status: DISCONTINUED | OUTPATIENT
Start: 2022-12-30 | End: 2022-12-30 | Stop reason: HOSPADM

## 2022-12-30 RX ORDER — FENTANYL CITRATE 50 UG/ML
100 INJECTION, SOLUTION INTRAMUSCULAR; INTRAVENOUS
Status: DISCONTINUED | OUTPATIENT
Start: 2022-12-30 | End: 2022-12-30 | Stop reason: HOSPADM

## 2022-12-30 RX ORDER — SCOLOPAMINE TRANSDERMAL SYSTEM 1 MG/1
1 PATCH, EXTENDED RELEASE TRANSDERMAL
Status: CANCELLED | OUTPATIENT
Start: 2022-12-30 | End: 2023-01-02

## 2022-12-30 RX ORDER — OXYCODONE HYDROCHLORIDE 5 MG/1
5 TABLET ORAL EVERY 4 HOURS PRN
Status: DISCONTINUED | OUTPATIENT
Start: 2022-12-30 | End: 2023-01-02 | Stop reason: HOSPADM

## 2022-12-30 RX ORDER — OXYCODONE HYDROCHLORIDE 5 MG/1
5 TABLET ORAL
Status: DISCONTINUED | OUTPATIENT
Start: 2022-12-30 | End: 2022-12-30 | Stop reason: HOSPADM

## 2022-12-30 RX ORDER — SCOLOPAMINE TRANSDERMAL SYSTEM 1 MG/1
1 PATCH, EXTENDED RELEASE TRANSDERMAL
Status: DISCONTINUED | OUTPATIENT
Start: 2022-12-30 | End: 2022-12-30 | Stop reason: HOSPADM

## 2022-12-30 RX ORDER — SODIUM CHLORIDE 9 MG/ML
INJECTION, SOLUTION INTRAVENOUS PRN
Status: DISCONTINUED | OUTPATIENT
Start: 2022-12-30 | End: 2022-12-30 | Stop reason: HOSPADM

## 2022-12-30 RX ORDER — MORPHINE SULFATE 2 MG/ML
2 INJECTION, SOLUTION INTRAMUSCULAR; INTRAVENOUS EVERY 4 HOURS PRN
Status: DISCONTINUED | OUTPATIENT
Start: 2022-12-30 | End: 2023-01-02 | Stop reason: HOSPADM

## 2022-12-30 RX ORDER — EPHEDRINE SULFATE/0.9% NACL/PF 50 MG/5 ML
SYRINGE (ML) INTRAVENOUS PRN
Status: DISCONTINUED | OUTPATIENT
Start: 2022-12-30 | End: 2022-12-30 | Stop reason: SDUPTHER

## 2022-12-30 RX ORDER — CETIRIZINE HYDROCHLORIDE 5 MG/1
5 TABLET ORAL DAILY
Status: DISCONTINUED | OUTPATIENT
Start: 2022-12-30 | End: 2023-01-02 | Stop reason: HOSPADM

## 2022-12-30 RX ORDER — FENTANYL CITRATE 50 UG/ML
25 INJECTION, SOLUTION INTRAMUSCULAR; INTRAVENOUS EVERY 5 MIN PRN
Status: DISCONTINUED | OUTPATIENT
Start: 2022-12-30 | End: 2022-12-30 | Stop reason: HOSPADM

## 2022-12-30 RX ORDER — NITROGLYCERIN 0.4 MG/1
0.4 TABLET SUBLINGUAL ONCE AS NEEDED
Status: DISCONTINUED | OUTPATIENT
Start: 2022-12-30 | End: 2023-01-02 | Stop reason: HOSPADM

## 2022-12-30 RX ORDER — ROSUVASTATIN CALCIUM 10 MG/1
40 TABLET, COATED ORAL EVERY EVENING
Status: DISCONTINUED | OUTPATIENT
Start: 2022-12-30 | End: 2023-01-02 | Stop reason: HOSPADM

## 2022-12-30 RX ORDER — SODIUM CHLORIDE 0.9 % (FLUSH) 0.9 %
5-40 SYRINGE (ML) INJECTION PRN
Status: DISCONTINUED | OUTPATIENT
Start: 2022-12-30 | End: 2022-12-30 | Stop reason: HOSPADM

## 2022-12-30 RX ORDER — BUPIVACAINE HYDROCHLORIDE AND EPINEPHRINE 2.5; 5 MG/ML; UG/ML
INJECTION, SOLUTION EPIDURAL; INFILTRATION; INTRACAUDAL; PERINEURAL
Status: COMPLETED | OUTPATIENT
Start: 2022-12-30 | End: 2022-12-30

## 2022-12-30 RX ORDER — SODIUM CHLORIDE 9 MG/ML
INJECTION, SOLUTION INTRAVENOUS PRN
Status: DISCONTINUED | OUTPATIENT
Start: 2022-12-30 | End: 2023-01-02 | Stop reason: HOSPADM

## 2022-12-30 RX ORDER — MIDAZOLAM HYDROCHLORIDE 2 MG/2ML
2 INJECTION, SOLUTION INTRAMUSCULAR; INTRAVENOUS
Status: CANCELLED | OUTPATIENT
Start: 2022-12-30 | End: 2022-12-31

## 2022-12-30 RX ORDER — FLUTICASONE PROPIONATE 50 MCG
2 SPRAY, SUSPENSION (ML) NASAL DAILY
Status: DISCONTINUED | OUTPATIENT
Start: 2022-12-30 | End: 2023-01-02 | Stop reason: HOSPADM

## 2022-12-30 RX ORDER — PROPOFOL 10 MG/ML
INJECTION, EMULSION INTRAVENOUS CONTINUOUS PRN
Status: DISCONTINUED | OUTPATIENT
Start: 2022-12-30 | End: 2022-12-30 | Stop reason: SDUPTHER

## 2022-12-30 RX ORDER — CARVEDILOL 6.25 MG/1
6.25 TABLET ORAL 2 TIMES DAILY WITH MEALS
Status: DISCONTINUED | OUTPATIENT
Start: 2022-12-30 | End: 2023-01-02 | Stop reason: HOSPADM

## 2022-12-30 RX ORDER — SODIUM CHLORIDE, SODIUM LACTATE, POTASSIUM CHLORIDE, CALCIUM CHLORIDE 600; 310; 30; 20 MG/100ML; MG/100ML; MG/100ML; MG/100ML
INJECTION, SOLUTION INTRAVENOUS CONTINUOUS
Status: CANCELLED | OUTPATIENT
Start: 2022-12-30

## 2022-12-30 RX ORDER — MULTIVITAMIN WITH IRON
1 TABLET ORAL DAILY
Status: DISCONTINUED | OUTPATIENT
Start: 2022-12-30 | End: 2023-01-02 | Stop reason: HOSPADM

## 2022-12-30 RX ORDER — PROPOFOL 10 MG/ML
INJECTION, EMULSION INTRAVENOUS PRN
Status: DISCONTINUED | OUTPATIENT
Start: 2022-12-30 | End: 2022-12-30 | Stop reason: SDUPTHER

## 2022-12-30 RX ORDER — METOCLOPRAMIDE HYDROCHLORIDE 5 MG/ML
10 INJECTION INTRAMUSCULAR; INTRAVENOUS
Status: DISCONTINUED | OUTPATIENT
Start: 2022-12-30 | End: 2022-12-30 | Stop reason: HOSPADM

## 2022-12-30 RX ORDER — SODIUM CHLORIDE, SODIUM LACTATE, POTASSIUM CHLORIDE, CALCIUM CHLORIDE 600; 310; 30; 20 MG/100ML; MG/100ML; MG/100ML; MG/100ML
INJECTION, SOLUTION INTRAVENOUS CONTINUOUS
Status: DISCONTINUED | OUTPATIENT
Start: 2022-12-30 | End: 2022-12-30

## 2022-12-30 RX ORDER — SODIUM CHLORIDE 0.9 % (FLUSH) 0.9 %
5-40 SYRINGE (ML) INJECTION EVERY 12 HOURS SCHEDULED
Status: DISCONTINUED | OUTPATIENT
Start: 2022-12-30 | End: 2023-01-02 | Stop reason: HOSPADM

## 2022-12-30 RX ORDER — SODIUM CHLORIDE 0.9 % (FLUSH) 0.9 %
5-40 SYRINGE (ML) INJECTION PRN
Status: DISCONTINUED | OUTPATIENT
Start: 2022-12-30 | End: 2023-01-02 | Stop reason: HOSPADM

## 2022-12-30 RX ORDER — ACETAMINOPHEN 500 MG
1000 TABLET ORAL ONCE
Status: DISCONTINUED | OUTPATIENT
Start: 2022-12-30 | End: 2022-12-30 | Stop reason: HOSPADM

## 2022-12-30 RX ORDER — ACETAMINOPHEN 325 MG/1
650 TABLET ORAL EVERY 4 HOURS PRN
Status: DISCONTINUED | OUTPATIENT
Start: 2022-12-30 | End: 2023-01-02 | Stop reason: HOSPADM

## 2022-12-30 RX ORDER — SODIUM CHLORIDE 0.9 % (FLUSH) 0.9 %
5-40 SYRINGE (ML) INJECTION EVERY 12 HOURS SCHEDULED
Status: CANCELLED | OUTPATIENT
Start: 2022-12-30

## 2022-12-30 RX ORDER — ONDANSETRON 4 MG/1
4 TABLET, ORALLY DISINTEGRATING ORAL EVERY 8 HOURS PRN
Status: DISCONTINUED | OUTPATIENT
Start: 2022-12-30 | End: 2023-01-02 | Stop reason: HOSPADM

## 2022-12-30 RX ORDER — ACETAMINOPHEN 500 MG
1000 TABLET ORAL ONCE
Status: COMPLETED | OUTPATIENT
Start: 2022-12-30 | End: 2022-12-30

## 2022-12-30 RX ORDER — CARVEDILOL 6.25 MG/1
6.25 TABLET ORAL ONCE
Status: COMPLETED | OUTPATIENT
Start: 2022-12-30 | End: 2022-12-30

## 2022-12-30 RX ADMIN — AMPICILLIN AND SULBACTAM 3000 MG: 2; 1 INJECTION, POWDER, FOR SOLUTION INTRAVENOUS at 20:42

## 2022-12-30 RX ADMIN — PROPOFOL 75 MCG/KG/MIN: 10 INJECTION, EMULSION INTRAVENOUS at 09:46

## 2022-12-30 RX ADMIN — ASPIRIN 81 MG: 81 TABLET, CHEWABLE ORAL at 08:35

## 2022-12-30 RX ADMIN — OXYCODONE 10 MG: 5 TABLET ORAL at 22:16

## 2022-12-30 RX ADMIN — Medication 10 MG: at 10:02

## 2022-12-30 RX ADMIN — PROPOFOL 30 MG: 10 INJECTION, EMULSION INTRAVENOUS at 09:46

## 2022-12-30 RX ADMIN — MEPIVACAINE HYDROCHLORIDE 20 ML: 15 INJECTION, SOLUTION EPIDURAL; INFILTRATION at 08:17

## 2022-12-30 RX ADMIN — SODIUM CHLORIDE, PRESERVATIVE FREE 5 ML: 5 INJECTION INTRAVENOUS at 20:44

## 2022-12-30 RX ADMIN — LIDOCAINE HYDROCHLORIDE 40 MG: 20 INJECTION, SOLUTION EPIDURAL; INFILTRATION; INTRACAUDAL; PERINEURAL at 09:46

## 2022-12-30 RX ADMIN — LISINOPRIL AND HYDROCHLOROTHIAZIDE 1 TABLET: 25; 20 TABLET ORAL at 14:20

## 2022-12-30 RX ADMIN — Medication 2000 MG: at 09:53

## 2022-12-30 RX ADMIN — CARVEDILOL 6.25 MG: 6.25 TABLET, FILM COATED ORAL at 08:40

## 2022-12-30 RX ADMIN — CARVEDILOL 6.25 MG: 6.25 TABLET, FILM COATED ORAL at 16:55

## 2022-12-30 RX ADMIN — MORPHINE SULFATE 2 MG: 2 INJECTION, SOLUTION INTRAMUSCULAR; INTRAVENOUS at 15:31

## 2022-12-30 RX ADMIN — CETIRIZINE HYDROCHLORIDE 5 MG: 5 TABLET ORAL at 14:19

## 2022-12-30 RX ADMIN — LATANOPROST 1 DROP: 50 SOLUTION OPHTHALMIC at 20:42

## 2022-12-30 RX ADMIN — BUPIVACAINE HYDROCHLORIDE AND EPINEPHRINE BITARTRATE 10 ML: 2.5; .005 INJECTION, SOLUTION EPIDURAL; INFILTRATION; INTRACAUDAL; PERINEURAL at 08:17

## 2022-12-30 RX ADMIN — FLUTICASONE PROPIONATE 2 SPRAY: 50 SPRAY, METERED NASAL at 14:20

## 2022-12-30 RX ADMIN — AMPICILLIN AND SULBACTAM 3000 MG: 2; 1 INJECTION, POWDER, FOR SOLUTION INTRAVENOUS at 14:20

## 2022-12-30 RX ADMIN — SODIUM CHLORIDE, POTASSIUM CHLORIDE, SODIUM LACTATE AND CALCIUM CHLORIDE: 600; 310; 30; 20 INJECTION, SOLUTION INTRAVENOUS at 08:00

## 2022-12-30 RX ADMIN — ACETAMINOPHEN 1000 MG: 500 TABLET ORAL at 08:23

## 2022-12-30 RX ADMIN — B-COMPLEX W/ C & FOLIC ACID TAB 1 TABLET: TAB at 14:20

## 2022-12-30 RX ADMIN — MIDAZOLAM HYDROCHLORIDE 2 MG: 1 INJECTION, SOLUTION INTRAMUSCULAR; INTRAVENOUS at 08:12

## 2022-12-30 RX ADMIN — OXYCODONE 10 MG: 5 TABLET ORAL at 18:21

## 2022-12-30 RX ADMIN — Medication 10 MG: at 09:50

## 2022-12-30 RX ADMIN — VITAMIN D, TAB 1000IU (100/BT) 1000 UNITS: 25 TAB at 14:19

## 2022-12-30 RX ADMIN — ASPIRIN 81 MG: 81 TABLET ORAL at 14:19

## 2022-12-30 RX ADMIN — ROSUVASTATIN 40 MG: 10 TABLET, FILM COATED ORAL at 16:55

## 2022-12-30 ASSESSMENT — PAIN SCALES - GENERAL
PAINLEVEL_OUTOF10: 8
PAINLEVEL_OUTOF10: 0
PAINLEVEL_OUTOF10: 10
PAINLEVEL_OUTOF10: 0
PAINLEVEL_OUTOF10: 9
PAINLEVEL_OUTOF10: 0
PAINLEVEL_OUTOF10: 0

## 2022-12-30 ASSESSMENT — PAIN - FUNCTIONAL ASSESSMENT
PAIN_FUNCTIONAL_ASSESSMENT: 0-10
PAIN_FUNCTIONAL_ASSESSMENT: PREVENTS OR INTERFERES SOME ACTIVE ACTIVITIES AND ADLS

## 2022-12-30 ASSESSMENT — PAIN DESCRIPTION - LOCATION
LOCATION: HAND
LOCATION: HAND

## 2022-12-30 ASSESSMENT — PAIN DESCRIPTION - ORIENTATION
ORIENTATION: LEFT
ORIENTATION: LEFT

## 2022-12-30 ASSESSMENT — PAIN DESCRIPTION - ONSET: ONSET: GRADUAL

## 2022-12-30 ASSESSMENT — PAIN DESCRIPTION - PAIN TYPE: TYPE: SURGICAL PAIN

## 2022-12-30 ASSESSMENT — PAIN DESCRIPTION - DESCRIPTORS
DESCRIPTORS: ACHING

## 2022-12-30 ASSESSMENT — PAIN DESCRIPTION - FREQUENCY: FREQUENCY: INTERMITTENT

## 2022-12-30 NOTE — PROGRESS NOTES
TRANSFER - OUT REPORT:    Verbal report given to receiving nurse on Roxanne Tineo  being transferred to [address] [place] [address] 50 30 for routine post-op       Report consisted of patient's Situation, Background, Assessment and   Recommendations(SBAR). Information from the following report(s) Adult Overview, Surgery Report, and MAR was reviewed with the receiving nurse. [place] Assessment: No data recorded  Lines:   Peripheral IV 12/30/22 Right Wrist (Active)   Site Assessment Clean, dry & intact 12/30/22 1025   Line Status Infusing 12/30/22 1025   Phlebitis Assessment No symptoms 12/30/22 1025   Infiltration Assessment 0 12/30/22 1025   Alcohol Cap Used No 12/30/22 1025   Dressing Status Clean, dry & intact 12/30/22 1025   Dressing Type Transparent 12/30/22 1025   Dressing Intervention New 12/30/22 1025        Opportunity for questions and clarification was provided.       Patient transported with:  Chart and belongings

## 2022-12-30 NOTE — ANESTHESIA POSTPROCEDURE EVALUATION
Department of Anesthesiology  Postprocedure Note    Patient: Ladi Villegas  MRN: [id number]  YOB: 1935  Date of evaluation: 12/30/2022      Procedure Summary     Date: 12/30/22 Room / Location: [facility] OP OR 02 / [facility]    Anesthesia Start: 0077 Anesthesia Stop: 56    Procedure: Left HAND irrigation and debridement (Left: Hand) Diagnosis:       Cat bite of hand, left, initial encounter      Abscess of hand including fingers, left      (Cat bite of hand, left, initial encounter Pedrito Macadamia)      (Abscess of hand including fingers, left [L02.512])    Surgeons: Gerardo Cuellar MD Responsible Provider: Aimee Booth MD    Anesthesia Type: TIVA ASA Status: 3          Anesthesia Type: No value filed.     Rafat Phase I: Rafat Score: 7    Rafat Phase II:        Anesthesia Post Evaluation    Patient location during evaluation: PACU  Patient participation: complete - patient participated  Level of consciousness: awake and awake and alert  Airway patency: patent  Nausea & Vomiting: no nausea  Complications: no  Cardiovascular status: hemodynamically stable  Respiratory status: acceptable  Hydration status: euvolemic  Multimodal analgesia pain management approach

## 2022-12-30 NOTE — PROGRESS NOTES
TRANSFER - IN REPORT:    Verbal report received from [address] [place] [address] on Delmer Brianna  being received from PACU for routine progression of patient care      Report consisted of patient's Situation, Background, Assessment and   Recommendations(SBAR). Information from the following report(s) Nurse Handoff Report was reviewed with the receiving nurse. Opportunity for questions and clarification was provided. Assessment completed upon patient's arrival to unit and care assumed.

## 2022-12-30 NOTE — OP NOTE
ORTHOPAEDIC SURGICAL NOTE        Cameron Watkins male 82 y.o.  [id number]   12/30/2022     PRE-OP DIAGNOSIS: Cat bite of hand, left, initial encounter [S61.452A, W55.01XA]  Abscess of hand including fingers, left [L02.512]     POST-OP DIAGNOSIS: Cat bite of hand, left, initial encounter [S61.452A, W55.01XA]  Abscess of hand including fingers, left [L02.512]   Left thumb flexor tenosynovitis    LATERALITY: Left       SURGEON:   Aura Echols MD     IMPLANTS:   * No implants in log *   Procedure(s):  Left HAND irrigation and debridement   Surgeon(s):  Kenia Kuhn MD     Procedure(s):  Left HAND abscess irrigation and debridement  Irrigation and debridement of left thumb flexor tendon sheath    ANESTHESIA: Regional     STAFF:    Circulator: Rufina Duong RN  Scrub Person First: William Jane     ESTIMATED BLOOD LOSS: Minimal       TOTAL IV FLUIDS : See anesthesia note    COMPLICATIONS: None     TOURNIQUET TIME:   Total Tourniquet Time Documented:  Arm  (Left) - 13 minutes  Total: Arm  (Left) - 13 minutes       INDICATION FOR PROCEDURE:     Cameron Watkins a cat bite to the left hand on 12/20/2022. He eventually went to the emergency department and was placed on Augmentin. He presented to my office on 12/29/2022, and had had minimal to no improvement of the erythema and pain in his hand. There is obvious purulent drainage from one of the wounds over the dorsum of the thumb. .  Surgical and non-surgical treatment options were discussed with the patient and their family, as well as the risk and benefits of each option. After thorough discussion, the patient decided to proceed with surgical management.   Specific to this treatment plan, we discussed in detail surgical risks including scar, pain, bleeding, infection, anesthetic risks, neurovascular injury, failure to achieve desired results, hardware problems, need for further surgery,  weakness, stiffness, risk of death and potential risk of other unforseen complication. The Patient consented to the procedure after discussion of the risks and benefits. DESCRIPTION OF PROCEDURE:     The patient was identified in the holding room. The left hand was marked and confirmed as the correct operative site. They were then brought to the OR and transferred onto the OR table in the supine position. All bony prominences were well padded. SCDs were placed on the bilateral legs throughout the case. A timeout was performed, verifying the correct patient, the correct side  and the correct procedure. Antibiotics were then administered, and were redosed during the procedure as needed at indicated intervals. A non-sterile tourniquet was placed on the arm. The upper extremity was pre scrubbed and then prepped and draped in routine sterile fashion. An incisional timeout was performed re-confirming the correct patient, surgical site and procedure, as well as verifying antibiotics. The left upper extremity was elevated for exsanguination the tourniquet was inflated to 250 mmHg. I first addressed the wound over the dorsal ulnar aspect of the thumb metacarpophalangeal joint, this was extended proximally and distally, and blunt dissection was carried out through subcutaneous tissue. Purulence was immediately identified, and this was debrided. Cultures were obtained. This portion of the wound did track somewhat over the dorsal aspect of the metacarpophalangeal joint. I next addressed the wound over the radial aspect of the thumb at the level of the metacarpophalangeal joint. This was extended proximally and distally, and purulence was immediately identified. Purulence here did track volarly directly overlying the flexor tendon sheath. It was carefully debrided, and all devitalized skin subcutaneous tissue and fascia was excised bluntly.   The thumb flexor tendon sheath was incised there was small amount of slightly cloudy fluid within the flexor tendon sheath, but no gross purulence within the flexor tendon sheath. The flexor tendon was in good condition. The incision was extended proximally and distally in a Arlet fashion so as to or thoroughly debride the flexor tendon sheath. The sheath was incised, and the wound was copiously irrigated with sterile saline. The tourniquet was deflated and hemostasis was ensured. The wounds were then thoroughly irrigated and closed loosely with 4-0 nylon. A penrose drain was placed into each incision. A sterile dressing was applied. The patient was awakened and taken to PACU in stable condition. All sponge and needle counts were correct at the end of the case. I was present and scrubbed for the entire procedure. DISPOSITION:    Patient will be admitted to the hospital for IV antibiotics, and infectious disease consultation was placed.        Weightbearing status: NWB       CHEMICAL VTE (DVT) PROPHYLAXIS: None warranted for this case       Ky Leiva MD    12/30/22  12:34 PM

## 2022-12-30 NOTE — ANESTHESIA PRE PROCEDURE
Department of Anesthesiology  Preprocedure Note       Name:  Ladi Villegas   Age:  82 y.o.  :  1935                                          MRN:  [id number]         Date:  2022      Surgeon: Mariella Davidson):  Gerardo Cuellar MD    Procedure: Procedure(s):  Left HAND irrigation and debridement, general    Medications prior to admission:   Prior to Admission medications    Medication Sig Start Date End Date Taking? Authorizing Provider   Multiple Vitamins-Minerals (EYE VITAMINS PO) Take by mouth Destiny brand   Yes Historical Provider, MD   Multiple Vitamin (MULTIVITAMIN ADULT PO) Take 1 tablet by mouth daily    Historical Provider, MD   amoxicillin-clavulanate (AUGMENTIN) 875-125 MG per tablet Take 1 tablet by mouth 2 times daily 22   Caren Cabrera MD   amoxicillin-clavulanate (AUGMENTIN) 875-125 MG per tablet Take 1 tablet by mouth 2 times daily for 10 days 22  Ena Najjar Rosebrock, DO   HYDROcodone-acetaminophen (NORCO) 5-325 MG per tablet Take 1 tablet by mouth every 6 hours as needed for Pain for up to 5 days. Intended supply: 5 days.  Take lowest dose possible to manage pain Max Daily Amount: 4 tablets 22  Ena Najjar Rosebrock, DO   fluticasone (FLONASE) 50 MCG/ACT nasal spray ADMINISTER 2 SPRAYS EACH NOSTRIL DAILY 22   Caren Cabrera MD   acetaminophen (TYLENOL) 500 MG tablet Take 1,000 mg by mouth every 6 hours as needed    Ar Automatic Reconciliation   aspirin 81 MG EC tablet Take by mouth daily    Ar Automatic Reconciliation   carvedilol (COREG) 6.25 MG tablet TAKE ONE TABLET BY MOUTH TWICE A DAY WITH MEALS 22   Ar Automatic Reconciliation   vitamin D (CHOLECALCIFEROL) 25 MCG (1000 UT) TABS tablet Take 1,000 Units by mouth    Ar Automatic Reconciliation   fexofenadine (ALLEGRA) 180 MG tablet Take 180 mg by mouth daily    Ar Automatic Reconciliation   latanoprost (XALATAN) 0.005 % ophthalmic solution Place 1 drop into both eyes nightly 8/12/15   Ar Automatic Reconciliation   lisinopril-hydroCHLOROthiazide (PRINZIDE;ZESTORETIC) 20-25 MG per tablet Take 1 tablet by mouth daily 1/25/22   Ar Automatic Reconciliation   nitroGLYCERIN (NITROSTAT) 0.4 MG SL tablet Place 0.4 mg under the tongue every 5 minutes as needed 6/12/19   Ar Automatic Reconciliation   rosuvastatin (CRESTOR) 40 MG tablet Take 40 mg by mouth every evening 1/25/22   Ar Automatic Reconciliation       Current medications:    Current Facility-Administered Medications   Medication Dose Route Frequency Provider Last Rate Last Admin    ceFAZolin (ANCEF) 2000 mg in sterile water 20 mL IV syringe  2,000 mg IntraVENous On Call to Marya Venegas MD        sodium chloride flush 0.9 % injection 5-40 mL  5-40 mL IntraVENous 2 times per day Tessa Atkinson MD        sodium chloride flush 0.9 % injection 5-40 mL  5-40 mL IntraVENous PRN Tessa Atkinson MD        0.9 % sodium chloride infusion   IntraVENous PRN Tessa Atkinson MD           Allergies: Allergies   Allergen Reactions    Sulfamethoxazole-Trimethoprim Rash    Meloxicam Itching       Problem List:    Patient Active Problem List   Diagnosis Code    Encounter for long-term (current) use of medications Z79.899    Obesity E66.9    Arthritis M19.90    Ischemic cardiomyopathy I25.5    Allergic rhinitis J30.9    Chronic bilateral low back pain without sciatica M54.50, G89.29    Prediabetes R73.03    Mediastinal adenopathy R59.0    Dyslipidemia E78.5    Left foot drop M21.372    Coronary artery disease involving native coronary artery of native heart I25.10    Hypertension I10    Macular degeneration H35.30    Cat bite of hand, left, initial encounter S61.452A, W55. 01XA    Abscess of hand including fingers, left L02.512    Antibiotics received only during hospital stay Z78.9       Past Medical History:        Diagnosis Date    Borderline diabetes 10/08/2015    diet controlled    CAD (coronary artery disease) 06/2018 NSTEMI, 2 stents    COVID-19 vaccine series completed 2021    moderna vaccine completed. card scanned under media    Environmental allergies     H/O thromboembolism 4/15/2015    HCAP (healthcare-associated pneumonia) 2018    Hypertension     managed w/med    Ischemic cardiomyopathy 2018    1. LV gram:  EF 40-45% with apical hypokinesis. 2.  Echo (18):  EF 50-55%. No signnificant valve disease.  Macular degeneration     injections in eye, Dr. Ynes Reaves S/P total knee arthroplasty 4/15/2015    left     S/P total knee arthroplasty 4/15/2015    Status post total left knee replacement 2016    Status post total right knee replacement 2016    Status post total right knee replacement 2016    Superficial incisional infection of surgical site 2021    Thromboembolus ([address] 2002    LLE; due to fall       Past Surgical History:        Procedure Laterality Date    CATARACT REMOVAL Right     w/lens implant    COLONOSCOPY      LUMBAR LAMINECTOMY  2021    left L3-4 left L4-5 Dr. Digna Alvarez stenosis    [place] CARDIAC SURG PROCEDURE UNLIST  2018    heart cath 2 stents    TOTAL KNEE ARTHROPLASTY Right 2016    TOTAL KNEE ARTHROPLASTY Left 2015       Social History:    Social History     Tobacco Use    Smoking status: Former     Packs/day: 1.00     Types: Cigarettes     Quit date: 10/1/1960     Years since quittin.2    Smokeless tobacco: Never   Substance Use Topics    Alcohol use:  No                                Counseling given: Not Answered      Vital Signs (Current):   Vitals:    22 1522 22 0738   BP:  (!) 162/76   Pulse:  92   Resp:  18   Temp:  98.3 °F (36.8 °C)   TempSrc:  Oral   SpO2:  97%   Weight: 220 lb (99.8 kg) 220 lb (99.8 kg)   Height: 6' (1.829 m)                                               BP Readings from Last 3 Encounters:   22 (!) 162/76   22 (!) 162/76   08/10/22 122/63 NPO Status:                                                                                 BMI:   Wt Readings from Last 3 Encounters:   12/30/22 220 lb (99.8 kg)   12/29/22 220 lb (99.8 kg)   08/10/22 215 lb (97.5 kg)     Body mass index is 29.84 kg/m². CBC:   Lab Results   Component Value Date/Time    WBC 11.0 12/26/2022 08:50 PM    RBC 4.15 12/26/2022 08:50 PM    HGB 12.5 12/26/2022 08:50 PM    HCT 36.7 12/26/2022 08:50 PM    MCV 88.4 12/26/2022 08:50 PM    RDW 12.7 12/26/2022 08:50 PM     12/26/2022 08:50 PM       CMP:   Lab Results   Component Value Date/Time     12/26/2022 08:50 PM    K 3.3 12/26/2022 08:50 PM    CL 99 12/26/2022 08:50 PM    CO2 23 12/26/2022 08:50 PM    BUN 28 12/26/2022 08:50 PM    CREATININE 1.20 12/26/2022 08:50 PM    GFRAA >60 08/10/2022 11:18 AM    AGRATIO 1.9 07/29/2021 12:07 PM    LABGLOM 59 12/26/2022 08:50 PM    GLUCOSE 125 12/26/2022 08:50 PM    PROT 8.1 12/26/2022 08:50 PM    CALCIUM 9.7 12/26/2022 08:50 PM    BILITOT 0.5 12/26/2022 08:50 PM    ALKPHOS 102 12/26/2022 08:50 PM    ALKPHOS 101 07/29/2021 12:07 PM    AST 44 12/26/2022 08:50 PM    ALT 36 12/26/2022 08:50 PM       POC Tests: No results for input(s): POCGLU, POCNA, POCK, POCCL, POCBUN, POCHEMO, POCHCT in the last 72 hours.     Coags: No results found for: PROTIME, INR, APTT    HCG (If Applicable): No results found for: PREGTESTUR, PREGSERUM, HCG, HCGQUANT     ABGs: No results found for: PHART, PO2ART, IBH0EJI, KBR9KNC, BEART, G4PAMMNV     Type & Screen (If Applicable):  No results found for: LABABO, LABRH    Drug/Infectious Status (If Applicable):  No results found for: HIV, HEPCAB    COVID-19 Screening (If Applicable): No results found for: COVID19        Anesthesia Evaluation  Patient summary reviewed and Nursing notes reviewed  Airway: Mallampati: II  TM distance: >3 FB   Neck ROM: full     Dental:          Pulmonary:Negative Pulmonary ROS and normal exam Cardiovascular:  Exercise tolerance: good (>4 METS),   (+) hypertension:, CAD:, CABG/stent:,         Rhythm: regular  Rate: normal                    Neuro/Psych:   Negative Neuro/Psych ROS              GI/Hepatic/Renal: Neg GI/Hepatic/Renal ROS            Endo/Other: Negative Endo/Other ROS             Pt had no PAT visit       Abdominal:             Vascular: negative vascular ROS. Other Findings:           Anesthesia Plan      TIVA     ASA 3       Induction: intravenous. MIPS: Postoperative opioids intended and Prophylactic antiemetics administered. Anesthetic plan and risks discussed with patient.       Plan discussed with surgical team.          Post-op pain plan if not by surgeon: single peripheral nerve block            Linda Dale MD   12/30/2022

## 2022-12-30 NOTE — ANESTHESIA PROCEDURE NOTES
Peripheral Block    Patient location during procedure: pre-op  Reason for block: post-op pain management and at surgeon's request  Start time: 12/30/2022 8:12 AM  End time: 12/30/2022 8:17 AM  Staffing  Anesthesiologist: Joanna Laguerre MD  Preanesthetic Checklist  Completed: patient identified, IV checked, site marked, risks and benefits discussed, surgical/procedural consents, equipment checked, pre-op evaluation, timeout performed, anesthesia consent given, oxygen available and monitors applied/VS acknowledged  Peripheral Block   Patient position: prone  Prep: ChloraPrep  Provider prep: mask and sterile gloves  Patient monitoring: cardiac monitor, continuous pulse ox, frequent blood pressure checks, IV access, oxygen and responsive to questions  Block type: Axillary  Laterality: left  Injection technique: single-shot  Guidance: ultrasound guided    Needle   Needle type: insulated echogenic nerve stimulator needle   Needle gauge: 22 G  Needle localization: anatomical landmarks and ultrasound guidance  Test dose: negative  Needle length: 4.   Assessment   Injection assessment: negative aspiration for heme, no paresthesia on injection and local visualized surrounding nerve on ultrasound  Slow fractionated injection: yes  Hemodynamics: stable  Real-time US image taken/store: yes  Outcomes: uncomplicated    Medications Administered  bupivacaine-EPINEPHrine PF 0.25% -[id number] - Perineural   10 mL - 12/30/2022 8:17:00 AM  mepivacaine 1.5 % - Perineural   20 mL - 12/30/2022 8:17:00 AM

## 2022-12-31 LAB
BASOPHILS # BLD: 0 K/UL (ref 0–0.2)
BASOPHILS NFR BLD: 0 % (ref 0–2)
DIFFERENTIAL METHOD BLD: ABNORMAL
EOSINOPHIL # BLD: 0.2 K/UL (ref 0–0.8)
EOSINOPHIL NFR BLD: 2 % (ref 0.5–7.8)
ERYTHROCYTE [DISTWIDTH] IN BLOOD BY AUTOMATED COUNT: 12.9 % (ref 11.9–14.6)
HCT VFR BLD AUTO: 29.8 % (ref 41.1–50.3)
HGB BLD-MCNC: 10 G/DL (ref 13.6–17.2)
IMM GRANULOCYTES # BLD AUTO: 0.1 K/UL (ref 0–0.5)
IMM GRANULOCYTES NFR BLD AUTO: 1 % (ref 0–5)
LYMPHOCYTES # BLD: 1.9 K/UL (ref 0.5–4.6)
LYMPHOCYTES NFR BLD: 19 % (ref 13–44)
MCH RBC QN AUTO: 30 PG (ref 26.1–32.9)
MCHC RBC AUTO-ENTMCNC: 33.6 G/DL (ref 31.4–35)
MCV RBC AUTO: 89.5 FL (ref 82–102)
MONOCYTES # BLD: 0.9 K/UL (ref 0.1–1.3)
MONOCYTES NFR BLD: 9 % (ref 4–12)
NEUTS SEG # BLD: 6.9 K/UL (ref 1.7–8.2)
NEUTS SEG NFR BLD: 69 % (ref 43–78)
NRBC # BLD: 0 K/UL (ref 0–0.2)
PLATELET # BLD AUTO: 281 K/UL (ref 150–450)
PMV BLD AUTO: 9.3 FL (ref 9.4–12.3)
RBC # BLD AUTO: 3.33 M/UL (ref 4.23–5.6)
WBC # BLD AUTO: 9.9 K/UL (ref 4.3–11.1)

## 2022-12-31 PROCEDURE — 85025 COMPLETE CBC W/AUTO DIFF WBC: CPT

## 2022-12-31 PROCEDURE — 36415 COLL VENOUS BLD VENIPUNCTURE: CPT

## 2022-12-31 PROCEDURE — 6370000000 HC RX 637 (ALT 250 FOR IP): Performed by: ORTHOPAEDIC SURGERY

## 2022-12-31 PROCEDURE — 6360000002 HC RX W HCPCS: Performed by: ORTHOPAEDIC SURGERY

## 2022-12-31 PROCEDURE — G0378 HOSPITAL OBSERVATION PER HR: HCPCS

## 2022-12-31 PROCEDURE — 96366 THER/PROPH/DIAG IV INF ADDON: CPT

## 2022-12-31 PROCEDURE — 96365 THER/PROPH/DIAG IV INF INIT: CPT

## 2022-12-31 PROCEDURE — 2580000003 HC RX 258: Performed by: ORTHOPAEDIC SURGERY

## 2022-12-31 RX ADMIN — OXYCODONE 10 MG: 5 TABLET ORAL at 14:58

## 2022-12-31 RX ADMIN — SODIUM CHLORIDE, PRESERVATIVE FREE 5 ML: 5 INJECTION INTRAVENOUS at 20:51

## 2022-12-31 RX ADMIN — CARVEDILOL 6.25 MG: 6.25 TABLET, FILM COATED ORAL at 08:56

## 2022-12-31 RX ADMIN — AMPICILLIN AND SULBACTAM 3000 MG: 2; 1 INJECTION, POWDER, FOR SOLUTION INTRAVENOUS at 01:53

## 2022-12-31 RX ADMIN — OXYCODONE 10 MG: 5 TABLET ORAL at 19:15

## 2022-12-31 RX ADMIN — CETIRIZINE HYDROCHLORIDE 5 MG: 5 TABLET ORAL at 08:56

## 2022-12-31 RX ADMIN — AMPICILLIN AND SULBACTAM 3000 MG: 2; 1 INJECTION, POWDER, FOR SOLUTION INTRAVENOUS at 08:30

## 2022-12-31 RX ADMIN — AMPICILLIN AND SULBACTAM 3000 MG: 2; 1 INJECTION, POWDER, FOR SOLUTION INTRAVENOUS at 20:50

## 2022-12-31 RX ADMIN — LISINOPRIL AND HYDROCHLOROTHIAZIDE 1 TABLET: 25; 20 TABLET ORAL at 08:56

## 2022-12-31 RX ADMIN — CARVEDILOL 6.25 MG: 6.25 TABLET, FILM COATED ORAL at 16:21

## 2022-12-31 RX ADMIN — AMPICILLIN AND SULBACTAM 3000 MG: 2; 1 INJECTION, POWDER, FOR SOLUTION INTRAVENOUS at 14:30

## 2022-12-31 RX ADMIN — LATANOPROST 1 DROP: 50 SOLUTION OPHTHALMIC at 20:50

## 2022-12-31 RX ADMIN — VITAMIN D, TAB 1000IU (100/BT) 1000 UNITS: 25 TAB at 08:57

## 2022-12-31 RX ADMIN — ASPIRIN 81 MG: 81 TABLET ORAL at 08:56

## 2022-12-31 RX ADMIN — ROSUVASTATIN 40 MG: 10 TABLET, FILM COATED ORAL at 16:21

## 2022-12-31 RX ADMIN — SODIUM CHLORIDE, PRESERVATIVE FREE 5 ML: 5 INJECTION INTRAVENOUS at 08:57

## 2022-12-31 RX ADMIN — B-COMPLEX W/ C & FOLIC ACID TAB 1 TABLET: TAB at 08:57

## 2022-12-31 RX ADMIN — FLUTICASONE PROPIONATE 2 SPRAY: 50 SPRAY, METERED NASAL at 08:58

## 2022-12-31 ASSESSMENT — PAIN DESCRIPTION - PAIN TYPE: TYPE: SURGICAL PAIN

## 2022-12-31 ASSESSMENT — PAIN SCALES - GENERAL
PAINLEVEL_OUTOF10: 9
PAINLEVEL_OUTOF10: 8
PAINLEVEL_OUTOF10: 0

## 2022-12-31 ASSESSMENT — PAIN DESCRIPTION - ORIENTATION
ORIENTATION: LEFT
ORIENTATION: LEFT

## 2022-12-31 ASSESSMENT — PAIN DESCRIPTION - LOCATION
LOCATION: HAND
LOCATION: HAND

## 2022-12-31 ASSESSMENT — PAIN - FUNCTIONAL ASSESSMENT: PAIN_FUNCTIONAL_ASSESSMENT: ACTIVITIES ARE NOT PREVENTED

## 2022-12-31 ASSESSMENT — PAIN DESCRIPTION - ONSET: ONSET: GRADUAL

## 2022-12-31 ASSESSMENT — PAIN DESCRIPTION - FREQUENCY: FREQUENCY: INTERMITTENT

## 2022-12-31 ASSESSMENT — PAIN DESCRIPTION - DESCRIPTORS
DESCRIPTORS: ACHING
DESCRIPTORS: ACHING

## 2022-12-31 NOTE — CONSULTS
Infectious Disease Consult    Today's Date: 12/31/2022   Admit Date: 12/30/2022    Impression:   Cat bite with complicated infection of left thumb involving tenosynovitis and with purulence at least adjacent to the MCP joint. Status post I&D on 12/30/2022. Cultures pending. Patient bitten on 12/20/2022 and failed outpatient Augmentin. Plan: At this point his thumb has been debrided and is currently in a large dressing. I did not take this down as he is within 24 hours postop. We will need to wait on cultures and assess his response to the IV Unasyn alone. He will need to stay in-house at least until tomorrow if not until Monday depending on culture results. If it is not felt he is improving in the postop setting with IV Unasyn alone we may consider adding doxycycline for empiric Bartonella therapy  I am hoping we can avoid IV antibiotics after discharge but we will have to assess his response  ID will continue to follow    Anti-infectives:   Unasyn initiated 12/30/2022  Augmentin from 12/25/2022 until admission  Apparently received 1 dose of clindamycin on 12/25/2022 as well    Subjective:   Date of Consultation:  December 31, 2022  Referring Physician: Dr. Simin Nichols with orthopedics    Patient is a 82 y.o. male Who was admitted on 12/30/2022 after sustaining a cat bite to his left hand on 12/20/2022. The cat is owned by the patient and has been up-to-date on shots including rabies. His wife reports that [facility] actually called and checked on this. He did receive tetanus vaccine when assessed in the emergency department per the wife. He did not seek care until approximately 12/25/2022 at which time he received a dose of IV clindamycin and was sent home on oral Augmentin. He was seen by orthopedics later, noted to have purulent drainage from the wound, was admitted, and went to the OR on 12/30/2022. Operative report notes purulence at the dorsal aspect of the MCP joint of the left thumb.   From the radial aspect of the thumb wound, there was purulence again identified in this tract over the flexor tendon sheath. There was no gross purulence within the flexor tendon sheath. Patient reports his thumb hurt in the postop setting but today it is feeling better. He denies fevers prior to admission. Aerobic and anaerobic cultures are pending. Patient Active Problem List   Diagnosis    Encounter for long-term (current) use of medications    Obesity    Arthritis    Ischemic cardiomyopathy    Allergic rhinitis    Chronic bilateral low back pain without sciatica    Prediabetes    Mediastinal adenopathy    Dyslipidemia    Left foot drop    Coronary artery disease involving native coronary artery of native heart    Hypertension    Macular degeneration    Cat bite of hand, left, initial encounter    Abscess of hand including fingers, left    Antibiotics received only during hospital stay    Abscess, hand     Past Medical History:   Diagnosis Date    Borderline diabetes 10/08/2015    diet controlled    CAD (coronary artery disease) 06/2018    NSTEMI, 2 stents    COVID-19 vaccine series completed 03/04/2021    moderna vaccine completed. card scanned under media    Environmental allergies     H/O thromboembolism 4/15/2015    HCAP (healthcare-associated pneumonia) 6/13/2018    Hypertension     managed w/med    Ischemic cardiomyopathy 6/1/2018    1. LV gram:  EF 40-45% with apical hypokinesis. 2.  Echo (6/2/18):  EF 50-55%. No signnificant valve disease.      Macular degeneration     injections in eye, Dr. [facility]    Osteoarthritis     S/P total knee arthroplasty 4/15/2015    left     S/P total knee arthroplasty 4/15/2015    Status post total left knee replacement 1/6/2016    Status post total right knee replacement 1/11/2016    Status post total right knee replacement 1/11/2016    Superficial incisional infection of surgical site 08/09/2021    Thromboembolus ([address] 8/2002    LLE; due to fall Family History   Problem Relation Age of Onset    Hypertension Mother     Heart Disease Father     Cancer Mother     Diabetes Father       Social History     Tobacco Use    Smoking status: Former     Packs/day: 1.00     Types: Cigarettes     Quit date: 10/1/1960     Years since quittin.2    Smokeless tobacco: Never   Substance Use Topics    Alcohol use: No     Past Surgical History:   Procedure Laterality Date    CATARACT REMOVAL Right     w/lens implant    COLONOSCOPY      HAND SURGERY Left 2022    Left HAND irrigation and debridement performed by Adonis Irizarry MD at [place] [address]  2021    left L3-4 left L4-5 Dr. Maggie Monte stenosis    [facility]  2018    heart cath 2 stents    TOTAL KNEE ARTHROPLASTY Right 2016    TOTAL KNEE ARTHROPLASTY Left 2015      Prior to Admission medications    Medication Sig Start Date End Date Taking? Authorizing Provider   Multiple Vitamins-Minerals (EYE VITAMINS PO) Take by mouth Destiny brand   Yes Historical Provider, MD   Multiple Vitamin (MULTIVITAMIN ADULT PO) Take 1 tablet by mouth daily    Historical Provider, MD   amoxicillin-clavulanate (AUGMENTIN) 875-125 MG per tablet Take 1 tablet by mouth 2 times daily 22   Harriet Peres MD   amoxicillin-clavulanate (AUGMENTIN) 875-125 MG per tablet Take 1 tablet by mouth 2 times daily for 10 days 22  Rick Swenson DO   HYDROcodone-acetaminophen (NORCO) 5-325 MG per tablet Take 1 tablet by mouth every 6 hours as needed for Pain for up to 5 days. Intended supply: 5 days.  Take lowest dose possible to manage pain Max Daily Amount: 4 tablets 22  Rick Swenson DO   fluticasone (FLONASE) 50 MCG/ACT nasal spray ADMINISTER 2 SPRAYS EACH NOSTRIL DAILY 22   Harriet Peres MD   acetaminophen (TYLENOL) 500 MG tablet Take 1,000 mg by mouth every 6 hours as needed    Ar Automatic Reconciliation   aspirin 81 MG EC tablet Take by mouth daily    Ar Automatic Reconciliation   carvedilol (COREG) 6.25 MG tablet TAKE ONE TABLET BY MOUTH TWICE A DAY WITH MEALS 22   Ar Automatic Reconciliation   vitamin D (CHOLECALCIFEROL) 25 MCG (1000 UT) TABS tablet Take 1,000 Units by mouth    Ar Automatic Reconciliation   fexofenadine (ALLEGRA) 180 MG tablet Take 180 mg by mouth daily    Ar Automatic Reconciliation   latanoprost (XALATAN) 0.005 % ophthalmic solution Place 1 drop into both eyes nightly 8/12/15   Ar Automatic Reconciliation   lisinopril-hydroCHLOROthiazide (PRINZIDE;ZESTORETIC) 20-25 MG per tablet Take 1 tablet by mouth daily 22   Ar Automatic Reconciliation   nitroGLYCERIN (NITROSTAT) 0.4 MG SL tablet Place 0.4 mg under the tongue every 5 minutes as needed 19   Ar Automatic Reconciliation   rosuvastatin (CRESTOR) 40 MG tablet Take 40 mg by mouth every evening 22   Ar Automatic Reconciliation       Allergies   Allergen Reactions    Sulfamethoxazole-Trimethoprim Rash    Meloxicam Itching        Review of Systems:  A comprehensive review of systems is negative except as stated in the HPI. Objective:     Visit Vitals  BP (!) 121/56   Pulse 70   Temp 98.4 °F (36.9 °C) (Oral)   Resp 18   Ht 6' (1.829 m)   Wt 220 lb (99.8 kg)   SpO2 96%   BMI 29.84 kg/m²     Temp (24hrs), Av °F (36.7 °C), Min:97 °F (36.1 °C), Max:98.4 °F (36.9 °C)       Lines:  Peripheral IV:       Physical Exam:    General:  Alert, cooperative, well noursished, well developed, appears stated age   Lungs:   Clear to auscultation bilaterally, good effort   CV:  Regular rate and rhythm,no murmur, click, rub or gallop   Abdomen:   Soft, non-tender.  bowel sounds normal. non-distended   Extremities: Left hand and large dressing but patient with movement of all fingers   Skin: Skin color, texture, turgor normal. no acute rash or lesions   Musculoskeletal: As above   Lines/Devices:  Intact, no erythema, drainage or tenderness   Psych: Alert and oriented, normal mood affect given the setting       Data Review:     CBC:  Recent Labs     12/31/22  0510   WBC 9.9   HGB 10.0*   HCT 29.8*          BMP:  No results for input(s): CREA, BUN, NA, K, CL, CO2, AGAP, GLU in the last 72 hours. LFTS:  No results for input(s): ALT, TP, ALB in the last 72 hours. Invalid input(s): TBILI, SGOT, AP    Microbiology:   Reviewed    Imaging:   Reviewed    Signed By: Janell Linares.  MD Dian     December 31, 2022

## 2023-01-01 PROCEDURE — 6370000000 HC RX 637 (ALT 250 FOR IP): Performed by: ORTHOPAEDIC SURGERY

## 2023-01-01 PROCEDURE — 2580000003 HC RX 258: Performed by: ORTHOPAEDIC SURGERY

## 2023-01-01 PROCEDURE — 6360000002 HC RX W HCPCS: Performed by: ORTHOPAEDIC SURGERY

## 2023-01-01 PROCEDURE — 96366 THER/PROPH/DIAG IV INF ADDON: CPT

## 2023-01-01 PROCEDURE — G0378 HOSPITAL OBSERVATION PER HR: HCPCS

## 2023-01-01 RX ORDER — OXYCODONE HYDROCHLORIDE 5 MG/1
5 TABLET ORAL EVERY 4 HOURS PRN
Qty: 18 TABLET | Refills: 0 | Status: SHIPPED | OUTPATIENT
Start: 2023-01-01 | End: 2023-01-04

## 2023-01-01 RX ORDER — AMOXICILLIN AND CLAVULANATE POTASSIUM 875; 125 MG/1; MG/1
1 TABLET, FILM COATED ORAL 2 TIMES DAILY
Qty: 14 TABLET | Refills: 0 | Status: SHIPPED | OUTPATIENT
Start: 2023-01-01 | End: 2023-01-15

## 2023-01-01 RX ADMIN — LATANOPROST 1 DROP: 50 SOLUTION OPHTHALMIC at 20:47

## 2023-01-01 RX ADMIN — VITAMIN D, TAB 1000IU (100/BT) 1000 UNITS: 25 TAB at 08:54

## 2023-01-01 RX ADMIN — OXYCODONE 10 MG: 5 TABLET ORAL at 20:53

## 2023-01-01 RX ADMIN — CARVEDILOL 6.25 MG: 6.25 TABLET, FILM COATED ORAL at 16:55

## 2023-01-01 RX ADMIN — OXYCODONE 10 MG: 5 TABLET ORAL at 08:54

## 2023-01-01 RX ADMIN — ASPIRIN 81 MG: 81 TABLET ORAL at 08:54

## 2023-01-01 RX ADMIN — FLUTICASONE PROPIONATE 2 SPRAY: 50 SPRAY, METERED NASAL at 09:00

## 2023-01-01 RX ADMIN — AMPICILLIN AND SULBACTAM 3000 MG: 2; 1 INJECTION, POWDER, FOR SOLUTION INTRAVENOUS at 02:11

## 2023-01-01 RX ADMIN — SODIUM CHLORIDE, PRESERVATIVE FREE 5 ML: 5 INJECTION INTRAVENOUS at 09:00

## 2023-01-01 RX ADMIN — CARVEDILOL 6.25 MG: 6.25 TABLET, FILM COATED ORAL at 08:54

## 2023-01-01 RX ADMIN — AMPICILLIN AND SULBACTAM 3000 MG: 2; 1 INJECTION, POWDER, FOR SOLUTION INTRAVENOUS at 14:33

## 2023-01-01 RX ADMIN — ACETAMINOPHEN 650 MG: 325 TABLET ORAL at 22:01

## 2023-01-01 RX ADMIN — CETIRIZINE HYDROCHLORIDE 5 MG: 5 TABLET ORAL at 08:54

## 2023-01-01 RX ADMIN — AMPICILLIN AND SULBACTAM 3000 MG: 2; 1 INJECTION, POWDER, FOR SOLUTION INTRAVENOUS at 20:47

## 2023-01-01 RX ADMIN — ROSUVASTATIN 40 MG: 10 TABLET, FILM COATED ORAL at 16:55

## 2023-01-01 RX ADMIN — AMPICILLIN AND SULBACTAM 3000 MG: 2; 1 INJECTION, POWDER, FOR SOLUTION INTRAVENOUS at 08:54

## 2023-01-01 RX ADMIN — B-COMPLEX W/ C & FOLIC ACID TAB 1 TABLET: TAB at 08:54

## 2023-01-01 RX ADMIN — SODIUM CHLORIDE, PRESERVATIVE FREE 5 ML: 5 INJECTION INTRAVENOUS at 20:47

## 2023-01-01 RX ADMIN — LISINOPRIL AND HYDROCHLOROTHIAZIDE 1 TABLET: 25; 20 TABLET ORAL at 08:54

## 2023-01-01 ASSESSMENT — PAIN DESCRIPTION - DESCRIPTORS
DESCRIPTORS: ACHING
DESCRIPTORS: ACHING

## 2023-01-01 ASSESSMENT — PAIN DESCRIPTION - LOCATION
LOCATION: HAND
LOCATION: HAND

## 2023-01-01 ASSESSMENT — PAIN - FUNCTIONAL ASSESSMENT: PAIN_FUNCTIONAL_ASSESSMENT: ACTIVITIES ARE NOT PREVENTED

## 2023-01-01 ASSESSMENT — PAIN SCALES - GENERAL
PAINLEVEL_OUTOF10: 0
PAINLEVEL_OUTOF10: 7
PAINLEVEL_OUTOF10: 0
PAINLEVEL_OUTOF10: 3
PAINLEVEL_OUTOF10: 7

## 2023-01-01 ASSESSMENT — PAIN DESCRIPTION - ONSET: ONSET: GRADUAL

## 2023-01-01 ASSESSMENT — PAIN DESCRIPTION - ORIENTATION
ORIENTATION: LEFT
ORIENTATION: LEFT

## 2023-01-01 ASSESSMENT — PAIN DESCRIPTION - FREQUENCY: FREQUENCY: INTERMITTENT

## 2023-01-01 ASSESSMENT — PAIN DESCRIPTION - PAIN TYPE: TYPE: SURGICAL PAIN

## 2023-01-01 NOTE — DISCHARGE INSTRUCTIONS
[facility]        Patient Discharge Instructions    Roxann Varghese / [id number] : 1935    Admitted 2022 Discharged: 2023     IF YOU HAVE ANY PROBLEMS ONCE YOU ARE  [address] FOLLOWING NUMBERS:   Main office number: [phone] ask for [facility] Address: Delonte Wren Kanu Schaefer [address], [address] [place] [address]          Activity  As tolerated and as directed by your doctor. Keep dressing dry and clean  Dry dressing changes daily       Diet  Resume regular diet       Medications    The medications you are to continue on are listed on the medication reconciliation sheet. Narcotic pain medications as well as supplemental iron can cause constipation. If this occurs try stopping the narcotic pain medication and/or the iron. It is important that you take the medication exactly as they are prescribed. Keep your medication in the bottles provided by the pharmacist and keep a list of the medication names, dosages, and times to be taken in your wallet. Do not take other medications without consulting your doctor. Other Important Information    Do NOT smoke as this will greatly increase your risk of infection! Do not drive and operate hazardous machinery until being cleared to do so by your doctor     [facility] should be arranged for dressing changes. If you do not hear from them within two days of leaving the hospital please call the office. Patients should not drive if they are still taking narcotic pain mediation during the daytime hours. Most patients wean themselves off of pain medication within 2-5 weeks after surgery. Please give a list of your current medications to your Primary Care Provider and update this list whenever your medications are discontinued, doses are changed, or new medications (including over-the-counter products) are added. Please carry medication information at all times in case of emergency situations.     If you have sleep apnea and have a CPAP machine, please use it for all naps and sleeping. When to Call the office    - If you have a temperature greater then 101  - Excessive bleeding that does not stop after holding pressure over the area  - Excessive redness, swelling or bruising, and/ or green or yellow, smelly discharge from incision  - Uncontrolled vomiting   - Loose control of your bladder or bowel function  - Need a pain medication refill   - Need to change your follow up appointment     Information obtained by :  I understand that if any problems occur once I am at home I am to contact my physician. I understand and acknowledge receipt of the instructions indicated above.                                                                                                                                            Physician's or R.N.'s Signature                                                                  Date/Time                                                                                                                                              Patient or Representative Signature                                                          Date/Time

## 2023-01-01 NOTE — PROGRESS NOTES
Infectious Disease Progress Note    Today's Date: 2023   Admit Date: 2022    Impression:   Left thenar tenosynovitis / MCP joint septic arthritis following cat bite ~   S/p I&D, 22; Cx: NGTD    Plan:   Most likely, he failed outpatient therapy because he needed source control with surgery and not because the antibiotics weren't effective. Continue IV unasyn overnight. If operative cultures are not growing by tomorrow, would discharge on oral Augmentin for an additional 4 weeks of therapy. Please contact ID tomorrow if wound appears to be worsening or if operative cultures are growing an organism. Outpatient plan will be for him to have office follow up with hand surgery alone and ID evaluation as needed if above plan is not working. Anti-infectives:   IV unasyn    Subjective: Interval history: afebrile; feels well; moving fingers; bandage taken down and drains removed today; Allergies   Allergen Reactions    Sulfamethoxazole-Trimethoprim Rash    Meloxicam Itching        Review of Systems:  A comprehensive review of systems is negative except as stated above. Objective:     Visit Vitals  BP (!) 117/49   Pulse 83   Temp 98.2 °F (36.8 °C)   Resp 17   Ht 6' (1.829 m)   Wt 220 lb (99.8 kg)   SpO2 95%   BMI 29.84 kg/m²     Temp (24hrs), Av.8 °F (37.1 °C), Min:98.1 °F (36.7 °C), Max:99.7 °F (37.6 °C)       Lines:  Peripheral IV:       Physical Exam:    General:  In no acute distress   Eyes:     Mouth/Throat: OP clear   Neck:    Lungs:   Breathing comfortably   CV:     Abdomen:      Extremities: L hand edema at surgical site   Skin: No rash   Lymph nodes:    Musculoskeletal: L hand wound bandaged   Lines/Devices:     Psych: Appropriate        Data Review:     CBC:  Recent Labs     22  0510   WBC 9.9   HGB 10.0*   HCT 29.8*          BMP:  No results for input(s): CREA, BUN, NA, K, CL, CO2, AGAP, GLU in the last 72 hours.     LFTS:  No results for input(s): ALT, TP, ALB in the last 72 hours.     Invalid input(s): TOMI JENNINGS, AP    Microbiology:   Reviewed    Imaging:   Reviewed    Signed By: Karli Rebollar MD     January 1, 2023

## 2023-01-01 NOTE — CARE COORDINATION
Pt chart reviewed for discharge planning. LMSW consulted for home health care/wound care and possible IV abx. Received update that pt will be able to transition home on oral abx. Discussed referral to [facility]. Pt/family chose referral to [facility] for follow up home care. Pt normally manages his ADL's and will return home with family support. Discharge is anticipated for tomorrow, 1/2/2023. Will place [facility] referral as ordered. CM will continue to follow pt plan of care and assist further with supportive care referrals pending pt clinical progress. Please consult case management if additional needs arise. 01/01/23 1316   Service Assessment   Patient Orientation Alert and Oriented   Cognition Alert   History Provided By Patient;Spouse;Medical Record   Primary Caregiver Self   Accompanied By/Relationship spouse, son   Support Systems Spouse/Significant Other;Children   Patient's Healthcare Decision Maker is: Legal Next of Kin   PCP Verified by CM Yes   Last Visit to PCP Within last 3 months   Prior Functional Level Independent in ADLs/IADLs   Current Functional Level Independent in ADLs/IADLs   Can patient return to prior living arrangement Yes   Ability to make needs known: Good   Family able to assist with home care needs: Yes   Would you like for me to discuss the discharge plan with any other family members/significant others, and if so, who? No   Social/Functional History   Lives With Spouse; Son   Type of [address] [place] [address] Help From Family   ADL Assistance Independent   Ambulation Assistance Independent   Active  Yes   Mode of Transportation Car   Occupation Retired   Discharge Planning   Living Arrangements Spouse/Significant Other   Current Services Prior To Admission None   Potential Assistance Needed N/A   DME Ordered?  No   Potential Assistance Purchasing Medications No   Type of Home Care Services Nursing Services   Patient expects to be discharged to: [facility] Discharge Transition of Care Consult (CM Consult) Home Health;Discharge Planning   [facility] Provided? No   Mode of Transport at Discharge Other (see comment)  (family)   Confirm Follow Up Transport Family   Condition of Participation: Discharge Planning   The Plan for Transition of Care is related to the following treatment goals: Pt will need home health care for recovery to return to his functional baseline. The Patient and/or Patient Representative was provided with a Choice of Provider? Patient;Patient Representative   Name of the Patient Representative who was provided with the Choice of Provider and agrees with the Discharge Plan?  son   The Patient and/Or Patient Representative agree with the Discharge Plan? Yes   Freedom of Choice list was provided with basic dialogue that supports the patient's individualized plan of care/goals, treatment preferences, and shares the quality data associated with the providers?   Yes

## 2023-01-01 NOTE — PROGRESS NOTES
[facility]        2023         Post Op day: 2 Days Post-Op Procedure(s) (LRB):  Left HAND irrigation and debridement (Left)      Admit Date: 2022  Admit Diagnosis: Cat bite of hand, left, initial encounter [S61.452A, W55.01XA]  Abscess of hand including fingers, left [L02.512]  Antibiotics received only during hospital stay [Z78.9]  Abscess, hand [L02.519]       Principle Problem: Antibiotics received only during hospital stay. Subjective: Doing well, No complaints, No SOB, No Chest Pain, No Nausea or Vomiting     Objective:   Vital Signs are Stable, No Acute Distress, Alert,  Dressing is Dry,  Neurovascular exam is normal.     Assessment / Plan :  Patient Active Problem List   Diagnosis    Encounter for long-term (current) use of medications    Obesity    Arthritis    Ischemic cardiomyopathy    Allergic rhinitis    Chronic bilateral low back pain without sciatica    Prediabetes    Mediastinal adenopathy    Dyslipidemia    Left foot drop    Coronary artery disease involving native coronary artery of native heart    Hypertension    Macular degeneration    Cat bite of hand, left, initial encounter    Abscess of hand including fingers, left    Antibiotics received only during hospital stay    Abscess, hand    Patient Vitals for the past 8 hrs:   BP Temp Temp src Pulse Resp SpO2   23 0823 127/63 98.1 °F (36.7 °C) -- 99 18 95 %   23 0549 134/60 98.8 °F (37.1 °C) Oral 95 18 94 %   23 0159 (!) 144/71 99.7 °F (37.6 °C) Oral (!) 106 18 91 %    Temp (24hrs), Av.9 °F (37.2 °C), Min:98.1 °F (36.7 °C), Max:99.7 °F (37.6 °C)    Body mass index is 29.84 kg/m². Lab Results   Component Value Date/Time    HGB 10.0 2022 05:10 AM          S/P Procedure(s) (LRB):  Left HAND irrigation and debridement (Left): Cat bite    I removed the two penrose drains and redressed the wound. No ascending redness. No wrist pain. Good perfusion of all fingers.      Antibiotics per ID: awaiting cultures and final treatment plan per ID  Continue PT  Fall Precautions  [facility] if antibiotic plan finalized   F/U: 2 weeks postop for wound check and suture removal with Dr. Peng Signs By: YONNY Liang, PA  1/1/2023,  9:49 AM

## 2023-01-01 NOTE — PROGRESS NOTES
Orthopedic Update:     Spoke with infectious disease. Plan will be for outpatient treatment with augmentin. Patient may follow up with them on an as needed basis.

## 2023-01-02 ENCOUNTER — HOME HEALTH ADMISSION (OUTPATIENT)
Dept: HOME HEALTH SERVICES | Facility: HOME HEALTH | Age: 88
End: 2023-01-02
Payer: MEDICARE

## 2023-01-02 VITALS
DIASTOLIC BLOOD PRESSURE: 68 MMHG | HEART RATE: 73 BPM | RESPIRATION RATE: 20 BRPM | SYSTOLIC BLOOD PRESSURE: 111 MMHG | BODY MASS INDEX: 29.8 KG/M2 | OXYGEN SATURATION: 96 % | HEIGHT: 72 IN | TEMPERATURE: 98.2 F | WEIGHT: 220 LBS
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
## 2023-01-02 PROBLEM — S61.452S: Status: ACTIVE | Noted: 2023-01-02

## 2023-01-02 LAB
BACTERIA SPEC CULT: NORMAL
CRP SERPL-MCNC: 11.5 MG/DL (ref 0–0.9)
GLUCOSE BLD STRIP.AUTO-MCNC: 117 MG/DL (ref 65–100)
GRAM STN SPEC: NORMAL
GRAM STN SPEC: NORMAL
SERVICE CMNT-IMP: ABNORMAL
SERVICE CMNT-IMP: NORMAL

## 2023-01-02 PROCEDURE — 6360000002 HC RX W HCPCS: Performed by: ORTHOPAEDIC SURGERY

## 2023-01-02 PROCEDURE — 6370000000 HC RX 637 (ALT 250 FOR IP): Performed by: ORTHOPAEDIC SURGERY

## 2023-01-02 PROCEDURE — 82962 GLUCOSE BLOOD TEST: CPT

## 2023-01-02 PROCEDURE — 1100000000 HC RM PRIVATE

## 2023-01-02 PROCEDURE — 96366 THER/PROPH/DIAG IV INF ADDON: CPT

## 2023-01-02 PROCEDURE — G0378 HOSPITAL OBSERVATION PER HR: HCPCS

## 2023-01-02 PROCEDURE — 2580000003 HC RX 258: Performed by: ORTHOPAEDIC SURGERY

## 2023-01-02 PROCEDURE — 36415 COLL VENOUS BLD VENIPUNCTURE: CPT

## 2023-01-02 PROCEDURE — 86140 C-REACTIVE PROTEIN: CPT

## 2023-01-02 RX ADMIN — CETIRIZINE HYDROCHLORIDE 5 MG: 5 TABLET ORAL at 09:24

## 2023-01-02 RX ADMIN — AMPICILLIN AND SULBACTAM 3000 MG: 2; 1 INJECTION, POWDER, FOR SOLUTION INTRAVENOUS at 02:06

## 2023-01-02 RX ADMIN — OXYCODONE 10 MG: 5 TABLET ORAL at 07:30

## 2023-01-02 RX ADMIN — SODIUM CHLORIDE, PRESERVATIVE FREE 10 ML: 5 INJECTION INTRAVENOUS at 09:25

## 2023-01-02 RX ADMIN — AMPICILLIN AND SULBACTAM 3000 MG: 2; 1 INJECTION, POWDER, FOR SOLUTION INTRAVENOUS at 09:23

## 2023-01-02 RX ADMIN — VITAMIN D, TAB 1000IU (100/BT) 1000 UNITS: 25 TAB at 09:24

## 2023-01-02 RX ADMIN — OXYCODONE 10 MG: 5 TABLET ORAL at 12:16

## 2023-01-02 RX ADMIN — CARVEDILOL 6.25 MG: 6.25 TABLET, FILM COATED ORAL at 09:24

## 2023-01-02 RX ADMIN — ASPIRIN 81 MG: 81 TABLET ORAL at 09:24

## 2023-01-02 RX ADMIN — LISINOPRIL AND HYDROCHLOROTHIAZIDE 1 TABLET: 25; 20 TABLET ORAL at 09:24

## 2023-01-02 RX ADMIN — B-COMPLEX W/ C & FOLIC ACID TAB 1 TABLET: TAB at 09:24

## 2023-01-02 ASSESSMENT — PAIN DESCRIPTION - ONSET: ONSET: ON-GOING

## 2023-01-02 ASSESSMENT — PAIN DESCRIPTION - DESCRIPTORS
DESCRIPTORS: ACHING;THROBBING
DESCRIPTORS: ACHING;THROBBING

## 2023-01-02 ASSESSMENT — PAIN SCALES - GENERAL
PAINLEVEL_OUTOF10: 7
PAINLEVEL_OUTOF10: 7
PAINLEVEL_OUTOF10: 2
PAINLEVEL_OUTOF10: 2

## 2023-01-02 ASSESSMENT — PAIN DESCRIPTION - ORIENTATION
ORIENTATION: LEFT
ORIENTATION: LEFT

## 2023-01-02 ASSESSMENT — PAIN DESCRIPTION - LOCATION
LOCATION: HAND
LOCATION: HAND

## 2023-01-02 ASSESSMENT — PAIN DESCRIPTION - FREQUENCY
FREQUENCY: INTERMITTENT
FREQUENCY: INTERMITTENT

## 2023-01-02 ASSESSMENT — PAIN DESCRIPTION - PAIN TYPE
TYPE: ACUTE PAIN;SURGICAL PAIN
TYPE: SURGICAL PAIN

## 2023-01-02 NOTE — CARE COORDINATION
Pt was medically cleared for dc to home today [facility]. No other dc needs or concerns identified or reported. 01/01/23 1316   Service Assessment   Patient Orientation Alert and Oriented   Cognition Alert   History Provided By Patient;Spouse;Medical Record   Primary Caregiver Self   Accompanied By/Relationship spouse, son   Support Systems Spouse/Significant Other;Children   Patient's Healthcare Decision Maker is: Legal Next of Kin   PCP Verified by CM Yes   Last Visit to PCP Within last 3 months   Prior Functional Level Independent in ADLs/IADLs   Current Functional Level Independent in ADLs/IADLs   Can patient return to prior living arrangement Yes   Ability to make needs known: Good   Family able to assist with home care needs: Yes   Would you like for me to discuss the discharge plan with any other family members/significant others, and if so, who? No   Social/Functional History   Lives With Spouse; Son   Type of [address] [place] [address] Help From Family   ADL Assistance Independent   Ambulation Assistance Independent   Active  Yes   Mode of Transportation Car   Occupation Retired   Discharge Planning   Living Arrangements Spouse/Significant Other   Current Services Prior To Admission None   Potential Assistance Needed N/A   DME Ordered? No   Potential Assistance Purchasing Medications No   Type of Home Care Services Nursing Services   Patient expects to be discharged to: [address] Discharge   Transition of Care Consult (CM Consult) Home Health;Discharge Planning   Internal [facility] Provided?  No   Mode of Transport at Discharge Other (see comment)  (family)   Confirm Follow Up Transport Family   Condition of Participation: Discharge Planning   The Plan for Transition of Care is related to the following treatment goals: Pt will need home health care for recovery to return to his functional baseline. The Patient and/or Patient Representative was provided with a Choice of Provider? Patient;Patient Representative   Name of the Patient Representative who was provided with the Choice of Provider and agrees with the Discharge Plan?  son   The Patient and/Or Patient Representative agree with the Discharge Plan? Yes   Freedom of Choice list was provided with basic dialogue that supports the patient's individualized plan of care/goals, treatment preferences, and shares the quality data associated with the providers?   Yes

## 2023-01-02 NOTE — PROGRESS NOTES
[facility]        2023         Post Op day: 3 Days Post-Op Procedure(s) (LRB):  Left HAND irrigation and debridement (Left)      Admit Date: 2022  Admit Diagnosis: Cat bite of hand, left, initial encounter [S61.452A, W55.01XA]  Abscess of hand including fingers, left [L02.512]  Antibiotics received only during hospital stay [Z78.9]  Abscess, hand [L02.519]       Principle Problem: Antibiotics received only during hospital stay. Subjective: Doing well, No complaints, No SOB, No Chest Pain, No Nausea or Vomiting. No pain extending up wrist or forearm. Objective:   Vital Signs are Stable, No Acute Distress, Alert,  Dressing is Dry,  Neurovascular exam is normal.    Dressing Taken down at bedside, healing well, xeroform and nonstick pad replaced. Nurse rewrapped. Mild-moderate swelling, no erythema streaking, appears apropriate brusing and swelling. Fingers with good perfusion. Assessment / Plan :  Patient Active Problem List   Diagnosis    Encounter for long-term (current) use of medications    Obesity    Arthritis    Ischemic cardiomyopathy    Allergic rhinitis    Chronic bilateral low back pain without sciatica    Prediabetes    Mediastinal adenopathy    Dyslipidemia    Left foot drop    Coronary artery disease involving native coronary artery of native heart    Hypertension    Macular degeneration    Cat bite of hand, left, initial encounter    Abscess of hand including fingers, left    Antibiotics received only during hospital stay    Abscess, hand    Patient Vitals for the past 8 hrs:   BP Temp Temp src Pulse Resp SpO2   23 0737 137/72 97.3 °F (36.3 °C) Oral 73 20 97 %   23 0516 131/69 98.6 °F (37 °C) Oral 73 18 92 %   23 0147 119/62 98.6 °F (37 °C) Oral 67 18 91 %      Temp (24hrs), Av.9 °F (37.2 °C), Min:97.3 °F (36.3 °C), Max:101.5 °F (38.6 °C)    Body mass index is 29.84 kg/m².     Lab Results   Component Value Date/Time    HGB 10.0 12/31/2022 05:10 AM          S/P Procedure(s) (LRB):  Left HAND irrigation and debridement (Left): Cat bite    Antibiotics per ID: awaiting cultures and final treatment plan per ID--ok to discharge today if this is setup. Cultures from 12/30 still pending. Continue PT  Fall Precautions  DC disp: home w/ [facility] tomorrow if antibiotic plan finalized,  to handle dressing changes. Avoid pressing motion with operative hand.    F/U: 2 weeks postop for wound check and suture removal with Dr. Cameron Found By: YONNY Umana  1/2/2023,  8:50 AM

## 2023-01-03 ENCOUNTER — HOME CARE VISIT (OUTPATIENT)
Dept: SCHEDULING | Facility: HOME HEALTH | Age: 88
End: 2023-01-03

## 2023-01-03 ENCOUNTER — TELEPHONE (OUTPATIENT)
Dept: ORTHOPEDIC SURGERY | Age: 88
End: 2023-01-03

## 2023-01-03 DIAGNOSIS — S61.452A CAT BITE OF LEFT HAND, INITIAL ENCOUNTER: Primary | ICD-10-CM
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
## 2023-01-03 DIAGNOSIS — L02.512 ABSCESS OF LEFT HAND: ICD-10-CM

## 2023-01-03 PROCEDURE — 0221000100 HH NO PAY CLAIM PROCEDURE

## 2023-01-03 PROCEDURE — G0299 HHS/HOSPICE OF RN EA 15 MIN: HCPCS

## 2023-01-03 RX ORDER — OXYCODONE HYDROCHLORIDE 5 MG/1
5 TABLET ORAL EVERY 4 HOURS PRN
Qty: 18 TABLET | Refills: 0 | Status: SHIPPED | OUTPATIENT
Start: 2023-01-03 | End: 2023-01-06

## 2023-01-03 NOTE — TELEPHONE ENCOUNTER
His wife got his prescriptions filled and she went into [employer] to  something and laid the bag down and when she went back to pick it up it was gone. She came home and then got a call that the bag had been turned back in but the oxycodone was gone. [employer] at [phone] said you can call to confirm that this happened and that the patient did not receive his oxycodone.  The number is [phone]

## 2023-01-03 NOTE — TELEPHONE ENCOUNTER
Per Dr. Myra Syed, I called and spoke with Annika Kohler. Dr. Myra Syed will send them a new script. Annika Kohler voiced understanding.

## 2023-01-03 NOTE — DISCHARGE SUMMARY
[facility]   Discharge Summary         Patient ID:  Aubrey Sahni  [id number]  82 y.o.  1935    Admit date: 12/30/2022  Discharge date and time: 1/2/2023   Admitting Physician: Mala Padilla MD  Surgeon: Jose Correia Course    Admission Diagnoses: Pre op diagnosis: Cat bite of hand, left, initial encounter [S61.452A, W55.01XA]  Abscess of hand including fingers, left [L02.512]  Prior to surgery the patient was seen for consultation in the office or hospital and a complete history and physical was taken as it pertained to their condition. Discharge Diagnoses: Antibiotics received only during hospital stay. Chronic and Acute medical problems addressed during this hospital stay by consulting physicians include: They underwent Procedure(s) (LRB):  Left HAND irrigation and debridement (Left) for this. Principle Problem: Antibiotics received only during hospital stay. Other Chronic and Acute Medical Issues: managed by the hospitalist during admission included: Principal Problem:    Antibiotics received only during hospital stay  Active Problems:    Abscess, hand    Cat bite of hand, left, sequela    Cat bite of hand, left, initial encounter    Abscess of hand including fingers, left  Resolved Problems:    * No resolved hospital problems. *                               Perioperative Antibiotics:  Prior to surgery Ancef 1 to 2 mg was given depending on patient's weight and allergies. If the patient was allergic to Ancef or MRSA positive  the patient was given Vancomycin and Cleocin        Hospital Medications:   No current facility-administered medications for this encounter. Current Outpatient Medications   Medication Sig    oxyCODONE (ROXICODONE) 5 MG immediate release tablet Take 1 tablet by mouth every 4 hours as needed for Pain for up to 3 days.  Max Daily Amount: 30 mg    amoxicillin-clavulanate (AUGMENTIN) 875-125 MG per tablet Take 1 tablet by mouth 2 times daily for 14 days    Multiple Vitamins-Minerals (EYE VITAMINS PO) Take by mouth Destiny brand    oxyCODONE (ROXICODONE) 5 MG immediate release tablet Take 1 tablet by mouth every 4 hours as needed for Pain for up to 3 days. Intended supply: 7 days. Take lowest dose possible to manage pain Max Daily Amount: 30 mg    Multiple Vitamin (MULTIVITAMIN ADULT PO) Take 1 tablet by mouth daily    fluticasone (FLONASE) 50 MCG/ACT nasal spray ADMINISTER 2 SPRAYS EACH NOSTRIL DAILY    aspirin 81 MG EC tablet Take by mouth daily    carvedilol (COREG) 6.25 MG tablet TAKE ONE TABLET BY MOUTH TWICE A DAY WITH MEALS    vitamin D (CHOLECALCIFEROL) 25 MCG (1000 UT) TABS tablet Take 1,000 Units by mouth    fexofenadine (ALLEGRA) 180 MG tablet Take 180 mg by mouth daily    latanoprost (XALATAN) 0.005 % ophthalmic solution Place 1 drop into both eyes nightly    lisinopril-hydroCHLOROthiazide (PRINZIDE;ZESTORETIC) 20-25 MG per tablet Take 1 tablet by mouth daily    nitroGLYCERIN (NITROSTAT) 0.4 MG SL tablet Place 0.4 mg under the tongue every 5 minutes as needed    rosuvastatin (CRESTOR) 40 MG tablet Take 40 mg by mouth every evening         Additional DVT Prophylaxis:  MUNIRA Hose, SCDs     Postoperative Blood Report: If the patient received blood products during their admission they are listed below:     No components found for: PCTEXX  No results found for: PCTABR  Lab Results   Component Value Date/Time    ABORH A POSITIVE 01/06/2016 08:50 AM     No components found for: PCTUN  No components found for: PCTCT  No components found for: PCTUDIV  No components found for: PCTXM    Post Op complications: none       Physical Therapy: PT was started on the day of surgery and progressed. PT/OT:                             Disposition: Good    Upon Discharge: The wound appears to be healing. All drains removed. No ascending redness. Pt Discharged to: [address] [facility].     Discharge Medications:      Medication List        START taking these medications      oxyCODONE 5 MG immediate release tablet  Commonly known as: ROXICODONE  Take 1 tablet by mouth every 4 hours as needed for Pain for up to 3 days. Max Daily Amount: 30 mg            CHANGE how you take these medications      amoxicillin-clavulanate 875-125 MG per tablet  Commonly known as: AUGMENTIN  Take 1 tablet by mouth 2 times daily for 14 days  What changed: Another medication with the same name was removed. Continue taking this medication, and follow the directions you see here.             CONTINUE taking these medications      aspirin 81 MG EC tablet     carvedilol 6.25 MG tablet  Commonly known as: COREG     EYE VITAMINS PO     fexofenadine 180 MG tablet  Commonly known as: ALLEGRA     fluticasone 50 MCG/ACT nasal spray  Commonly known as: FLONASE  ADMINISTER 2 SPRAYS EACH NOSTRIL DAILY     latanoprost 0.005 % ophthalmic solution  Commonly known as: XALATAN     lisinopril-hydroCHLOROthiazide 20-25 MG per tablet  Commonly known as: PRINZIDE;ZESTORETIC     MULTIVITAMIN ADULT PO     nitroGLYCERIN 0.4 MG SL tablet  Commonly known as: NITROSTAT     rosuvastatin 40 MG tablet  Commonly known as: CRESTOR     vitamin D 25 MCG (1000 UT) Tabs tablet  Commonly known as: CHOLECALCIFEROL            STOP taking these medications      acetaminophen 500 MG tablet  Commonly known as: TYLENOL     HYDROcodone-acetaminophen 5-325 MG per tablet  Commonly known as: Norco               Where to Get Your Medications        These medications were sent to [address]      Phone: [phone]   amoxicillin-clavulanate 875-125 MG per tablet  oxyCODONE 5 MG immediate release tablet           Discharge instructions:  - Refer to the Medication Reconciliation sheet for all discharge medications   -Rx pain medication given   -Rx for augmentin given  -Resume pre hospital diet              -Ambulate with walker and fall precautions   -Follow up in office as scheduled       Signed:  YONNY Georges, PA  1/3/2023  10:56 AM

## 2023-01-04 LAB
BACTERIA SPEC CULT: NORMAL
SERVICE CMNT-IMP: NORMAL

## 2023-01-05 ENCOUNTER — HOME CARE VISIT (OUTPATIENT)
Dept: SCHEDULING | Facility: HOME HEALTH | Age: 88
End: 2023-01-05

## 2023-01-05 VITALS
HEART RATE: 76 BPM | RESPIRATION RATE: 15 BRPM | OXYGEN SATURATION: 96 % | TEMPERATURE: 98.5 F | DIASTOLIC BLOOD PRESSURE: 70 MMHG | SYSTOLIC BLOOD PRESSURE: 132 MMHG

## 2023-01-05 PROCEDURE — G0151 HHCP-SERV OF PT,EA 15 MIN: HCPCS

## 2023-01-05 ASSESSMENT — ENCOUNTER SYMPTOMS: STOOL DESCRIPTION: SOFT FORMED

## 2023-01-06 ENCOUNTER — HOME CARE VISIT (OUTPATIENT)
Dept: SCHEDULING | Facility: HOME HEALTH | Age: 88
End: 2023-01-06

## 2023-01-06 ENCOUNTER — TELEPHONE (OUTPATIENT)
Dept: ORTHOPEDIC SURGERY | Age: 88
End: 2023-01-06

## 2023-01-06 VITALS
RESPIRATION RATE: 15 BRPM | SYSTOLIC BLOOD PRESSURE: 118 MMHG | TEMPERATURE: 97.9 F | OXYGEN SATURATION: 96 % | DIASTOLIC BLOOD PRESSURE: 76 MMHG
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
## 2023-01-06 DIAGNOSIS — L02.512 ABSCESS OF LEFT HAND: ICD-10-CM

## 2023-01-06 DIAGNOSIS — S61.452A CAT BITE OF LEFT HAND, INITIAL ENCOUNTER: Primary | ICD-10-CM

## 2023-01-06 PROCEDURE — G0299 HHS/HOSPICE OF RN EA 15 MIN: HCPCS

## 2023-01-06 RX ORDER — AMOXICILLIN AND CLAVULANATE POTASSIUM 875; 125 MG/1; MG/1
1 TABLET, FILM COATED ORAL 2 TIMES DAILY
Qty: 46 TABLET | Refills: 0 | Status: SHIPPED | OUTPATIENT
Start: 2023-01-06 | End: 2023-01-29

## 2023-01-06 ASSESSMENT — ENCOUNTER SYMPTOMS: STOOL DESCRIPTION: SOFT FORMED

## 2023-01-06 NOTE — TELEPHONE ENCOUNTER
Requested Prescriptions     Pending Prescriptions Disp Refills    amoxicillin-clavulanate (AUGMENTIN) 875-125 MG per tablet 46 tablet 0     Sig: Take 1 tablet by mouth 2 times daily for 23 days

## 2023-01-06 NOTE — TELEPHONE ENCOUNTER
He is requesting a refill on amoxicillin to [facility] on [address].

## 2023-01-06 NOTE — TELEPHONE ENCOUNTER
Per Dr. Myra Syed, ID wanted him to take it for 4 weeks. Rx sent to Dr. Myra Syed to sign and send to pharmacy. Patient notified, expressed understanding.

## 2023-01-08 VITALS
OXYGEN SATURATION: 98 % | SYSTOLIC BLOOD PRESSURE: 122 MMHG | RESPIRATION RATE: 17 BRPM | HEART RATE: 77 BPM | TEMPERATURE: 98 F | DIASTOLIC BLOOD PRESSURE: 74 MMHG

## 2023-01-09 ENCOUNTER — HOME CARE VISIT (OUTPATIENT)
Dept: SCHEDULING | Facility: HOME HEALTH | Age: 88
End: 2023-01-09

## 2023-01-09 VITALS
HEART RATE: 76 BPM | SYSTOLIC BLOOD PRESSURE: 124 MMHG | DIASTOLIC BLOOD PRESSURE: 72 MMHG | TEMPERATURE: 97.9 F | RESPIRATION RATE: 16 BRPM

## 2023-01-09 PROCEDURE — G0299 HHS/HOSPICE OF RN EA 15 MIN: HCPCS

## 2023-01-09 ASSESSMENT — ENCOUNTER SYMPTOMS
STOOL DESCRIPTION: SOFT FORMED
PAIN LOCATION - PAIN QUALITY: SHARP

## 2023-01-11 ENCOUNTER — HOME CARE VISIT (OUTPATIENT)
Dept: SCHEDULING | Facility: HOME HEALTH | Age: 88
End: 2023-01-11

## 2023-01-11 VITALS
RESPIRATION RATE: 16 BRPM | DIASTOLIC BLOOD PRESSURE: 64 MMHG | SYSTOLIC BLOOD PRESSURE: 128 MMHG | OXYGEN SATURATION: 98 % | TEMPERATURE: 98.4 F | HEART RATE: 68 BPM

## 2023-01-11 VITALS
HEART RATE: 77 BPM | DIASTOLIC BLOOD PRESSURE: 78 MMHG | TEMPERATURE: 98 F | RESPIRATION RATE: 18 BRPM | SYSTOLIC BLOOD PRESSURE: 124 MMHG | OXYGEN SATURATION: 98 %

## 2023-01-11 PROCEDURE — G0151 HHCP-SERV OF PT,EA 15 MIN: HCPCS

## 2023-01-11 PROCEDURE — G0299 HHS/HOSPICE OF RN EA 15 MIN: HCPCS

## 2023-01-11 ASSESSMENT — ENCOUNTER SYMPTOMS
STOOL DESCRIPTION: SOFT FORMED
PAIN LOCATION - PAIN QUALITY: SHARP
PAIN LOCATION - PAIN QUALITY: SHARP

## 2023-01-12 ENCOUNTER — OFFICE VISIT (OUTPATIENT)
Dept: ORTHOPEDIC SURGERY | Age: 88
End: 2023-01-12

## 2023-01-12 DIAGNOSIS — S61.452A CAT BITE OF LEFT HAND, INITIAL ENCOUNTER: Primary | ICD-10-CM

## 2023-01-12 DIAGNOSIS — L02.512 ABSCESS OF LEFT HAND: ICD-10-CM

## 2023-01-12 DIAGNOSIS — M25.60 DECREASED RANGE OF MOTION: ICD-10-CM
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
## 2023-01-12 DIAGNOSIS — M25.442 EFFUSION OF LEFT HAND: ICD-10-CM

## 2023-01-12 DIAGNOSIS — Z78.9 DECREASED ACTIVITIES OF DAILY LIVING (ADL): ICD-10-CM

## 2023-01-12 NOTE — PROGRESS NOTES
JARAD VELASCO [facility]  [address] [address]  Dept: [phone]      Occupational Therapy Initial Assessment     Referring MD: Friend, Jaleesa FERNANDEZ MD    Diagnosis:     ICD-10-CM    1. Cat bite of left hand, initial encounter  S61.452A     W55.01XA       2. Decreased activities of daily living (ADL)  Z78.9       3. Decreased range of motion  M25.60       4. Effusion of left hand  M25.442            Surgery: Date 12/30/22  Left HAND irrigation and debridement     Therapy precautions: None    History of injury/onset : The patient is an 87 year old male s/p cat bite to left hand/thumb on 12/20/22. Subsequently developed infection and underwent left hand irrigation and debridement.  Patient admitted x 3 days for IV antibiotics. He has since been discharged and placed on oral antibiotics x 4 weeks.     Total Direct Treatment Time: 15 min                       Total In Office Time: 45 min    Preferred Name:  Luke    PERTINENT MEDICAL HISTORY     PMHX & Meds:   Past Medical History:   Diagnosis Date    Borderline diabetes 10/08/2015    diet controlled    CAD (coronary artery disease) 06/2018    NSTEMI, 2 stents    COVID-19 vaccine series completed 03/04/2021    moderna vaccine completed.  card scanned under media    Environmental allergies     H/O thromboembolism 4/15/2015    HCAP (healthcare-associated pneumonia) 6/13/2018    Hypertension     managed w/med    Ischemic cardiomyopathy 6/1/2018    1. LV gram:  EF 40-45% with apical hypokinesis.  2.  Echo (6/2/18):  EF 50-55%. No signnificant valve disease.     Macular degeneration     injections in eye, 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    left     S/P total knee arthroplasty 4/15/2015    Status post total left knee replacement 1/6/2016    Status post total right knee replacement 1/11/2016    Status post total right knee replacement 1/11/2016    Superficial incisional infection  of surgical site 08/09/2021    Thromboembolus ([address] 8/2002    LLE; due to fall   ,   Past Surgical History:   Procedure Laterality Date    CATARACT REMOVAL Right     w/lens implant    COLONOSCOPY      HAND SURGERY Left 12/30/2022    Left HAND irrigation and debridement performed by Triston Noel MD at [place] [address]  06/14/2021    left L3-4 left L4-5 Dr. Sapna Chaidez stenosis    [address]  2018    heart cath 2 stents    TOTAL KNEE ARTHROPLASTY Right 2016    TOTAL KNEE ARTHROPLASTY Left 4/2015      Medications. : Reviewed in chart  Allergies: Allergies   Allergen Reactions    Sulfamethoxazole-Trimethoprim Rash    Meloxicam Itching        SUBJECTIVE     Current Symptoms/Chief complaints:   Chief Complaint   Patient presents with    Hand Injury           Chief complaint/history of injury:   Date symptoms began: 12/20/22  Lm Rivera of condition:Recent onset (initial onset within last 3 months)  Primary cause of current episode: Traumatic  How did symptoms start: s/p cat bite  Describe current symptoms: pain and edema left thumb/hand    Received previous outpatient therapy? No      Pain Assessment:  Pain location: left thumb  Average Pain/symptom intensity (0-10 scale)  Last 24 hours: 1/10  Last week (1-7 days): 5/10  How often do you feel symptoms? Frequently (51-75%)  Description: throbbing  Aggravating factors:  hand ROM  Alleviating factors: rest    Social/Functional Hx:  Pt lives lives with their spouse   Current DME: none  Work Status: Retired   Sleep: moderately disturbed  PLOF & Social Hx/Interests:  Independent and active without physical limitations and participated in ADL's and household tasks  Current level of function: modified independent with ADL's and household activities    Neuro screen: Pt denies c/o    Patient Stated Goals: improve hand function    OBJECTIVE     Functional Outcome Measures: Quick Dash  25 score=   31.8 % functional deficit  Hand/Side Dominance: right handed  Observation/Posture:  normal posture  Palpation: n/a  Swelling/Edema: moderate left thumb; formal measurements will be taken next session  Skin Integrity:  light dressing left htumb      A/PROM MEASUREMENTS    Screenings: Shoulder screened- WNL  Elbow screened- WNL      Thumb A/PROM: LEFT  EVAL      MP ext/flex 10/15°       IP ext/flex 0/20°       Radial add/abduction Full/20°       Palmar abduction 20°       Opposition (Diego Pap): Side of IF             WRIST  AROM:        Wrist ext/flex 60/55        Edema (cm) RIGHT   IE LEFT  IE                                      Strength/MMT (0-5 Scale) : Not tested secondary to precautions      Special Tests:  None performed   Evaluation Modifications/Assistance required : Verbal cues and Physical cues  Degree of assistance provided: minimal     Treatment:  Initial Evaluation: Low Complexity (38431)      Therapeutic exercise (23341) x 15 min:  Home Exercise Program development and Education: see below. Patient I with program following instruction and performance  Use of heat and ice as needed for pain and inflammation reduction. Precautions reviewed. OT POC and rationale, education for surgical precautions and pain management, edema management      CLINICAL DECISION MAKING/ASSESSMENT     Performance Deficits  Physical: Mobility and Strength  Cognitive: No deficiencies noted  Psychosocial: No deficiencies noted  Rehab potential: good    The patient presents today s/p cat bit to left hand resulting in infection. He underwent irrigation and debridement on 12/30/22. The patient presents with significant decreased thumb ROM due to edema and joint stiffness and likely tendon adhesions. Based on these subjective and objective findings, the patient is perceived as currently having a primary functional deficit of  31.8% dysfunction.      After a brief chart/PMH and OT profile review, evaluation requiring minimal  assistance/modifications and simple patient and data analysis, I have determined the patient exhibits several (1-3) performance deficits. Comorbidities do not affect the patient's occupational performance. The following performance deficits (including but not limited to physical, cognitive and/or psychosocial components) result in activity limitations and participation restrictions: Mobility and Strength    The patient would benefit from skilled occupational therapy services to address the deficits noted above for return to prior level of function. PLAN OF CARE     Effective Dates: 1/12/2023 TO 4/12/2023 (90 days). Frequency/Duration: 2x/week for 90 Day(s)  Interventions may include but are not limited to: (70165) Therapeutic exercise to develop strength and endurance, range of motion, and flexibility, (42127) Manual Therapy:   Consisting of but not limited to hands on treatment by therapist for connective tissue massage, pain management, edema management, joint mobilization and manipulation, manual traction, passive range of motion, soft tissue and neural system mobilization/manipulation and therapeutic massage., Home exercise program (HEP) development, (31505) Kineseotaping for Neuromuscular Re-education : Muscle inhibition or facilitation, space correction, to improve proprioception,; for endurance or correction of postural stabilizers; addressing trophic changes of complex regional pain syndrome, (92204) Kineseotaping for Manual Therapy Techniques for soft tissue mobilization, facilitation of muscles, pain management, lymphatic management, swelling management, scar mobilization, myofascial correction, mechanical joint correction or support, and (50478) Kineseotaping for Therapeutic Procedure/Exercise  for strengthening or lengthening a muscle.  Used in conjunction with retraining posture, endurance and flexibility    GOALS     Short term goals: 2/9/2023  (4 weeks)   Patient will be I with HEP  Increase AROM of affected thumb MP flexion to at least 30 ° to improve ability to grasp. Increase PROM of affected thumb MP extension to full to improve ability to open hand to obtain objects. Increase AROM of affected thumb IP flexion to at least 45 ° to improve ability to grasp. Increase PROM of affected thumb IP extension to full ° to improve ability to open hand for ADL's. Increase AROM of affected thumb abduction to at least 45 ° to improve ability to open hand for grasp and release. Increase active thumb opposition to RF to improve ability to bring thumb around glass/bottle in order to hold it. Decrease edema in affected thumb to minimal to improve motion. Improve Quick DASH functional assessment score from less than 30% deficit. Long term goals: 4/6/2023  (12 weeks)   Pt will be able to use the affected UE for all ADL's without difficulty. Pt will have adequate strength to be able to hold and open containers without difficulty. Pt will have adequate pinch strength for activities like turning a key and open bottles. Pt will be able to make a full composite thumb flexion with the involved hand to enable to grasp and hold objects. Pt will have full thumb abduction of affected side to be able to open hand to acquire items for ADL's. Pt will have adequate opposition in involved thumb for writing and holding items in hand. Minimal edema in involved thumb. Improve Quick DASH functional assessment score from less than 20% deficit. [employer]   Access Code: [email or web address]  URL: [email or web address]  Date: 01/12/2023  Prepared by: Bethany Walker    Exercises  Wrist Extension AROM - 5 x daily - 7 x weekly - 2 sets - 10 reps  Wrist AROM Radial Ulnar Deviation - 5 x daily - 7 x weekly - 2 sets - 10 reps  Seated Finger Composite Flexion Extension - 5 x daily - 7 x weekly - 2 sets - 10 reps  Thumb AROM Opposition To All Fingers - 5 x daily - 7 x weekly - 2 sets - 10 reps  Thumb AROM Palmar Abduction to Radial Abduction - 5 x daily - 7 x weekly - 2 sets - 10 reps  Seated Composite Thumb Flexion AROM - 5 x daily - 7 x weekly - 2 sets - 10 reps  Seated Thumb IP Flexion AROM - 5 x daily - 7 x weekly - 2 sets - 10 reps    OT Protocols

## 2023-01-12 NOTE — PROGRESS NOTES
Orthopaedic Hand Surgery Note    Name: Naa Hernández  YOB: 1935  Gender: male  MRN: [id number]    Post Operative Visit: Left HAND irrigation and debridement - Left    HPI: Patient is status post Left HAND irrigation and debridement - Left on 12/30/2022. Patient reports well controlled pain. Physical Examination:  Surgical incision is clean, dry and intact. Sutures are in place. There is no fluctuance or expressible drainage. . Sensation is intact in all fingers. Motor exam reveals no deficits. .    Imaging:     none    Assessment:   1. Cat bite of left hand, initial encounter         Status post Left HAND irrigation and debridement - Left on 12/30/2022    Plan:  We discussed the post operative course and progression. Sutures removed and dry dressing applied today. Continue with soap and water washes and dry dressing changes daily. He has an appointment to begin hand therapy today. We will continue his antibiotics for total of 4 weeks postoperatively as recommended by infectious disease.   I will see him back in 4 weeks        Hector Luna MD  01/12/23  1:16 PM

## 2023-01-13 ENCOUNTER — HOME CARE VISIT (OUTPATIENT)
Dept: SCHEDULING | Facility: HOME HEALTH | Age: 88
End: 2023-01-13

## 2023-01-13 VITALS
RESPIRATION RATE: 15 BRPM | TEMPERATURE: 98.4 F | SYSTOLIC BLOOD PRESSURE: 118 MMHG | HEART RATE: 76 BPM | DIASTOLIC BLOOD PRESSURE: 74 MMHG | OXYGEN SATURATION: 96 %

## 2023-01-13 PROCEDURE — G0299 HHS/HOSPICE OF RN EA 15 MIN: HCPCS

## 2023-01-13 ASSESSMENT — ENCOUNTER SYMPTOMS
PAIN LOCATION - PAIN QUALITY: SHARP
STOOL DESCRIPTION: SOFT FORMED

## 2023-01-16 ENCOUNTER — HOME CARE VISIT (OUTPATIENT)
Dept: HOME HEALTH SERVICES | Facility: HOME HEALTH | Age: 88
End: 2023-01-16
Payer: MEDICARE

## 2023-01-16 ENCOUNTER — HOME CARE VISIT (OUTPATIENT)
Dept: SCHEDULING | Facility: HOME HEALTH | Age: 88
End: 2023-01-16
Payer: MEDICARE

## 2023-01-16 VITALS
SYSTOLIC BLOOD PRESSURE: 128 MMHG | HEART RATE: 68 BPM | TEMPERATURE: 97.7 F | OXYGEN SATURATION: 95 % | DIASTOLIC BLOOD PRESSURE: 74 MMHG | RESPIRATION RATE: 16 BRPM

## 2023-01-16 PROCEDURE — G0299 HHS/HOSPICE OF RN EA 15 MIN: HCPCS

## 2023-01-16 ASSESSMENT — ENCOUNTER SYMPTOMS
STOOL DESCRIPTION: SOFT FORMED
PAIN LOCATION - PAIN QUALITY: ACHE

## 2023-01-18 ENCOUNTER — HOME CARE VISIT (OUTPATIENT)
Dept: SCHEDULING | Facility: HOME HEALTH | Age: 88
End: 2023-01-18
Payer: MEDICARE

## 2023-01-18 PROCEDURE — G0299 HHS/HOSPICE OF RN EA 15 MIN: HCPCS

## 2023-01-18 PROCEDURE — G0152 HHCP-SERV OF OT,EA 15 MIN: HCPCS

## 2023-01-19 VITALS
DIASTOLIC BLOOD PRESSURE: 62 MMHG | TEMPERATURE: 98.4 F | HEART RATE: 64 BPM | RESPIRATION RATE: 15 BRPM | SYSTOLIC BLOOD PRESSURE: 112 MMHG | OXYGEN SATURATION: 98 %

## 2023-01-19 VITALS
DIASTOLIC BLOOD PRESSURE: 62 MMHG | OXYGEN SATURATION: 97 % | RESPIRATION RATE: 16 BRPM | TEMPERATURE: 97.4 F | SYSTOLIC BLOOD PRESSURE: 136 MMHG | HEART RATE: 64 BPM

## 2023-01-19 ASSESSMENT — ENCOUNTER SYMPTOMS: PAIN LOCATION - PAIN QUALITY: SORE/ACHE

## 2023-01-20 ENCOUNTER — HOME CARE VISIT (OUTPATIENT)
Dept: SCHEDULING | Facility: HOME HEALTH | Age: 88
End: 2023-01-20
Payer: MEDICARE

## 2023-01-20 PROCEDURE — G0299 HHS/HOSPICE OF RN EA 15 MIN: HCPCS

## 2023-01-22 VITALS
OXYGEN SATURATION: 97 % | TEMPERATURE: 97.2 F | SYSTOLIC BLOOD PRESSURE: 114 MMHG | DIASTOLIC BLOOD PRESSURE: 69 MMHG | RESPIRATION RATE: 15 BRPM | HEART RATE: 70 BPM

## 2023-01-22 ASSESSMENT — ENCOUNTER SYMPTOMS
DYSPNEA ACTIVITY LEVEL: AT REST
STOOL DESCRIPTION: SOFT FORMED
PAIN LOCATION - PAIN QUALITY: ACHE
PAIN LOCATION - PAIN QUALITY: SHARP
STOOL DESCRIPTION: SOFT FORMED

## 2023-01-24 ENCOUNTER — HOME CARE VISIT (OUTPATIENT)
Dept: SCHEDULING | Facility: HOME HEALTH | Age: 88
End: 2023-01-24
Payer: MEDICARE

## 2023-01-24 VITALS
OXYGEN SATURATION: 97 % | HEART RATE: 84 BPM | SYSTOLIC BLOOD PRESSURE: 118 MMHG | TEMPERATURE: 97.2 F | RESPIRATION RATE: 15 BRPM | DIASTOLIC BLOOD PRESSURE: 62 MMHG

## 2023-01-24 PROCEDURE — G0299 HHS/HOSPICE OF RN EA 15 MIN: HCPCS

## 2023-01-24 ASSESSMENT — ENCOUNTER SYMPTOMS
STOOL DESCRIPTION: SOFT FORMED
PAIN LOCATION - PAIN QUALITY: SHARP
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
## 2023-01-26 DIAGNOSIS — S61.452A CAT BITE OF LEFT HAND, INITIAL ENCOUNTER: ICD-10-CM

## 2023-01-26 DIAGNOSIS — L02.512 ABSCESS OF LEFT HAND: ICD-10-CM

## 2023-01-26 RX ORDER — AMOXICILLIN AND CLAVULANATE POTASSIUM 875; 125 MG/1; MG/1
TABLET, FILM COATED ORAL
Qty: 46 TABLET | Refills: 0 | OUTPATIENT
Start: 2023-01-26

## 2023-01-27 ENCOUNTER — HOME CARE VISIT (OUTPATIENT)
Dept: SCHEDULING | Facility: HOME HEALTH | Age: 88
End: 2023-01-27
Payer: MEDICARE

## 2023-01-27 VITALS
HEART RATE: 78 BPM | OXYGEN SATURATION: 97 % | RESPIRATION RATE: 15 BRPM | TEMPERATURE: 98.2 F | DIASTOLIC BLOOD PRESSURE: 78 MMHG | SYSTOLIC BLOOD PRESSURE: 126 MMHG

## 2023-01-27 DIAGNOSIS — L02.512 ABSCESS OF LEFT HAND: ICD-10-CM

## 2023-01-27 DIAGNOSIS — S61.452A CAT BITE OF LEFT HAND, INITIAL ENCOUNTER: ICD-10-CM
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
## 2023-01-27 PROCEDURE — G0299 HHS/HOSPICE OF RN EA 15 MIN: HCPCS

## 2023-01-27 RX ORDER — AMOXICILLIN AND CLAVULANATE POTASSIUM 875; 125 MG/1; MG/1
TABLET, FILM COATED ORAL
Qty: 46 TABLET | Refills: 0 | OUTPATIENT
Start: 2023-01-27

## 2023-01-27 ASSESSMENT — ENCOUNTER SYMPTOMS: STOOL DESCRIPTION: SOFT FORMED
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
## 2023-01-29 DIAGNOSIS — S61.452A CAT BITE OF LEFT HAND, INITIAL ENCOUNTER: ICD-10-CM

## 2023-01-29 DIAGNOSIS — L02.512 ABSCESS OF LEFT HAND: ICD-10-CM

## 2023-01-30 RX ORDER — LISINOPRIL AND HYDROCHLOROTHIAZIDE 25; 20 MG/1; MG/1
TABLET ORAL
Qty: 90 TABLET | Refills: 3 | Status: SHIPPED | OUTPATIENT
Start: 2023-01-30

## 2023-01-30 RX ORDER — ROSUVASTATIN CALCIUM 40 MG/1
TABLET, COATED ORAL
Qty: 90 TABLET | Refills: 3 | Status: SHIPPED | OUTPATIENT
Start: 2023-01-30

## 2023-01-30 RX ORDER — CARVEDILOL 6.25 MG/1
TABLET ORAL
Qty: 180 TABLET | Refills: 3 | Status: SHIPPED | OUTPATIENT
Start: 2023-01-30

## 2023-01-30 NOTE — TELEPHONE ENCOUNTER
Prescriptions sent to pharmacy[email or web address]  Requested Prescriptions     Signed Prescriptions Disp Refills    carvedilol (COREG) 6.25 MG tablet 180 tablet 3     Sig: TAKE ONE TABLET BY MOUTH TWICE A DAY WITH MEALS     Authorizing Provider: Mildred Maldonado User: Alia Rodriguez    lisinopril-hydroCHLOROthiazide (PRINZIDE;ZESTORETIC) 20-25 MG per tablet 90 tablet 3     Sig: TAKE ONE TABLET BY MOUTH EVERY DAY     Authorizing Provider: Mildred Maldonado User: Alia Rodriguez    rosuvastatin (CRESTOR) 40 MG tablet 90 tablet 3     Sig: TAKE ONE TABLET BY MOUTH NIGHTLY     Authorizing Provider: Mildred Maldonado User: Alia Rodriguez

## 2023-01-31 ENCOUNTER — TELEPHONE (OUTPATIENT)
Dept: CARDIOLOGY CLINIC | Age: 88
End: 2023-01-31

## 2023-01-31 ENCOUNTER — HOME CARE VISIT (OUTPATIENT)
Dept: SCHEDULING | Facility: HOME HEALTH | Age: 88
End: 2023-01-31
Payer: MEDICARE

## 2023-01-31 PROCEDURE — G0299 HHS/HOSPICE OF RN EA 15 MIN: HCPCS

## 2023-01-31 RX ORDER — AMOXICILLIN AND CLAVULANATE POTASSIUM 875; 125 MG/1; MG/1
TABLET, FILM COATED ORAL
Qty: 46 TABLET | Refills: 0 | OUTPATIENT
Start: 2023-01-31

## 2023-01-31 NOTE — TELEPHONE ENCOUNTER
MEDICATION REFILL REQUEST      Name of Medication:  Carvedilol  Dose:  25 mg  Frequency:  2 a day  Quantity:  ?  Days' supply:  ? Pharmacy Name/Location:  no pharmacy info please call pt    MEDICATION REFILL REQUEST      Name of Medication:  Rosuvastatin  Dose:  40 mg  Frequency:  1 a day  Quantity:  ?  Days' supply:  ? Pharmacy Name/Location:  ? MEDICATION REFILL REQUEST      Name of Medication:  Lisinopril-Hydrochlorothiazide  Dose:  20-25 mg  Frequency:  1 a day  Quantity:  ?  Day supply:? Pharmacy Name/Location:  ?

## 2023-01-31 NOTE — TELEPHONE ENCOUNTER
Left message on voice mail that prescriptions were sent into [facility] in [facility] yesterday call back if prescriptions need to be sent to another pharmacy//[facility]

## 2023-02-03 ENCOUNTER — HOME CARE VISIT (OUTPATIENT)
Dept: SCHEDULING | Facility: HOME HEALTH | Age: 88
End: 2023-02-03
Payer: MEDICARE

## 2023-02-03 VITALS
RESPIRATION RATE: 15 BRPM | TEMPERATURE: 98.1 F | DIASTOLIC BLOOD PRESSURE: 78 MMHG | DIASTOLIC BLOOD PRESSURE: 74 MMHG | OXYGEN SATURATION: 96 % | TEMPERATURE: 98.8 F | HEART RATE: 76 BPM | SYSTOLIC BLOOD PRESSURE: 126 MMHG | OXYGEN SATURATION: 96 % | HEART RATE: 76 BPM | SYSTOLIC BLOOD PRESSURE: 118 MMHG | RESPIRATION RATE: 16 BRPM

## 2023-02-03 PROCEDURE — G0299 HHS/HOSPICE OF RN EA 15 MIN: HCPCS

## 2023-02-03 ASSESSMENT — ENCOUNTER SYMPTOMS
STOOL DESCRIPTION: SOFT FORMED
STOOL DESCRIPTION: SOFT FORMED

## 2023-02-09 ENCOUNTER — OFFICE VISIT (OUTPATIENT)
Dept: ORTHOPEDIC SURGERY | Age: 88
End: 2023-02-09

## 2023-02-09 ENCOUNTER — HOME CARE VISIT (OUTPATIENT)
Dept: SCHEDULING | Facility: HOME HEALTH | Age: 88
End: 2023-02-09
Payer: MEDICARE
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
## 2023-02-09 DIAGNOSIS — S61.452D CAT BITE OF LEFT HAND, SUBSEQUENT ENCOUNTER: Primary | ICD-10-CM

## 2023-02-09 PROCEDURE — G0299 HHS/HOSPICE OF RN EA 15 MIN: HCPCS

## 2023-02-09 NOTE — PROGRESS NOTES
Orthopaedic Hand Surgery Note    Name: Zelalem Salazar  YOB: 1935  Gender: male  MRN: [id number]    Post Operative Visit: Left HAND irrigation and debridement - Left    HPI: Patient is status post Left HAND irrigation and debridement - Left on 12/30/2022. Patient reports well controlled pain. He was able to do yard work this weekend and uses hand just as well as his right    Physical Examination:  Surgical incisions are well healed. Sensation is intact in all fingers. Motor exam reveals no deficits. .    Imaging:     none    Assessment:   1. Cat bite of left hand, subsequent encounter         Status post Left HAND irrigation and debridement - Left on 12/30/2022    Plan:  We discussed the post operative course and progression. He can perform all activities as tolerated.  He will follow up as needed        Helio Aviles MD  02/09/23  1:41 PM

## 2023-02-10 VITALS
HEART RATE: 78 BPM | TEMPERATURE: 97.8 F | SYSTOLIC BLOOD PRESSURE: 126 MMHG | OXYGEN SATURATION: 96 % | DIASTOLIC BLOOD PRESSURE: 78 MMHG | RESPIRATION RATE: 15 BRPM

## 2023-02-16 ASSESSMENT — ENCOUNTER SYMPTOMS: STOOL DESCRIPTION: SOFT FORMED

## 2023-03-09 ENCOUNTER — OFFICE VISIT (OUTPATIENT)
Dept: INTERNAL MEDICINE CLINIC | Facility: CLINIC | Age: 88
End: 2023-03-09

## 2023-03-09 VITALS
RESPIRATION RATE: 16 BRPM | WEIGHT: 206 LBS | SYSTOLIC BLOOD PRESSURE: 130 MMHG | HEART RATE: 64 BPM | HEIGHT: 72 IN | TEMPERATURE: 97.4 F | BODY MASS INDEX: 27.9 KG/M2 | DIASTOLIC BLOOD PRESSURE: 76 MMHG

## 2023-03-09 DIAGNOSIS — R73.03 PREDIABETES: ICD-10-CM

## 2023-03-09 DIAGNOSIS — I25.5 ISCHEMIC CARDIOMYOPATHY: ICD-10-CM

## 2023-03-09 DIAGNOSIS — I10 PRIMARY HYPERTENSION: Primary | ICD-10-CM

## 2023-03-09 DIAGNOSIS — R53.82 CHRONIC FATIGUE, UNSPECIFIED: ICD-10-CM

## 2023-03-09 DIAGNOSIS — Z00.00 MEDICARE ANNUAL WELLNESS VISIT, SUBSEQUENT: ICD-10-CM

## 2023-03-09 DIAGNOSIS — I25.10 CORONARY ARTERY DISEASE INVOLVING NATIVE CORONARY ARTERY OF NATIVE HEART WITHOUT ANGINA PECTORIS: ICD-10-CM

## 2023-03-09 DIAGNOSIS — M54.32 SCIATICA OF LEFT SIDE: ICD-10-CM
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
## 2023-03-09 PROBLEM — E66.9 OBESITY: Status: RESOLVED | Noted: 2019-05-13 | Resolved: 2023-03-09

## 2023-03-09 PROBLEM — L02.519 ABSCESS, HAND: Status: RESOLVED | Noted: 2022-12-30 | Resolved: 2023-03-09
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
## 2023-03-09 PROBLEM — S61.452A CAT BITE OF HAND, LEFT, INITIAL ENCOUNTER: Status: RESOLVED | Noted: 2022-12-29 | Resolved: 2023-03-09

## 2023-03-09 PROBLEM — L02.512: Status: RESOLVED | Noted: 2022-12-29 | Resolved: 2023-03-09

## 2023-03-09 PROBLEM — S61.452S: Status: RESOLVED | Noted: 2023-01-02 | Resolved: 2023-03-09

## 2023-03-09 PROBLEM — Z78.9: Status: RESOLVED | Noted: 2022-12-30 | Resolved: 2023-03-09

## 2023-03-09 LAB
ALT SERPL-CCNC: 25 U/L (ref 12–65)
ANION GAP SERPL CALC-SCNC: 4 MMOL/L (ref 2–11)
BASOPHILS # BLD: 0 K/UL (ref 0–0.2)
BASOPHILS NFR BLD: 1 % (ref 0–2)
BUN SERPL-MCNC: 24 MG/DL (ref 8–23)
CALCIUM SERPL-MCNC: 9.5 MG/DL (ref 8.3–10.4)
CHLORIDE SERPL-SCNC: 104 MMOL/L (ref 101–110)
CHOLEST SERPL-MCNC: 100 MG/DL
CO2 SERPL-SCNC: 26 MMOL/L (ref 21–32)
CREAT SERPL-MCNC: 1.1 MG/DL (ref 0.8–1.5)
DIFFERENTIAL METHOD BLD: ABNORMAL
EOSINOPHIL # BLD: 0.2 K/UL (ref 0–0.8)
EOSINOPHIL NFR BLD: 4 % (ref 0.5–7.8)
ERYTHROCYTE [DISTWIDTH] IN BLOOD BY AUTOMATED COUNT: 13.7 % (ref 11.9–14.6)
EST. AVERAGE GLUCOSE BLD GHB EST-MCNC: 117 MG/DL
GLUCOSE SERPL-MCNC: 118 MG/DL (ref 65–100)
HBA1C MFR BLD: 5.7 % (ref 4.8–5.6)
HCT VFR BLD AUTO: 36.9 % (ref 41.1–50.3)
HDLC SERPL-MCNC: 34 MG/DL (ref 40–60)
HDLC SERPL: 2.9
HGB BLD-MCNC: 12.1 G/DL (ref 13.6–17.2)
IMM GRANULOCYTES # BLD AUTO: 0 K/UL (ref 0–0.5)
IMM GRANULOCYTES NFR BLD AUTO: 0 % (ref 0–5)
LDLC SERPL CALC-MCNC: 44.4 MG/DL
LYMPHOCYTES # BLD: 1.4 K/UL (ref 0.5–4.6)
LYMPHOCYTES NFR BLD: 28 % (ref 13–44)
MCH RBC QN AUTO: 29.9 PG (ref 26.1–32.9)
MCHC RBC AUTO-ENTMCNC: 32.8 G/DL (ref 31.4–35)
MCV RBC AUTO: 91.1 FL (ref 82–102)
MONOCYTES # BLD: 0.7 K/UL (ref 0.1–1.3)
MONOCYTES NFR BLD: 15 % (ref 4–12)
NEUTS SEG # BLD: 2.6 K/UL (ref 1.7–8.2)
NEUTS SEG NFR BLD: 52 % (ref 43–78)
NRBC # BLD: 0 K/UL (ref 0–0.2)
PLATELET # BLD AUTO: 216 K/UL (ref 150–450)
PMV BLD AUTO: 9.9 FL (ref 9.4–12.3)
POTASSIUM SERPL-SCNC: 4.1 MMOL/L (ref 3.5–5.1)
RBC # BLD AUTO: 4.05 M/UL (ref 4.23–5.6)
SODIUM SERPL-SCNC: 134 MMOL/L (ref 133–143)
TRIGL SERPL-MCNC: 108 MG/DL (ref 35–150)
VLDLC SERPL CALC-MCNC: 21.6 MG/DL (ref 6–23)
WBC # BLD AUTO: 4.9 K/UL (ref 4.3–11.1)

## 2023-03-09 SDOH — ECONOMIC STABILITY: FOOD INSECURITY: WITHIN THE PAST 12 MONTHS, YOU WORRIED THAT YOUR FOOD WOULD RUN OUT BEFORE YOU GOT MONEY TO BUY MORE.: NEVER TRUE

## 2023-03-09 SDOH — ECONOMIC STABILITY: HOUSING INSECURITY
IN THE LAST 12 MONTHS, WAS THERE A TIME WHEN YOU DID NOT HAVE A STEADY PLACE TO SLEEP OR SLEPT IN A SHELTER (INCLUDING NOW)?: NO

## 2023-03-09 SDOH — ECONOMIC STABILITY: FOOD INSECURITY: WITHIN THE PAST 12 MONTHS, THE FOOD YOU BOUGHT JUST DIDN'T LAST AND YOU DIDN'T HAVE MONEY TO GET MORE.: NEVER TRUE

## 2023-03-09 SDOH — ECONOMIC STABILITY: INCOME INSECURITY: HOW HARD IS IT FOR YOU TO PAY FOR THE VERY BASICS LIKE FOOD, HOUSING, MEDICAL CARE, AND HEATING?: NOT HARD AT ALL

## 2023-03-09 ASSESSMENT — PATIENT HEALTH QUESTIONNAIRE - PHQ9
SUM OF ALL RESPONSES TO PHQ9 QUESTIONS 1 & 2: 0
1. LITTLE INTEREST OR PLEASURE IN DOING THINGS: 0
SUM OF ALL RESPONSES TO PHQ QUESTIONS 1-9: 0
2. FEELING DOWN, DEPRESSED OR HOPELESS: 0

## 2023-03-09 ASSESSMENT — LIFESTYLE VARIABLES
HOW MANY STANDARD DRINKS CONTAINING ALCOHOL DO YOU HAVE ON A TYPICAL DAY: PATIENT DOES NOT DRINK
HOW OFTEN DO YOU HAVE A DRINK CONTAINING ALCOHOL: NEVER

## 2023-03-09 NOTE — PATIENT INSTRUCTIONS
Advance Directives: Care Instructions  Overview  An advance directive is a legal way to state your wishes at the end of your life. It tells your family and your doctor what to do if you can't say what you want. There are two main types of advance directives. You can change them any time your wishes change. Living will. This form tells your family and your doctor your wishes about life support and other treatment. The form is also called a declaration. Medical power of . This form lets you name a person to make treatment decisions for you when you can't speak for yourself. This person is called a health care agent (health care proxy, health care surrogate). The form is also called a durable power of  for health care. If you do not have an advance directive, decisions about your medical care may be made by a family member, or by a doctor or a  who doesn't know you. It may help to think of an advance directive as a gift to the people who care for you. If you have one, they won't have to make tough decisions by themselves. For more information, including forms for your state, see the [address] website (www.caringinfo.org/planning/advance-directives/). Follow-up care is a key part of your treatment and safety. Be sure to make and go to all appointments, and call your doctor if you are having problems. It's also a good idea to know your test results and keep a list of the medicines you take. What should you include in an advance directive? Many states have a unique advance directive form. (It may ask you to address specific issues.) Or you might use a universal form that's approved by many states. If your form doesn't tell you what to address, it may be hard to know what to include in your advance directive. Use the questions below to help you get started. Who do you want to make decisions about your medical care if you are not able to?   What life-support measures do you want if you have a serious illness that gets worse over time or can't be cured? What are you most afraid of that might happen? (Maybe you're afraid of having pain, losing your independence, or being kept alive by machines.)  Where would you prefer to die? (Your home? A hospital? A nursing home?)  Do you want to donate your organs when you die? Do you want certain Gnosticist practices performed before you die? When should you call for help? Be sure to contact your doctor if you have any questions. Where can you learn more? Go to http://www.tariq.com/ and enter R264 to learn more about \"Advance Directives: Care Instructions. \"  Current as of: June 16, 2022               Content Version: 13.5  © 2006-2022 [employer]. Care instructions adapted under license by [facility] ([facility]). If you have questions about a medical condition or this instruction, always ask your healthcare professional. [place] [address] any warranty or liability for your use of this information. A Healthy Heart: Care Instructions  Your Care Instructions     Coronary artery disease, also called heart disease, occurs when a substance called plaque builds up in the vessels that supply oxygen-rich blood to your heart muscle. This can narrow the blood vessels and reduce blood flow. A heart attack happens when blood flow is completely blocked. A high-fat diet, smoking, and other factors increase the risk of heart disease. Your doctor has found that you have a chance of having heart disease. You can do lots of things to keep your heart healthy. It may not be easy, but you can change your diet, exercise more, and quit smoking. These steps really work to lower your chance of heart disease. Follow-up care is a key part of your treatment and safety. Be sure to make and go to all appointments, and call your doctor if you are having problems.  It's also a good idea to know your test results and keep a list of the medicines you take. How can you care for yourself at home? Diet    Use less salt when you cook and eat. This helps lower your blood pressure. Taste food before salting. Add only a little salt when you think you need it. With time, your taste buds will adjust to less salt.     Eat fewer snack items, fast foods, canned soups, and other high-salt, high-fat, processed foods.     Read food labels and try to avoid saturated and trans fats. They increase your risk of heart disease by raising cholesterol levels.     Limit the amount of solid fat-butter, margarine, and shortening-you eat. Use olive, peanut, or canola oil when you cook. Bake, broil, and steam foods instead of frying them.     Eat a variety of fruit and vegetables every day. Dark green, deep orange, red, or yellow fruits and vegetables are especially good for you. Examples include spinach, carrots, peaches, and berries.     Foods high in fiber can reduce your cholesterol and provide important vitamins and minerals. High-fiber foods include whole-grain cereals and breads, oatmeal, beans, brown rice, citrus fruits, and apples.     Eat lean proteins. Heart-healthy proteins include seafood, lean meats and poultry, eggs, beans, peas, nuts, seeds, and soy products.     Limit drinks and foods with added sugar. These include candy, desserts, and soda pop. Lifestyle changes    If your doctor recommends it, get more exercise. Walking is a good choice. Bit by bit, increase the amount you walk every day. Try for at least 30 minutes on most days of the week. You also may want to swim, bike, or do other activities.     Do not smoke. If you need help quitting, talk to your doctor about stop-smoking programs and medicines. These can increase your chances of quitting for good. Quitting smoking may be the most important step you can take to protect your heart. It is never too late to quit.     Limit alcohol to 2 drinks a day for men and 1 drink a day for women.  Too much alcohol can cause health problems.     Manage other health problems such as diabetes, high blood pressure, and high cholesterol. If you think you may have a problem with alcohol or drug use, talk to your doctor. Medicines    Take your medicines exactly as prescribed. Call your doctor if you think you are having a problem with your medicine.     If your doctor recommends aspirin, take the amount directed each day. Make sure you take aspirin and not another kind of pain reliever, such as acetaminophen (Tylenol). When should you call for help? Call 911 if you have symptoms of a heart attack. These may include:    Chest pain or pressure, or a strange feeling in the chest.     Sweating.     Shortness of breath.     Pain, pressure, or a strange feeling in the back, neck, jaw, or upper belly or in one or both shoulders or arms.     Lightheadedness or sudden weakness.     A fast or irregular heartbeat. After you call 911, the  may tell you to chew 1 adult-strength or 2 to 4 low-dose aspirin. Wait for an ambulance. Do not try to drive yourself. Watch closely for changes in your health, and be sure to contact your doctor if you have any problems. Where can you learn more? Go to http://www.tariq.com/ and enter F075 to learn more about \"A Healthy Heart: Care Instructions. \"  Current as of: September 7, 2022               Content Version: 13.5  © 2703-5979 [employer]. Care instructions adapted under license by [facility] ([facility]). If you have questions about a medical condition or this instruction, always ask your healthcare professional. [place] [address] any warranty or liability for your use of this information. Personalized Preventive Plan for Aquilino Kaur - 3/9/2023  Medicare offers a range of preventive health benefits. Some of the tests and screenings are paid in full while other may be subject to a deductible, co-insurance, and/or copay.     Some of these benefits include a comprehensive review of your medical history including lifestyle, illnesses that may run in your family, and various assessments and screenings as appropriate. After reviewing your medical record and screening and assessments performed today your provider may have ordered immunizations, labs, imaging, and/or referrals for you. A list of these orders (if applicable) as well as your Preventive Care list are included within your After Visit Summary for your review. Other Preventive Recommendations:    A preventive eye exam performed by an eye specialist is recommended every 1-2 years to screen for glaucoma; cataracts, macular degeneration, and other eye disorders. A preventive dental visit is recommended every 6 months. Try to get at least 150 minutes of exercise per week or 10,000 steps per day on a pedometer . Order or download the FREE \"Exercise & Physical Activity: Your Everyday Guide\" from [employer]. Call 6-376.942.4080 or search [employer] online. You need 4134-9878 mg of calcium and 2714-7498 IU of vitamin D per day. It is possible to meet your calcium requirement with diet alone, but a vitamin D supplement is usually necessary to meet this goal.  When exposed to the sun, use a sunscreen that protects against both UVA and UVB radiation with an SPF of 30 or greater. Reapply every 2 to 3 hours or after sweating, drying off with a towel, or swimming. Always wear a seat belt when traveling in a car. Always wear a helmet when riding a bicycle or motorcycle.

## 2023-03-09 NOTE — PROGRESS NOTES
3/9/2023 1:09 PM  Location:[facility] [address] [facility]  [place]  Patient #:  [id number]  YOB: 1935          YOUR LAST HEMOGLOBIN A1CS:   No results found for: HBA1C, [id number]    YOUR LAST LIPID PROFILE:   Lab Results   Component Value Date/Time    CHOL 83 08/10/2022 11:18 AM    HDL 27 08/10/2022 11:18 AM    VLDL 23 07/29/2021 12:07 PM         Lab Results   Component Value Date/Time    GFRAA >60 08/10/2022 11:18 AM    BUN 28 12/26/2022 08:50 PM     12/26/2022 08:50 PM    K 3.3 12/26/2022 08:50 PM    CL 99 12/26/2022 08:50 PM    CO2 23 12/26/2022 08:50 PM           History of Present Illness     Chief Complaint   Patient presents with    Medicare AWV     Subsequent; advance directives in wrap up    6 Month Follow-Up     Pt presents to the office today for a 6 month follow-up      Back Pain     Having issues with the sciatica nerve on the left side; been going on for months; the pain comes and goes         This patient required emergency room visit and abscess drainage from a cat bite infection on the left hand at the base of the thumb 2 months ago. He seems to have recovered from that with some stiffness noted. Recently has had increasing pain in the left low back with radicular pain down to the knee. He has no prior history of sciatica. Mr. Lillian Bassett is a 82 y.o. male  who presents for office visit      Allergies   Allergen Reactions    Sulfamethoxazole-Trimethoprim Rash    Meloxicam Itching     Past Medical History:   Diagnosis Date    Borderline diabetes 10/08/2015    diet controlled    CAD (coronary artery disease) 06/2018    NSTEMI, 2 stents    COVID-19 vaccine series completed 03/04/2021    moderna vaccine completed. card scanned under media    Environmental allergies     H/O thromboembolism 4/15/2015    HCAP (healthcare-associated pneumonia) 6/13/2018    Hypertension     managed w/med    Ischemic cardiomyopathy 6/1/2018    1.  LV gram:  EF 40-45% with apical hypokinesis. 2.  Echo (18):  EF 50-55%. No signnificant valve disease. Macular degeneration     injections in eye, [facility]    Osteoarthritis     S/P total knee arthroplasty 4/15/2015    left     S/P total knee arthroplasty 4/15/2015    Status post total left knee replacement 2016    Status post total right knee replacement 2016    Status post total right knee replacement 2016    Superficial incisional infection of surgical site 2021    Thromboembolus ([address] 2002    LLE; due to fall     Social History     Socioeconomic History    Marital status:      Spouse name: None    Number of children: None    Years of education: None    Highest education level: None   Tobacco Use    Smoking status: Former     Packs/day: 1.00     Types: Cigarettes     Quit date: 10/1/1960     Years since quittin.4    Smokeless tobacco: Never   Vaping Use    Vaping Use: Never used   Substance and Sexual Activity    Alcohol use: No    Drug use: No    Sexual activity: Not Currently     Social Determinants of Health     Financial Resource Strain: Low Risk     Difficulty of Paying Living Expenses: Not hard at all   Food Insecurity: No Food Insecurity    Worried About Running Out of Food in the Last Year: Never true    Ran Out of Food in the Last Year: Never true   Transportation Needs: Unknown    Lack of Transportation (Non-Medical):  No   Physical Activity: Insufficiently Active    Days of Exercise per Week: 3 days    Minutes of Exercise per Session: 30 min   Housing Stability: Unknown    Unstable Housing in the Last Year: No     Past Surgical History:   Procedure Laterality Date    CATARACT REMOVAL Right     w/lens implant    COLONOSCOPY      HAND SURGERY Left 2022    Left HAND irrigation and debridement performed by Mike Gallegos MD at [place] [address]  2021    left L3-4 left L4-5 Dr. Snehal Lorenzana stenosis    [address]  2018 heart cath 2 stents    TOTAL KNEE ARTHROPLASTY Right 2016    TOTAL KNEE ARTHROPLASTY Left 4/2015     Current Outpatient Medications   Medication Sig Dispense Refill    carvedilol (COREG) 6.25 MG tablet TAKE ONE TABLET BY MOUTH TWICE A DAY WITH MEALS 180 tablet 3    lisinopril-hydroCHLOROthiazide (PRINZIDE;ZESTORETIC) 20-25 MG per tablet TAKE ONE TABLET BY MOUTH EVERY DAY 90 tablet 3    rosuvastatin (CRESTOR) 40 MG tablet TAKE ONE TABLET BY MOUTH NIGHTLY 90 tablet 3    Multiple Vitamin (MULTIVITAMIN ADULT PO) Take 1 tablet by mouth daily      Multiple Vitamins-Minerals (EYE VITAMINS PO) Take 2 tablets by mouth daily Destiny brand      fluticasone (FLONASE) 50 MCG/ACT nasal spray ADMINISTER 2 SPRAYS EACH NOSTRIL DAILY 1 each 11    aspirin 81 MG EC tablet Take 81 mg by mouth daily      vitamin D (CHOLECALCIFEROL) 25 MCG (1000 UT) TABS tablet Take 1,000 Units by mouth      fexofenadine (ALLEGRA) 180 MG tablet Take 180 mg by mouth daily      latanoprost (XALATAN) 0.005 % ophthalmic solution Place 1 drop into both eyes nightly      nitroGLYCERIN (NITROSTAT) 0.4 MG SL tablet Place 0.4 mg under the tongue every 5 minutes as needed       No current facility-administered medications for this visit.      Health Maintenance   Topic Date Due    Shingles vaccine (1 of 2) Never done    COVID-19 Vaccine (3 - Booster for Moderna series) 04/29/2021    Flu vaccine (1) 08/01/2022    Lipids  08/10/2023    Depression Screen  08/10/2023    Annual Wellness Visit (AWV)  03/09/2024    DTaP/Tdap/Td vaccine (2 - Td or Tdap) 12/26/2032    Pneumococcal 65+ years Vaccine  Completed    Hepatitis A vaccine  Aged Out    Hib vaccine  Aged Out    Meningococcal (ACWY) vaccine  Aged Out     Family History   Problem Relation Age of Onset    Hypertension Mother     Heart Disease Father     Cancer Mother     Diabetes Father              Review of Systems  Review of Systems    /76 (Site: Right Upper Arm)   Pulse 64   Temp 97.4 °F (36.3 °C) (Temporal) Resp 16   Ht 6' (1.829 m)   Wt 206 lb (93.4 kg)   BMI 27.94 kg/m²       Physical Exam    Physical Exam  Constitutional:       General: He is not in acute distress. Appearance: Normal appearance. He is not ill-appearing. HENT:      Head: Normocephalic and atraumatic. Eyes:      Extraocular Movements: Extraocular movements intact. Pupils: Pupils are equal, round, and reactive to light. Cardiovascular:      Rate and Rhythm: Normal rate and regular rhythm. Pulmonary:      Effort: Pulmonary effort is normal.      Breath sounds: Normal breath sounds. Skin:     General: Skin is warm. Neurological:      Mental Status: He is alert. Motor: No weakness. Psychiatric:         Mood and Affect: Mood normal.         Behavior: Behavior normal.         Thought Content: Thought content normal.         Judgment: Judgment normal.       Assessment & Plan    Encounter Diagnoses   Name Primary?     Primary hypertension Yes    Ischemic cardiomyopathy     Coronary artery disease involving native coronary artery of native heart without angina pectoris     Prediabetes     Chronic fatigue, unspecified     Sciatica of left side     Comment: noted for 6 mo     Medicare annual wellness visit, subsequent        Current Outpatient Medications   Medication Sig Dispense Refill    carvedilol (COREG) 6.25 MG tablet TAKE ONE TABLET BY MOUTH TWICE A DAY WITH MEALS 180 tablet 3    lisinopril-hydroCHLOROthiazide (PRINZIDE;ZESTORETIC) 20-25 MG per tablet TAKE ONE TABLET BY MOUTH EVERY DAY 90 tablet 3    rosuvastatin (CRESTOR) 40 MG tablet TAKE ONE TABLET BY MOUTH NIGHTLY 90 tablet 3    Multiple Vitamin (MULTIVITAMIN ADULT PO) Take 1 tablet by mouth daily      Multiple Vitamins-Minerals (EYE VITAMINS PO) Take 2 tablets by mouth daily Destiny brand      fluticasone (FLONASE) 50 MCG/ACT nasal spray ADMINISTER 2 SPRAYS EACH NOSTRIL DAILY 1 each 11    aspirin 81 MG EC tablet Take 81 mg by mouth daily      vitamin D (CHOLECALCIFEROL) 25 MCG (1000 UT) TABS tablet Take 1,000 Units by mouth      fexofenadine (ALLEGRA) 180 MG tablet Take 180 mg by mouth daily      latanoprost (XALATAN) 0.005 % ophthalmic solution Place 1 drop into both eyes nightly      nitroGLYCERIN (NITROSTAT) 0.4 MG SL tablet Place 0.4 mg under the tongue every 5 minutes as needed       No current facility-administered medications for this visit. Orders Placed This Encounter   Procedures    Basic Metabolic Panel     Standing Status:   Future     Number of Occurrences:   1     Standing Expiration Date:   3/9/2024    Lipid Panel     Standing Status:   Future     Number of Occurrences:   1     Standing Expiration Date:   3/9/2024    ALT     Standing Status:   Future     Number of Occurrences:   1     Standing Expiration Date:   3/9/2024    Hemoglobin A1C     Standing Status:   Future     Number of Occurrences:   1     Standing Expiration Date:   3/9/2024    CBC with Auto Differential     Standing Status:   Future     Number of Occurrences:   1     Standing Expiration Date:   3/9/2024       There are no discontinued medications. 1. Primary hypertension  Controlled  - Basic Metabolic Panel; Future  - Lipid Panel; Future  - ALT; Future  - ALT  - Lipid Panel  - Basic Metabolic Panel    2. Ischemic cardiomyopathy  Compensated    3. Coronary artery disease involving native coronary artery of native heart without angina pectoris       4. Prediabetes     - Hemoglobin A1C; Future  - Hemoglobin A1C    5. Chronic fatigue, unspecified     - CBC with Auto Differential; Future  - CBC with Auto Differential    6. Sciatica of left side  We discussed physical therapy and/or MRI he wishes to wait and see him in the pain level progresses over the next week or so before proceeding with either    7. Medicare annual wellness visit, subsequent       6. Sciatica  On left , may call for MRI  No follow-up provider specified.         Ailyn Floyd MD  Medicare Annual Wellness Visit    Flores Simental Dasia Palmer is here for Medicare AWV (Subsequent; advance directives in wrap up), 6 Month Follow-Up (Pt presents to the office today for a 6 month follow-up/), and Back Pain (Having issues with the sciatica nerve on the left side; been going on for months; the pain comes and goes)    Assessment & Plan   Primary hypertension  -     Basic Metabolic Panel; Future  -     Lipid Panel; Future  -     ALT; Future  Ischemic cardiomyopathy  Coronary artery disease involving native coronary artery of native heart without angina pectoris  Prediabetes  -     Hemoglobin A1C; Future  Chronic fatigue, unspecified  -     CBC with Auto Differential; Future  Sciatica of left side  Comments:  noted for 6 mo  Medicare annual wellness visit, subsequent      Recommendations for Preventive Services Due: see orders and patient instructions/AVS.  Recommended screening schedule for the next 5-10 years is provided to the patient in written form: see Patient Instructions/AVS.     No follow-ups on file.     Subjective   The following acute and/or chronic problems were also addressed today:   Diagnosis Orders   1. Primary hypertension  Basic Metabolic Panel    Lipid Panel    ALT    ALT    Lipid Panel    Basic Metabolic Panel      2. Ischemic cardiomyopathy        3. Coronary artery disease involving native coronary artery of native heart without angina pectoris        4. Prediabetes  Hemoglobin A1C    Hemoglobin A1C      5. Chronic fatigue, unspecified  CBC with Auto Differential    CBC with Auto Differential      6. Sciatica of left side      noted for 6 mo      7. Medicare annual wellness visit, subsequent              Patient's complete Health Risk Assessment and screening values have been reviewed and are found in Flowsheets. The following problems were reviewed today and where indicated follow up appointments were made and/or referrals ordered.    Positive Risk Factor Screenings with Interventions:                   Hearing Screen:  Do  you or your family notice any trouble with your hearing that hasn't been managed with hearing aids?: (!) Yes    Interventions:  Patient declines any further evaluation or treatment       Advanced Directives:  Do you have a Living Will?: (!) No    Intervention:  has NO advanced directive - information provided                       Objective   Vitals:    03/09/23 0929 03/09/23 0959   BP: (!) 149/79 130/76   Site: Left Upper Arm Right Upper Arm   Position: Sitting    Cuff Size: Large Adult    Pulse: 64    Resp: 16    Temp: 97.4 °F (36.3 °C)    TempSrc: Temporal    Weight: 206 lb (93.4 kg)    Height: 6' (1.829 m)       Body mass index is 27.94 kg/m². Allergies   Allergen Reactions    Sulfamethoxazole-Trimethoprim Rash    Meloxicam Itching     Prior to Visit Medications    Medication Sig Taking?  Authorizing Provider   carvedilol (COREG) 6.25 MG tablet TAKE ONE TABLET BY MOUTH TWICE A DAY WITH MEALS Yes Kaleb Webb MD   lisinopril-hydroCHLOROthiazide (PRINZIDE;ZESTORETIC) 20-25 MG per tablet TAKE ONE TABLET BY MOUTH EVERY DAY Yes Kaleb Webb MD   rosuvastatin (CRESTOR) 40 MG tablet TAKE ONE TABLET BY MOUTH NIGHTLY Yes Kaleb Webb MD   Multiple Vitamin (MULTIVITAMIN ADULT PO) Take 1 tablet by mouth daily Yes Historical Provider, MD   Multiple Vitamins-Minerals (EYE VITAMINS PO) Take 2 tablets by mouth daily Destiny brand Yes Historical Provider, MD   fluticasone (FLONASE) 50 MCG/ACT nasal spray ADMINISTER 2 SPRAYS EACH NOSTRIL DAILY Yes Ailyn Floyd MD   aspirin 81 MG EC tablet Take 81 mg by mouth daily Yes Ar Automatic Reconciliation   vitamin D (CHOLECALCIFEROL) 25 MCG (1000 UT) TABS tablet Take 1,000 Units by mouth Yes Ar Automatic Reconciliation   fexofenadine (ALLEGRA) 180 MG tablet Take 180 mg by mouth daily Yes Ar Automatic Reconciliation   latanoprost (XALATAN) 0.005 % ophthalmic solution Place 1 drop into both eyes nightly Yes Ar Automatic Reconciliation   nitroGLYCERIN (NITROSTAT) 0.4 MG SL tablet Place 0.4 mg under the tongue every 5 minutes as needed Yes Ar Automatic Reconciliation       CareTeam (Including outside providers/suppliers regularly involved in providing care):   Patient Care Team:  Luis F Cisneros MD as PCP - Cristin Hamm MD as PCP - Empaneled Provider     Reviewed and updated this visit:  Tobacco  Allergies  Meds  Med Hx  Surg Hx  Soc Hx  Fam Hx             Luis F Cisneros MD

## 2023-03-20 ENCOUNTER — OFFICE VISIT (OUTPATIENT)
Dept: CARDIOLOGY CLINIC | Age: 88
End: 2023-03-20
Payer: MEDICARE

## 2023-03-20 VITALS
WEIGHT: 210 LBS | BODY MASS INDEX: 28.44 KG/M2 | DIASTOLIC BLOOD PRESSURE: 64 MMHG | HEART RATE: 75 BPM | HEIGHT: 72 IN | SYSTOLIC BLOOD PRESSURE: 132 MMHG

## 2023-03-20 DIAGNOSIS — I25.10 CORONARY ARTERY DISEASE INVOLVING NATIVE CORONARY ARTERY OF NATIVE HEART WITHOUT ANGINA PECTORIS: Primary | ICD-10-CM

## 2023-03-20 DIAGNOSIS — I25.5 ISCHEMIC CARDIOMYOPATHY: ICD-10-CM

## 2023-03-20 DIAGNOSIS — I10 PRIMARY HYPERTENSION: ICD-10-CM

## 2023-03-20 DIAGNOSIS — E78.5 DYSLIPIDEMIA: ICD-10-CM

## 2023-03-20 PROCEDURE — 93000 ELECTROCARDIOGRAM COMPLETE: CPT | Performed by: INTERNAL MEDICINE

## 2023-03-20 PROCEDURE — 99214 OFFICE O/P EST MOD 30 MIN: CPT | Performed by: INTERNAL MEDICINE

## 2023-03-20 PROCEDURE — 3075F SYST BP GE 130 - 139MM HG: CPT | Performed by: INTERNAL MEDICINE

## 2023-03-20 PROCEDURE — 1123F ACP DISCUSS/DSCN MKR DOCD: CPT | Performed by: INTERNAL MEDICINE

## 2023-03-20 PROCEDURE — G8484 FLU IMMUNIZE NO ADMIN: HCPCS | Performed by: INTERNAL MEDICINE

## 2023-03-20 PROCEDURE — G8417 CALC BMI ABV UP PARAM F/U: HCPCS | Performed by: INTERNAL MEDICINE

## 2023-03-20 PROCEDURE — 1036F TOBACCO NON-USER: CPT | Performed by: INTERNAL MEDICINE

## 2023-03-20 PROCEDURE — 3078F DIAST BP <80 MM HG: CPT | Performed by: INTERNAL MEDICINE

## 2023-03-20 PROCEDURE — G8427 DOCREV CUR MEDS BY ELIG CLIN: HCPCS | Performed by: INTERNAL MEDICINE

## 2023-03-20 ASSESSMENT — ENCOUNTER SYMPTOMS: SHORTNESS OF BREATH: 0

## 2023-03-20 NOTE — PROGRESS NOTES
03/09/2023     Lab Results   Component Value Date    HDL 34 (L) 03/09/2023     Lab Results   Component Value Date    LDLCALC 44.4 03/09/2023     Lab Results   Component Value Date    LABVLDL 21.6 03/09/2023    VLDL 23 07/29/2021     Lab Results   Component Value Date    CHOLHDLRATIO 2.9 03/09/2023        Data from outside records/labs from outside providers have been reviewed and summarized as noted in the HPI, past history and data review sections of this note       ASSESSMENT and PLAN      1. Coronary artery disease involving native coronary artery of native heart  Patient is doing well without any anginal symptoms. Continue Aspirin 81 mg a day and PRN NTG. We discussed the importance of continued risk factor modification. The patient is encouraged to contact my office with any worsening dyspnea or chest discomfort. - EKG 12 lead  - Transthoracic echocardiogram (TTE) complete with contrast, bubble, strain, and 3D PRN; Future    2. Ischemic cardiomyopathy  Check echo. - Transthoracic echocardiogram (TTE) complete with contrast, bubble, strain, and 3D PRN; Future    3. Primary hypertension  BP controlled. Continue carvedilol and lisinopril/HCTZ. Monitor blood pressure at home and call if becomes uncontrolled. - Transthoracic echocardiogram (TTE) complete with contrast, bubble, strain, and 3D PRN; Future    4. Dyslipidemia  Continue Crestor. Lipids at goal.   - Transthoracic echocardiogram (TTE) complete with contrast, bubble, strain, and 3D PRN; Future           Return in about 1 year (around 3/20/2024). Manuel Staton MD  3/20/2023  9:01 AM    This note may have been dictated using speech recognition software.   As a result, error of speech recognition may have occurred

## 2023-09-20 ENCOUNTER — OFFICE VISIT (OUTPATIENT)
Dept: INTERNAL MEDICINE CLINIC | Facility: CLINIC | Age: 88
End: 2023-09-20
Payer: MEDICARE

## 2023-09-20 VITALS
WEIGHT: 210.6 LBS | DIASTOLIC BLOOD PRESSURE: 60 MMHG | BODY MASS INDEX: 28.53 KG/M2 | RESPIRATION RATE: 16 BRPM | HEART RATE: 88 BPM | SYSTOLIC BLOOD PRESSURE: 128 MMHG | TEMPERATURE: 98.2 F | HEIGHT: 72 IN

## 2023-09-20 DIAGNOSIS — I10 PRIMARY HYPERTENSION: ICD-10-CM

## 2023-09-20 DIAGNOSIS — E78.5 DYSLIPIDEMIA: ICD-10-CM

## 2023-09-20 DIAGNOSIS — I25.10 CORONARY ARTERY DISEASE INVOLVING NATIVE CORONARY ARTERY OF NATIVE HEART WITHOUT ANGINA PECTORIS: ICD-10-CM

## 2023-09-20 DIAGNOSIS — G89.29 CHRONIC BILATERAL LOW BACK PAIN WITHOUT SCIATICA: ICD-10-CM

## 2023-09-20 DIAGNOSIS — R73.03 PREDIABETES: ICD-10-CM

## 2023-09-20 DIAGNOSIS — L98.9 FACIAL LESION: Primary | ICD-10-CM

## 2023-09-20 DIAGNOSIS — I25.5 ISCHEMIC CARDIOMYOPATHY: ICD-10-CM

## 2023-09-20 DIAGNOSIS — M54.50 CHRONIC BILATERAL LOW BACK PAIN WITHOUT SCIATICA: ICD-10-CM

## 2023-09-20 LAB
ANION GAP SERPL CALC-SCNC: 6 MMOL/L (ref 2–11)
BUN SERPL-MCNC: 17 MG/DL (ref 8–23)
CALCIUM SERPL-MCNC: 9.2 MG/DL (ref 8.3–10.4)
CHLORIDE SERPL-SCNC: 103 MMOL/L (ref 101–110)
CO2 SERPL-SCNC: 27 MMOL/L (ref 21–32)
CREAT SERPL-MCNC: 1 MG/DL (ref 0.8–1.5)
EST. AVERAGE GLUCOSE BLD GHB EST-MCNC: 126 MG/DL
GLUCOSE SERPL-MCNC: 103 MG/DL (ref 65–100)
HBA1C MFR BLD: 6 % (ref 4.8–5.6)
POTASSIUM SERPL-SCNC: 4.3 MMOL/L (ref 3.5–5.1)
SODIUM SERPL-SCNC: 136 MMOL/L (ref 133–143)

## 2023-09-20 PROCEDURE — 1036F TOBACCO NON-USER: CPT | Performed by: INTERNAL MEDICINE

## 2023-09-20 PROCEDURE — 1123F ACP DISCUSS/DSCN MKR DOCD: CPT | Performed by: INTERNAL MEDICINE

## 2023-09-20 PROCEDURE — 99214 OFFICE O/P EST MOD 30 MIN: CPT | Performed by: INTERNAL MEDICINE

## 2023-09-20 PROCEDURE — G8417 CALC BMI ABV UP PARAM F/U: HCPCS | Performed by: INTERNAL MEDICINE

## 2023-09-20 PROCEDURE — G8428 CUR MEDS NOT DOCUMENT: HCPCS | Performed by: INTERNAL MEDICINE

## 2023-09-20 NOTE — PROGRESS NOTES
9/20/2023 2:06 PM  Location:[address] [facility]  [place]  Patient #:  [id number]  YOB: 1935          YOUR LAST HEMOGLOBIN A1CS:   No results found for: \"HBA1C\", \"[id number]\"    YOUR LAST LIPID PROFILE:   Lab Results   Component Value Date/Time    CHOL 100 03/09/2023 10:20 AM    HDL 34 03/09/2023 10:20 AM    VLDL 23 07/29/2021 12:07 PM         Lab Results   Component Value Date/Time    GFRAA >60 08/10/2022 11:18 AM    BUN 24 03/09/2023 10:20 AM     03/09/2023 10:20 AM    K 4.1 03/09/2023 10:20 AM     03/09/2023 10:20 AM    CO2 26 03/09/2023 10:20 AM           History of Present Illness     Chief Complaint   Patient presents with    6 Month Follow-Up     Pt presents to the office today for a 6 month follow-up      Skin Problem     Have a place on the left side of the face, he would like for the doctor to check   This patient says he is doing relatively well overall except for lightheadedness when he leans forward. He has had no falls he does state that he has developed a nodule on the left cheek that has been noted for the last 6 months. Mr. Victor Hugo Tsai is a 82 y.o. male  who presents for office visit      Allergies   Allergen Reactions    Sulfamethoxazole-Trimethoprim Rash    Meloxicam Itching     Past Medical History:   Diagnosis Date    Borderline diabetes 10/08/2015    diet controlled    CAD (coronary artery disease) 06/2018    NSTEMI, 2 stents    COVID-19 vaccine series completed 03/04/2021    moderna vaccine completed. card scanned under media    Environmental allergies     H/O thromboembolism 4/15/2015    HCAP (healthcare-associated pneumonia) 6/13/2018    Hypertension     managed w/med    Ischemic cardiomyopathy 6/1/2018    1. LV gram:  EF 40-45% with apical hypokinesis. 2.  Echo (6/2/18):  EF 50-55%. No signnificant valve disease.      Macular degeneration     injections in eye, [facility]    Osteoarthritis     S/P total

## 2024-02-02 RX ORDER — CARVEDILOL 6.25 MG/1
TABLET ORAL
Qty: 180 TABLET | Refills: 3 | Status: SHIPPED | OUTPATIENT
Start: 2024-02-02

## 2024-02-02 RX ORDER — LISINOPRIL AND HYDROCHLOROTHIAZIDE 25; 20 MG/1; MG/1
TABLET ORAL
Qty: 90 TABLET | Refills: 3 | Status: SHIPPED | OUTPATIENT
Start: 2024-02-02

## 2024-02-02 RX ORDER — ROSUVASTATIN CALCIUM 40 MG/1
TABLET, COATED ORAL
Qty: 90 TABLET | Refills: 3 | Status: SHIPPED | OUTPATIENT
Start: 2024-02-02

## 2024-02-02 NOTE — TELEPHONE ENCOUNTER
Prescriptions sent to [facility] pharmacy[facility]  Requested Prescriptions     Signed Prescriptions Disp Refills    carvedilol (COREG) 6.25 MG tablet 180 tablet 3     Sig: TAKE ONE TABLET BY MOUTH TWICE A DAY WITH MEALS     Authorizing Provider: MEERA TAVERAS     Ordering User: SYD HER    lisinopril-hydroCHLOROthiazide (PRINZIDE;ZESTORETIC) 20-25 MG per tablet 90 tablet 3     Sig: TAKE ONE TABLET BY MOUTH EVERY DAY     Authorizing Provider: MEERA TAVERAS     Ordering User: SYD HER    rosuvastatin (CRESTOR) 40 MG tablet 90 tablet 3     Sig: TAKE ONE TABLET BY MOUTH EVERY DAY NIGHTLY     Authorizing Provider: MEERA TAVERAS     Ordering User: SYD HER

## 2024-02-06 ENCOUNTER — TELEPHONE (OUTPATIENT)
Age: 89
End: 2024-02-06

## 2024-02-06 NOTE — TELEPHONE ENCOUNTER
MEDICATION REFILL REQUEST      Name of Medication:  carvedilol (COREG) 6.25 MG tablet   Dose:    Frequency:    Quantity:    Days' supply:  90      Ph[facility]Location[facility]    MEDICATION REFILL REQUEST      Name of Medication:  isinopril-hydroCHLOROthiazide (PRINZIDE;ZESTORETIC) 20-25 MG per tablet   Dose:    Frequency:    Quantity:    Days' supply:  90      Pharmacy Name/Location:  [facility]    MEDICATION REFILL REQUEST      Name of Medication:  rosuvastatin (CRESTOR) 40 MG tablet   Dose:    Frequency:    Quantity:    Days' supply:  90      Pharmacy Name/Location:  [facility]

## 2024-03-22 ENCOUNTER — OFFICE VISIT (OUTPATIENT)
Dept: INTERNAL MEDICINE CLINIC | Facility: CLINIC | Age: 89
End: 2024-03-22
Payer: MEDICARE

## 2024-03-22 VITALS
HEIGHT: 72 IN | SYSTOLIC BLOOD PRESSURE: 128 MMHG | TEMPERATURE: 97.2 F | DIASTOLIC BLOOD PRESSURE: 62 MMHG | WEIGHT: 209.8 LBS | BODY MASS INDEX: 28.42 KG/M2 | OXYGEN SATURATION: 99 % | RESPIRATION RATE: 16 BRPM | HEART RATE: 72 BPM

## 2024-03-22 DIAGNOSIS — I25.5 ISCHEMIC CARDIOMYOPATHY: ICD-10-CM

## 2024-03-22 DIAGNOSIS — Z23 NEED FOR PROPHYLACTIC VACCINATION AGAINST STREPTOCOCCUS PNEUMONIAE (PNEUMOCOCCUS): ICD-10-CM

## 2024-03-22 DIAGNOSIS — E55.9 VITAMIN D DEFICIENCY: ICD-10-CM

## 2024-03-22 DIAGNOSIS — I25.10 CORONARY ARTERY DISEASE INVOLVING NATIVE CORONARY ARTERY OF NATIVE HEART WITHOUT ANGINA PECTORIS: ICD-10-CM

## 2024-03-22 DIAGNOSIS — R73.03 PREDIABETES: ICD-10-CM

## 2024-03-22 DIAGNOSIS — H35.30 MACULAR DEGENERATION, UNSPECIFIED LATERALITY, UNSPECIFIED TYPE: ICD-10-CM

## 2024-03-22 DIAGNOSIS — Z00.00 MEDICARE ANNUAL WELLNESS VISIT, SUBSEQUENT: ICD-10-CM

## 2024-03-22 DIAGNOSIS — I10 PRIMARY HYPERTENSION: Primary | ICD-10-CM

## 2024-03-22 DIAGNOSIS — E78.5 DYSLIPIDEMIA: ICD-10-CM

## 2024-03-22 LAB
25(OH)D3 SERPL-MCNC: 56 NG/ML (ref 30–100)
ALT SERPL-CCNC: 22 U/L (ref 12–65)
ANION GAP SERPL CALC-SCNC: 5 MMOL/L (ref 2–11)
BUN SERPL-MCNC: 28 MG/DL (ref 8–23)
CALCIUM SERPL-MCNC: 9.8 MG/DL (ref 8.3–10.4)
CHLORIDE SERPL-SCNC: 99 MMOL/L (ref 103–113)
CHOLEST SERPL-MCNC: 99 MG/DL
CO2 SERPL-SCNC: 29 MMOL/L (ref 21–32)
CREAT SERPL-MCNC: 1.2 MG/DL (ref 0.8–1.5)
EST. AVERAGE GLUCOSE BLD GHB EST-MCNC: 117 MG/DL
GLUCOSE SERPL-MCNC: 107 MG/DL (ref 65–100)
HBA1C MFR BLD: 5.7 % (ref 4.8–5.6)
HDLC SERPL-MCNC: 33 MG/DL (ref 40–60)
HDLC SERPL: 3
LDLC SERPL CALC-MCNC: 30.2 MG/DL
POTASSIUM SERPL-SCNC: 3.8 MMOL/L (ref 3.5–5.1)
SODIUM SERPL-SCNC: 133 MMOL/L (ref 136–146)
TRIGL SERPL-MCNC: 179 MG/DL (ref 35–150)
VLDLC SERPL CALC-MCNC: 35.8 MG/DL (ref 6–23)

## 2024-03-22 PROCEDURE — G8417 CALC BMI ABV UP PARAM F/U: HCPCS | Performed by: INTERNAL MEDICINE

## 2024-03-22 PROCEDURE — G0009 ADMIN PNEUMOCOCCAL VACCINE: HCPCS | Performed by: INTERNAL MEDICINE

## 2024-03-22 PROCEDURE — 99213 OFFICE O/P EST LOW 20 MIN: CPT | Performed by: INTERNAL MEDICINE

## 2024-03-22 PROCEDURE — G0439 PPPS, SUBSEQ VISIT: HCPCS | Performed by: INTERNAL MEDICINE

## 2024-03-22 PROCEDURE — 1123F ACP DISCUSS/DSCN MKR DOCD: CPT | Performed by: INTERNAL MEDICINE

## 2024-03-22 PROCEDURE — G8427 DOCREV CUR MEDS BY ELIG CLIN: HCPCS | Performed by: INTERNAL MEDICINE

## 2024-03-22 PROCEDURE — G8484 FLU IMMUNIZE NO ADMIN: HCPCS | Performed by: INTERNAL MEDICINE

## 2024-03-22 PROCEDURE — 1036F TOBACCO NON-USER: CPT | Performed by: INTERNAL MEDICINE

## 2024-03-22 PROCEDURE — 90677 PCV20 VACCINE IM: CPT | Performed by: INTERNAL MEDICINE

## 2024-03-22 SDOH — ECONOMIC STABILITY: FOOD INSECURITY: WITHIN THE PAST 12 MONTHS, THE FOOD YOU BOUGHT JUST DIDN'T LAST AND YOU DIDN'T HAVE MONEY TO GET MORE.: NEVER TRUE

## 2024-03-22 SDOH — ECONOMIC STABILITY: FOOD INSECURITY: WITHIN THE PAST 12 MONTHS, YOU WORRIED THAT YOUR FOOD WOULD RUN OUT BEFORE YOU GOT MONEY TO BUY MORE.: NEVER TRUE

## 2024-03-22 SDOH — ECONOMIC STABILITY: INCOME INSECURITY: HOW HARD IS IT FOR YOU TO PAY FOR THE VERY BASICS LIKE FOOD, HOUSING, MEDICAL CARE, AND HEATING?: NOT HARD AT ALL

## 2024-03-22 ASSESSMENT — PATIENT HEALTH QUESTIONNAIRE - PHQ9
SUM OF ALL RESPONSES TO PHQ9 QUESTIONS 1 & 2: 0
1. LITTLE INTEREST OR PLEASURE IN DOING THINGS: NOT AT ALL
SUM OF ALL RESPONSES TO PHQ QUESTIONS 1-9: 0
2. FEELING DOWN, DEPRESSED OR HOPELESS: NOT AT ALL

## 2024-03-22 NOTE — PROGRESS NOTES
(EYE VITAMINS PO) Take 2 tablets by mouth daily Destiny brand Yes Provider, MD Argelia   fluticasone (FLONASE) 50 MCG/ACT nasal spray ADMINISTER 2 SPRAYS EACH NOSTRIL DAILY Yes Bunny Galaviz MD   aspirin 81 MG EC tablet Take 1 tablet by mouth daily Yes Automatic Reconciliation, Ar   vitamin D (CHOLECALCIFEROL) 25 MCG (1000 UT) TABS tablet Take 1 tablet by mouth daily Yes Automatic Reconciliation, Ar   fexofenadine (ALLEGRA) 180 MG tablet Take 1 tablet by mouth daily Yes Automatic Reconciliation, Ar   latanoprost (XALATAN) 0.005 % ophthalmic solution Place 1 drop into both eyes nightly Yes Automatic Reconciliation, Ar   nitroGLYCERIN (NITROSTAT) 0.4 MG SL tablet Place 1 tablet under the tongue every 5 minutes as needed Yes Automatic Reconciliation, Ar       CareTeam (Including outside providers/suppliers regularly involved in providing care):   Patient Care Team:  Bunny Galaviz MD as PCP - General  Bunny Galaviz MD as PCP - Empaneled Provider     Reviewed and updated this visit:  Tobacco  Allergies  Med Hx  Surg Hx  Soc Hx  Fam Hx

## 2024-03-22 NOTE — PATIENT INSTRUCTIONS

## 2024-03-28 ENCOUNTER — OFFICE VISIT (OUTPATIENT)
Age: 89
End: 2024-03-28
Payer: MEDICARE

## 2024-03-28 VITALS
HEIGHT: 72 IN | BODY MASS INDEX: 28.44 KG/M2 | DIASTOLIC BLOOD PRESSURE: 66 MMHG | HEART RATE: 86 BPM | SYSTOLIC BLOOD PRESSURE: 132 MMHG | WEIGHT: 210 LBS

## 2024-03-28 DIAGNOSIS — I25.10 CORONARY ARTERY DISEASE INVOLVING NATIVE CORONARY ARTERY OF NATIVE HEART WITHOUT ANGINA PECTORIS: Primary | ICD-10-CM

## 2024-03-28 DIAGNOSIS — I10 PRIMARY HYPERTENSION: ICD-10-CM

## 2024-03-28 DIAGNOSIS — E78.5 DYSLIPIDEMIA: ICD-10-CM

## 2024-03-28 DIAGNOSIS — I25.5 ISCHEMIC CARDIOMYOPATHY: ICD-10-CM

## 2024-03-28 PROCEDURE — 99214 OFFICE O/P EST MOD 30 MIN: CPT | Performed by: INTERNAL MEDICINE

## 2024-03-28 PROCEDURE — 1123F ACP DISCUSS/DSCN MKR DOCD: CPT | Performed by: INTERNAL MEDICINE

## 2024-03-28 PROCEDURE — G8484 FLU IMMUNIZE NO ADMIN: HCPCS | Performed by: INTERNAL MEDICINE

## 2024-03-28 PROCEDURE — 1036F TOBACCO NON-USER: CPT | Performed by: INTERNAL MEDICINE

## 2024-03-28 PROCEDURE — G8417 CALC BMI ABV UP PARAM F/U: HCPCS | Performed by: INTERNAL MEDICINE

## 2024-03-28 PROCEDURE — G8427 DOCREV CUR MEDS BY ELIG CLIN: HCPCS | Performed by: INTERNAL MEDICINE

## 2024-03-28 PROCEDURE — 93000 ELECTROCARDIOGRAM COMPLETE: CPT | Performed by: INTERNAL MEDICINE

## 2024-03-28 RX ORDER — CARVEDILOL 6.25 MG/1
6.25 TABLET ORAL 2 TIMES DAILY WITH MEALS
Qty: 180 TABLET | Refills: 3 | Status: SHIPPED | OUTPATIENT
Start: 2024-03-28

## 2024-03-28 RX ORDER — ROSUVASTATIN CALCIUM 40 MG/1
TABLET, COATED ORAL
Qty: 90 TABLET | Refills: 3 | Status: SHIPPED | OUTPATIENT
Start: 2024-03-28

## 2024-03-28 RX ORDER — LISINOPRIL AND HYDROCHLOROTHIAZIDE 25; 20 MG/1; MG/1
1 TABLET ORAL DAILY
Qty: 90 TABLET | Refills: 3 | Status: SHIPPED | OUTPATIENT
Start: 2024-03-28

## 2024-03-28 ASSESSMENT — ENCOUNTER SYMPTOMS: SHORTNESS OF BREATH: 0

## 2024-03-28 NOTE — PROGRESS NOTES
[address], [address]  [place] [address]  PHONE: [phone]    Luke Palmer  1935      SUBJECTIVE:   Luke Palmer is a 88 y.o. male seen for a follow up visit regarding the following:     Chief Complaint   Patient presents with    Coronary Artery Disease       HPI:    Luke Palmer presents for routine follow up for known Coronary Artery Disease. Multiple issues addressed as outlined below:     Coronary Artery Disease:  Patient denies any recent angina.  Notes compliance with medical therapy.  No recent NTG use.  No intolerance to anti-platelet therapy. No blood in stool or melena.     Hypertension:  Ambulatory BP readings have been controlled.  Patient reports compliance with medical therapy without side effects.      Hyperlipidemia:   Tolerating Crestor.      Latest Reference Range & Units 03/22/24 11:07   Cholesterol, Total <200 MG/DL 99   HDL Cholesterol 40 - 60 MG/DL 33 (L)   LDL Calculated <100 MG/DL 30.2   Triglycerides 35 - 150 MG/ (H)       Ischemic cardiomyopathy:  Recent echo  Echo (3/20/24):  EF 50-55%.  Mild hypertrophy.  Normal diastolic function. No significant valve disease     Past Medical History, Past Surgical History, Family history, Social History, and Medications were all reviewed with the patient today and updated as necessary.           Current Outpatient Medications:     carvedilol (COREG) 6.25 MG tablet, Take 1 tablet by mouth 2 times daily (with meals) with meals, Disp: 180 tablet, Rfl: 3    lisinopril-hydroCHLOROthiazide (PRINZIDE;ZESTORETIC) 20-25 MG per tablet, Take 1 tablet by mouth daily, Disp: 90 tablet, Rfl: 3    rosuvastatin (CRESTOR) 40 MG tablet, TAKE ONE TABLET BY MOUTH EVERY DAY NIGHTLY, Disp: 90 tablet, Rfl: 3    Multiple Vitamin (MULTIVITAMIN ADULT PO), Take 1 tablet by mouth daily, Disp: , Rfl:     Multiple Vitamins-Minerals (EYE VITAMINS PO), Take 2 tablets by mouth daily Destiny brand, Disp: , Rfl:

## 2024-05-02 ENCOUNTER — OFFICE VISIT (OUTPATIENT)
Dept: INTERNAL MEDICINE CLINIC | Facility: CLINIC | Age: 89
End: 2024-05-02
Payer: MEDICARE

## 2024-05-02 VITALS
WEIGHT: 211.4 LBS | HEART RATE: 63 BPM | HEIGHT: 72 IN | TEMPERATURE: 97.9 F | RESPIRATION RATE: 16 BRPM | SYSTOLIC BLOOD PRESSURE: 139 MMHG | DIASTOLIC BLOOD PRESSURE: 66 MMHG | BODY MASS INDEX: 28.63 KG/M2

## 2024-05-02 DIAGNOSIS — G89.29 CHRONIC BILATERAL LOW BACK PAIN WITHOUT SCIATICA: ICD-10-CM

## 2024-05-02 DIAGNOSIS — I10 PRIMARY HYPERTENSION: ICD-10-CM

## 2024-05-02 DIAGNOSIS — E78.5 DYSLIPIDEMIA: ICD-10-CM

## 2024-05-02 DIAGNOSIS — I25.10 CORONARY ARTERY DISEASE INVOLVING NATIVE CORONARY ARTERY OF NATIVE HEART WITHOUT ANGINA PECTORIS: Primary | ICD-10-CM

## 2024-05-02 DIAGNOSIS — R73.03 PREDIABETES: ICD-10-CM

## 2024-05-02 DIAGNOSIS — M54.50 CHRONIC BILATERAL LOW BACK PAIN WITHOUT SCIATICA: ICD-10-CM

## 2024-05-02 DIAGNOSIS — I25.5 ISCHEMIC CARDIOMYOPATHY: ICD-10-CM

## 2024-05-02 PROCEDURE — G8417 CALC BMI ABV UP PARAM F/U: HCPCS | Performed by: INTERNAL MEDICINE

## 2024-05-02 PROCEDURE — 99213 OFFICE O/P EST LOW 20 MIN: CPT | Performed by: INTERNAL MEDICINE

## 2024-05-02 PROCEDURE — G8428 CUR MEDS NOT DOCUMENT: HCPCS | Performed by: INTERNAL MEDICINE

## 2024-05-02 PROCEDURE — 1123F ACP DISCUSS/DSCN MKR DOCD: CPT | Performed by: INTERNAL MEDICINE

## 2024-05-02 PROCEDURE — 1036F TOBACCO NON-USER: CPT | Performed by: INTERNAL MEDICINE

## 2024-05-02 RX ORDER — METHYLPREDNISOLONE 4 MG/1
4 TABLET ORAL SEE ADMIN INSTRUCTIONS
Qty: 1 KIT | Refills: 0 | Status: SHIPPED | OUTPATIENT
Start: 2024-05-02

## 2024-05-02 RX ORDER — OXYCODONE HYDROCHLORIDE 5 MG/1
5 TABLET ORAL EVERY 8 HOURS PRN
Qty: 30 TABLET | Refills: 0 | Status: SHIPPED | OUTPATIENT
Start: 2024-05-02 | End: 2024-05-12

## 2024-05-02 NOTE — PROGRESS NOTES
5/2/2024 1:35 PM  Location:[facility] PHYSICIAN SERVICES  [facility]  [place]  Patient #:  [id number]  YOB: 1935          YOUR LAST HEMOGLOBIN A1CS:   No results found for: \"HBA1C\", \"[id number]\"    YOUR LAST LIPID PROFILE:   Lab Results   Component Value Date/Time    CHOL 99 03/22/2024 11:07 AM    HDL 33 03/22/2024 11:07 AM    LDL 30.2 03/22/2024 11:07 AM    VLDL 35.8 03/22/2024 11:07 AM    VLDL 23 07/29/2021 12:07 PM         Lab Results   Component Value Date/Time    GFRAA >60 08/10/2022 11:18 AM    BUN 28 03/22/2024 11:07 AM     03/22/2024 11:07 AM    K 3.8 03/22/2024 11:07 AM    CL 99 03/22/2024 11:07 AM    CO2 29 03/22/2024 11:07 AM           History of Present Illness     Chief Complaint   Patient presents with   • Back Pain     Pt c/o having back pain in the lower part. The  pain goes down into the legs  Started last Monday  Had back surgery 6 yrs ago, radicular down right leg     This patient developed low back pain proximately 2 weeks ago after trying to move his riding lawnmower.  He has a history of LS-spine surgery 6 years ago.  His pain does radiate into the upper legs syndrome more so on the right.  It has improved slightly over the past few days.  He has had no falls he does describe some mild leg weakness mild though he does have a history of left foot drop.  Mr. Palmer is a 88 y.o. male  who presents for office visit      Allergies   Allergen Reactions   • Sulfamethoxazole-Trimethoprim Rash   • Meloxicam Itching     Past Medical History:   Diagnosis Date   • Borderline diabetes 10/08/2015    diet controlled   • CAD (coronary artery disease) 06/2018    NSTEMI, 2 stents   • COVID-19 vaccine series completed 03/04/2021    moderna vaccine completed.  card scanned under media   • Environmental allergies    • H/O thromboembolism 4/15/2015   • HCAP (healthcare-associated pneumonia) 6/13/2018   • Hypertension     managed w/med   • Ischemic cardiomyopathy 6/1/2018    1. 
no

## 2024-05-20 ENCOUNTER — TELEPHONE (OUTPATIENT)
Dept: INTERNAL MEDICINE CLINIC | Facility: CLINIC | Age: 89
End: 2024-05-20

## 2024-05-20 DIAGNOSIS — G89.29 CHRONIC BILATERAL LOW BACK PAIN WITH BILATERAL SCIATICA: Primary | ICD-10-CM

## 2024-05-20 DIAGNOSIS — M54.42 CHRONIC BILATERAL LOW BACK PAIN WITH BILATERAL SCIATICA: Primary | ICD-10-CM

## 2024-05-20 DIAGNOSIS — M54.41 CHRONIC BILATERAL LOW BACK PAIN WITH BILATERAL SCIATICA: Primary | ICD-10-CM

## 2024-05-20 DIAGNOSIS — M54.42 CHRONIC MIDLINE LOW BACK PAIN WITH BILATERAL SCIATICA: Primary | ICD-10-CM

## 2024-05-20 DIAGNOSIS — G89.29 CHRONIC MIDLINE LOW BACK PAIN WITH BILATERAL SCIATICA: Primary | ICD-10-CM

## 2024-05-20 DIAGNOSIS — M54.41 CHRONIC MIDLINE LOW BACK PAIN WITH BILATERAL SCIATICA: Primary | ICD-10-CM

## 2024-05-20 NOTE — TELEPHONE ENCOUNTER
Called and spoke with pt's wife. He saw CMS on 5/2/2024 for low back pain  The pain is not as severe as it was. He finished the prednisone pack.  He is having difficulty walking. He is very unsteady. No falls.    Per pt's wife, CMS told them to call if he dont improve and he will order a MRI.  Pt is requesting a MRI to be done at [facility].

## 2024-05-20 NOTE — TELEPHONE ENCOUNTER
----- Message from Nicolette Lighthal sent at 5/17/2024 11:14 AM EDT -----  Regarding: [facility] Referral Request  [facility] Referral Request    Reason for referral request: Lab/Test Order    Specialist/Lab/Test patient is requesting (if known): MRI Scan     Specialist Phone Number (if applicable):      --------------------------------------------------------------------------------------------------------------------------    Relationship to Patient: Spouse/Partner     Call Back Information: OK to leave message on voicemail  Preferred Call Back Number: Phone  [phone]

## 2024-06-04 ENCOUNTER — HOSPITAL ENCOUNTER (OUTPATIENT)
Dept: MRI IMAGING | Age: 89
Discharge: HOME | End: 2024-06-07
Attending: INTERNAL MEDICINE
Payer: MEDICARE

## 2024-06-04 DIAGNOSIS — M54.41 CHRONIC MIDLINE LOW BACK PAIN WITH BILATERAL SCIATICA: ICD-10-CM

## 2024-06-04 DIAGNOSIS — G89.29 CHRONIC MIDLINE LOW BACK PAIN WITH BILATERAL SCIATICA: ICD-10-CM

## 2024-06-04 DIAGNOSIS — M54.42 CHRONIC MIDLINE LOW BACK PAIN WITH BILATERAL SCIATICA: ICD-10-CM

## 2024-06-04 PROCEDURE — 72148 MRI LUMBAR SPINE W/O DYE: CPT

## 2024-06-07 ENCOUNTER — TELEPHONE (OUTPATIENT)
Dept: INTERNAL MEDICINE CLINIC | Facility: CLINIC | Age: 89
End: 2024-06-07

## 2024-06-07 NOTE — TELEPHONE ENCOUNTER
----- Message from Bunny Galaviz MD sent at 6/7/2024  7:04 AM EDT -----   LS spine MRI shows severe arthritis of the lower back, if he is not improved, refer to [facility] to treat low back pain cms

## 2024-06-07 NOTE — TELEPHONE ENCOUNTER
This has been fully explained to the patient, who indicates understanding.  Per pt he will think about it and he will let the doctor know if he needs the referral for physical therapy.

## 2024-11-13 NOTE — PERIOP NOTES
6/9/2021  7:16 PM - Brian, Lab In [facility]    Specimen Information: Nasal swab        Component Value Flag Ref Range Units Status   Special Requests:      Final   NO SPECIAL REQUESTS    Culture result: Abnormal       Final   MRSA target DNA not detected, SA target DNA detected.   A MRSA negative, SA positive test result does not preclude MRSA nasal colonization.      Reviewed 13-Nov-2024 12:49

## 2025-01-14 ENCOUNTER — TELEPHONE (OUTPATIENT)
Dept: INTERNAL MEDICINE CLINIC | Facility: CLINIC | Age: 89
End: 2025-01-14

## 2025-01-14 ENCOUNTER — TELEMEDICINE (OUTPATIENT)
Dept: INTERNAL MEDICINE CLINIC | Facility: CLINIC | Age: 89
End: 2025-01-14

## 2025-01-14 DIAGNOSIS — M25.552 ACUTE HIP PAIN, LEFT: Primary | ICD-10-CM

## 2025-01-14 DIAGNOSIS — M70.62 TROCHANTERIC BURSITIS OF LEFT HIP: ICD-10-CM

## 2025-01-14 DIAGNOSIS — M25.552 ACUTE HIP PAIN, LEFT: ICD-10-CM

## 2025-01-14 RX ORDER — PREDNISONE 20 MG/1
20 TABLET ORAL 2 TIMES DAILY
Qty: 10 TABLET | Refills: 0 | Status: SHIPPED | OUTPATIENT
Start: 2025-01-14 | End: 2025-01-19

## 2025-01-14 RX ORDER — OXYCODONE HYDROCHLORIDE 5 MG/1
5 TABLET ORAL EVERY 4 HOURS PRN
COMMUNITY
End: 2025-01-14 | Stop reason: SDUPTHER

## 2025-01-14 RX ORDER — OXYCODONE HYDROCHLORIDE 5 MG/1
5 TABLET ORAL EVERY 8 HOURS PRN
Qty: 30 TABLET | Refills: 0 | Status: SHIPPED | OUTPATIENT
Start: 2025-01-14 | End: 2025-01-24

## 2025-01-14 ASSESSMENT — ENCOUNTER SYMPTOMS
SHORTNESS OF BREATH: 0
ABDOMINAL PAIN: 0
COUGH: 0

## 2025-01-14 NOTE — TELEPHONE ENCOUNTER
Patient had a VV with Jeanette today for hip pain, and is requesting a refill on Oxycodone.    Rx pended below to be sent to pharmacy for Oxycodone 5 mg Q8H PRN #30 for 10 day supply if you agree.

## 2025-01-14 NOTE — PROGRESS NOTES
1/14/2025 9:48 AM  Location:[facility] PHYSICIAN SERVICES  [facility] [place] MEDICINE  [place]  Patient #:  [id number]  YOB: 1935        History of Present Illness     Chief Complaint   Patient presents with    Hip Pain     Patient c/o severe L hip pain x 5-6 days.  Patient states he has been taking Oxycodone 5 mg, which does seem to help with the pain, and is requesting refill.        Mr. Palmer is a 89 y.o. male  who presents for phone visit. He has been having hip pain on L side. Hurts on outside of hip/upper thigh. States he has some numbness in leg that is intermittent. No fall but was putting up boxes and thinks he twisted something/ he was lifting heavy boxes and later that day felt the pain. No swelling or bruising. He is having to use a walker.  When pt sits, pain is constant/unbearable, cannot sit still due to pain. He is taking oxycodone (old prescription for back pain from pcp)  and it helps some.     Allergy to meloxicam.    Pmhx cardiomyopathy, cad, htn, chronic low back pain, prediabetes, L foot drop, htn, lumbar stenosis.            Allergies   Allergen Reactions    Sulfamethoxazole-Trimethoprim Rash    Meloxicam Itching     Past Medical History:   Diagnosis Date    Borderline diabetes 10/08/2015    diet controlled    CAD (coronary artery disease) 06/2018    NSTEMI, 2 stents    COVID-19 vaccine series completed 03/04/2021    moderna vaccine completed.  card scanned under media    Environmental allergies     H/O thromboembolism 4/15/2015    HCAP (healthcare-associated pneumonia) 6/13/2018    Hypertension     managed w/med    Ischemic cardiomyopathy 6/1/2018    1. LV gram:  EF 40-45% with apical hypokinesis.  2.  Echo (6/2/18):  EF 50-55%. No signnificant valve disease.     Macular degeneration     injections in eye, 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

## 2025-01-14 NOTE — TELEPHONE ENCOUNTER
No fracture of hip. Schedule him later this week if possible to be seen and eval'd. May consider injection for bursitis . If he gets better then let me know.

## 2025-01-15 NOTE — TELEPHONE ENCOUNTER
Patient informed and verbalized understanding.  Appointment scheduled for 1/17, will call back and cancel if he is doing better.

## 2025-01-17 ENCOUNTER — OFFICE VISIT (OUTPATIENT)
Dept: INTERNAL MEDICINE CLINIC | Facility: CLINIC | Age: 89
End: 2025-01-17

## 2025-01-17 ENCOUNTER — TELEPHONE (OUTPATIENT)
Dept: INTERNAL MEDICINE CLINIC | Facility: CLINIC | Age: 89
End: 2025-01-17

## 2025-01-17 VITALS
HEIGHT: 72 IN | OXYGEN SATURATION: 100 % | SYSTOLIC BLOOD PRESSURE: 114 MMHG | DIASTOLIC BLOOD PRESSURE: 57 MMHG | HEART RATE: 79 BPM | BODY MASS INDEX: 28.28 KG/M2 | TEMPERATURE: 97.6 F | RESPIRATION RATE: 16 BRPM | WEIGHT: 208.8 LBS

## 2025-01-17 DIAGNOSIS — M70.62 TROCHANTERIC BURSITIS OF LEFT HIP: Primary | ICD-10-CM

## 2025-01-17 DIAGNOSIS — M54.32 SCIATICA OF LEFT SIDE: ICD-10-CM

## 2025-01-17 RX ORDER — IBUPROFEN 800 MG/1
800 TABLET, FILM COATED ORAL 2 TIMES DAILY PRN
Qty: 28 TABLET | Refills: 0 | Status: SHIPPED | OUTPATIENT
Start: 2025-01-17 | End: 2025-01-31

## 2025-01-17 RX ORDER — TRIAMCINOLONE ACETONIDE 40 MG/ML
40 INJECTION, SUSPENSION INTRA-ARTICULAR; INTRAMUSCULAR ONCE
Status: COMPLETED | OUTPATIENT
Start: 2025-01-17 | End: 2025-01-17

## 2025-01-17 RX ADMIN — TRIAMCINOLONE ACETONIDE 40 MG: 40 INJECTION, SUSPENSION INTRA-ARTICULAR; INTRAMUSCULAR at 12:12

## 2025-01-17 SDOH — ECONOMIC STABILITY: FOOD INSECURITY: WITHIN THE PAST 12 MONTHS, THE FOOD YOU BOUGHT JUST DIDN'T LAST AND YOU DIDN'T HAVE MONEY TO GET MORE.: NEVER TRUE

## 2025-01-17 SDOH — ECONOMIC STABILITY: FOOD INSECURITY: WITHIN THE PAST 12 MONTHS, YOU WORRIED THAT YOUR FOOD WOULD RUN OUT BEFORE YOU GOT MONEY TO BUY MORE.: NEVER TRUE

## 2025-01-17 ASSESSMENT — ENCOUNTER SYMPTOMS
SHORTNESS OF BREATH: 0
ABDOMINAL PAIN: 0
COUGH: 0

## 2025-01-17 ASSESSMENT — PATIENT HEALTH QUESTIONNAIRE - PHQ9
1. LITTLE INTEREST OR PLEASURE IN DOING THINGS: NOT AT ALL
2. FEELING DOWN, DEPRESSED OR HOPELESS: NOT AT ALL
SUM OF ALL RESPONSES TO PHQ QUESTIONS 1-9: 0
SUM OF ALL RESPONSES TO PHQ9 QUESTIONS 1 & 2: 0
SUM OF ALL RESPONSES TO PHQ QUESTIONS 1-9: 0

## 2025-01-17 NOTE — TELEPHONE ENCOUNTER
Pt request refill on oxycodone. Can we forward to dr tomlinson?   Principal Discharge DX:	Sepsis  Secondary Diagnosis:	Brain metastases  Secondary Diagnosis:	Diabetes  Secondary Diagnosis:	Hypertension Principal Discharge DX:	Sepsis  Goal:	Resolution with outpatient followup  Assessment and plan of treatment:	You were admitted to the hospital with sepsis of unknown source. Given the timeline of events, you likely had an infection around your chemo-port. Interventional radiology removed your chemo-port and you were treated with antibiotics with clinical improvement. Please take your course of antibiotics as prescribed and followup with your primary care provider for continued management. If you experience worsening weakness, fevers, chills, or other signs of infection, please seek medical attention.  Secondary Diagnosis:	Brain metastases  Assessment and plan of treatment:	You have a history of metastatic cancer to your brain. CT of the head showed no acute intracranial abnormality. No changes were made to your dexamethasone for symptomatic treatment. Please followup with Dr. Noonan for continued management.  Secondary Diagnosis:	Diabetes  Goal:	Outpatient followup  Assessment and plan of treatment:	Please consume a consistent carbohydrate diet and take your insulin as directed. Please followup with your primary care provider for continued management.  Secondary Diagnosis:	Hypertension  Assessment and plan of treatment:	Please followup with your primary care provider for continued management of your hypertension. Principal Discharge DX:	Sepsis  Goal:	Resolution with outpatient followup  Assessment and plan of treatment:	You were admitted to the hospital with sepsis of unknown source. Given the timeline of events, you likely had an infection around your chemo-port. Interventional radiology removed your chemo-port and you were treated with antibiotics with clinical improvement. Please take your course of antibiotics as prescribed and followup with your primary care provider for continued management. If you experience worsening weakness, fevers, chills, or other signs of infection, please seek medical attention.  Secondary Diagnosis:	Brain metastases  Assessment and plan of treatment:	You have a history of metastatic cancer to your brain. CT of the head showed no acute intracranial abnormality. No changes were made to your dexamethasone for symptomatic treatment. Please followup with Dr. Noonan for continued management.  Secondary Diagnosis:	Diabetes  Goal:	Outpatient followup  Assessment and plan of treatment:	Please consume a consistent carbohydrate diet and take your insulin as directed. Please followup with your primary care provider for continued management.  Secondary Diagnosis:	Hypertension  Assessment and plan of treatment:	Please followup with your primary care provider for continued management of your hypertension.  Secondary Diagnosis:	Lung cancer  Assessment and plan of treatment:	You have a history of stage IV lung cancer and receive care with Dr. Noonan. CT PE protocol of the chest on admission demonstrated no central pulmonary embolus, extensive right hilar lymphadenopathy, right cervical and paratracheal lymphadenopathy. Right hilar and right lower lobe necrotic masses again seen. Principal Discharge DX:	Sepsis  Goal:	Resolution with outpatient followup  Assessment and plan of treatment:	You were admitted to the hospital with sepsis of unknown source. Given the timeline of events, you likely had an infection around your chemo-port. Interventional radiology removed your chemo-port and you were treated with antibiotics with clinical improvement. Please defer to your in-patient Hospice for further management  Secondary Diagnosis:	Brain metastases  Assessment and plan of treatment:	You have a history of metastatic cancer to your brain. CT of the head showed no acute intracranial abnormality. No changes were made to your dexamethasone for symptomatic treatment. Please defer to your in-patient Hospice for further management  Secondary Diagnosis:	Diabetes  Goal:	Outpatient followup  Assessment and plan of treatment:	Please defer to your in-patient Hospice for further management  Secondary Diagnosis:	Hypertension  Assessment and plan of treatment:	Please defer to your in-patient Hospice for further management  Secondary Diagnosis:	Lung cancer  Assessment and plan of treatment:	You have a history of stage IV lung cancer and receive care with Dr. Noonan. CT PE protocol of the chest on admission demonstrated no central pulmonary embolus, extensive right hilar lymphadenopathy, right cervical and paratracheal lymphadenopathy. Right hilar and right lower lobe necrotic masses again seen.  Please defer to your in-patient Hospice for further management

## 2025-01-17 NOTE — TELEPHONE ENCOUNTER
Let pt know that he had 30 tabs sent in on 1/14, maybe they need to pick this up. Cannot send any more than that.

## 2025-01-17 NOTE — TELEPHONE ENCOUNTER
Patient informed, he was confused on how many tablets he had, but he did get 30, and does not need anymore currently.

## 2025-01-17 NOTE — PROGRESS NOTES
Number of Times Moved in the Last Year: 0     Homeless in the Last Year: No            Review of Systems    Review of Systems   Constitutional:  Negative for chills, fatigue, fever and unexpected weight change.   Respiratory:  Negative for cough and shortness of breath.    Cardiovascular:  Negative for chest pain.   Gastrointestinal:  Negative for abdominal pain.   Musculoskeletal:  Positive for arthralgias and gait problem. Negative for joint swelling. Back pain: L hip/outer upper thigh region.  Neurological:  Positive for weakness (L leg and hip) and numbness.   All other systems reviewed and are negative.      BP (!) 114/57   Pulse 79   Temp 97.6 °F (36.4 °C) (Temporal)   Resp 16   Ht 1.829 m (6')   Wt 94.7 kg (208 lb 12.8 oz)   SpO2 100%   BMI 28.32 kg/m²       Physical Exam    Physical Exam  Constitutional:       General: He is not in acute distress.     Appearance: Normal appearance. He is not ill-appearing.   Cardiovascular:      Rate and Rhythm: Normal rate and regular rhythm.      Heart sounds: Normal heart sounds.   Pulmonary:      Effort: Pulmonary effort is normal.      Breath sounds: Normal breath sounds.   Musculoskeletal:        Legs:       Comments: Ttp in region marked above. Noted antalgic gait, using walker, cannot put full weight on L leg.    Neurological:      Mental Status: He is alert and oriented to person, place, and time.           Assessment & Plan    Luke was seen today for hip pain.    Diagnoses and all orders for this visit:    Trochanteric bursitis of left hip  -     triamcinolone acetonide (KENALOG-40) injection 40 mg  -     ibuprofen (ADVIL;MOTRIN) 800 MG tablet; Take 1 tablet by mouth 2 times daily as needed for Pain    Sciatica of left side          L greater trochanter bursa maximum point of tenderness was located. Site was prepped with betadine and ethyl chloride spray used for anesthetic.  An intrabursal injection was performed at the site of maximal tenderness

## 2025-02-11 ENCOUNTER — OFFICE VISIT (OUTPATIENT)
Dept: INTERNAL MEDICINE CLINIC | Facility: CLINIC | Age: 89
End: 2025-02-11
Payer: MEDICARE

## 2025-02-11 VITALS
WEIGHT: 202 LBS | RESPIRATION RATE: 16 BRPM | SYSTOLIC BLOOD PRESSURE: 138 MMHG | HEIGHT: 72 IN | DIASTOLIC BLOOD PRESSURE: 68 MMHG | OXYGEN SATURATION: 99 % | HEART RATE: 72 BPM | TEMPERATURE: 97.9 F | BODY MASS INDEX: 27.36 KG/M2

## 2025-02-11 DIAGNOSIS — R29.898 WEAKNESS OF LEFT LEG: ICD-10-CM

## 2025-02-11 DIAGNOSIS — M70.62 TROCHANTERIC BURSITIS OF LEFT HIP: ICD-10-CM

## 2025-02-11 DIAGNOSIS — G89.29 CHRONIC HIP PAIN, LEFT: Primary | ICD-10-CM

## 2025-02-11 DIAGNOSIS — M25.552 CHRONIC HIP PAIN, LEFT: Primary | ICD-10-CM

## 2025-02-11 PROCEDURE — 1159F MED LIST DOCD IN RCRD: CPT | Performed by: NURSE PRACTITIONER

## 2025-02-11 PROCEDURE — 1036F TOBACCO NON-USER: CPT | Performed by: NURSE PRACTITIONER

## 2025-02-11 PROCEDURE — 1160F RVW MEDS BY RX/DR IN RCRD: CPT | Performed by: NURSE PRACTITIONER

## 2025-02-11 PROCEDURE — G8427 DOCREV CUR MEDS BY ELIG CLIN: HCPCS | Performed by: NURSE PRACTITIONER

## 2025-02-11 PROCEDURE — 1123F ACP DISCUSS/DSCN MKR DOCD: CPT | Performed by: NURSE PRACTITIONER

## 2025-02-11 PROCEDURE — 99214 OFFICE O/P EST MOD 30 MIN: CPT | Performed by: NURSE PRACTITIONER

## 2025-02-11 PROCEDURE — G8417 CALC BMI ABV UP PARAM F/U: HCPCS | Performed by: NURSE PRACTITIONER

## 2025-02-11 RX ORDER — IBUPROFEN 800 MG/1
800 TABLET, FILM COATED ORAL 2 TIMES DAILY PRN
Qty: 28 TABLET | Refills: 0 | Status: SHIPPED | OUTPATIENT
Start: 2025-02-11 | End: 2025-02-25

## 2025-02-11 RX ORDER — METHYLPREDNISOLONE 4 MG/1
TABLET ORAL
Qty: 1 KIT | Refills: 0 | Status: SHIPPED | OUTPATIENT
Start: 2025-02-11 | End: 2025-02-17

## 2025-02-11 ASSESSMENT — ENCOUNTER SYMPTOMS
SHORTNESS OF BREATH: 0
COUGH: 0
ABDOMINAL PAIN: 0

## 2025-02-11 NOTE — PROGRESS NOTES
2/11/2025 11:28 AM  Location:[facility] PHYSICIAN SERVICES  [facility] [place] MEDICINE  [place]  Patient #:  [id number]  YOB: 1935      History of Present Illness     Chief Complaint   Patient presents with    Hip Pain     Patient presents in the office today for a follow up on L hip pain.  Patient states the pain did get better with steroid injection for about 2-3 weeks, but the pain has started again.       Mr. Palmer is a 89 y.o. male  who presents for worsening hip pain on L/gluteal. Tx for bursitis last ov with prednisone and had kenalog injection.  Takes 1-2 ibuprofen daily. Has not tried tylenol. Pain is intermittent now, but still weak in L leg. If he sits too long it hurts, having to use walker due to the pain and weakness of L hip/leg/ normally does not. They are not wanting PT. No numbness. Pt states raising his leg hurts in hip region/glute/groin.  Has done some of the exercises provided. Some such as leg raise , he could not do due to pain.  Xray of hip neg for fracture but shows degenerative joint disease of spine and hips.     Pmhx cardiomyopathy, cad, htn, chronic low back pain, prediabetes, L foot drop, htn, lumbar stenosis       Allergies   Allergen Reactions    Sulfamethoxazole-Trimethoprim Rash    Meloxicam Itching        Current Outpatient Medications   Medication Sig Dispense Refill    methylPREDNISolone (MEDROL DOSEPACK) 4 MG tablet Take by mouth. 1 kit 0    ibuprofen (ADVIL;MOTRIN) 800 MG tablet Take 1 tablet by mouth 2 times daily as needed for Pain 28 tablet 0    carvedilol (COREG) 6.25 MG tablet Take 1 tablet by mouth 2 times daily (with meals) with meals 180 tablet 3    lisinopril-hydroCHLOROthiazide (PRINZIDE;ZESTORETIC) 20-25 MG per tablet Take 1 tablet by mouth daily 90 tablet 3    rosuvastatin (CRESTOR) 40 MG tablet TAKE ONE TABLET BY MOUTH EVERY DAY NIGHTLY 90 tablet 3    Multiple Vitamin (MULTIVITAMIN ADULT PO) Take 1 tablet by mouth daily      Multiple

## 2025-02-21 ENCOUNTER — TELEPHONE (OUTPATIENT)
Dept: INTERNAL MEDICINE CLINIC | Facility: CLINIC | Age: 89
End: 2025-02-21

## 2025-02-21 NOTE — TELEPHONE ENCOUNTER
Patient informed and verbalized understanding.  Patient states the pain has seemed to resolve over the last 2 days.  He will think about PT and will call back if he would like to proceed with referral.

## 2025-02-21 NOTE — TELEPHONE ENCOUNTER
Hip /pelvic CT was without significant abnormality.  I do suggest either or both : PT and ortho referral. Especially if he is not improved. Johnnie.

## 2025-03-14 ENCOUNTER — OFFICE VISIT (OUTPATIENT)
Dept: INTERNAL MEDICINE CLINIC | Facility: CLINIC | Age: 89
End: 2025-03-14

## 2025-03-14 VITALS
BODY MASS INDEX: 27.79 KG/M2 | OXYGEN SATURATION: 100 % | RESPIRATION RATE: 16 BRPM | HEIGHT: 72 IN | TEMPERATURE: 97.4 F | SYSTOLIC BLOOD PRESSURE: 126 MMHG | HEART RATE: 72 BPM | WEIGHT: 205.2 LBS | DIASTOLIC BLOOD PRESSURE: 58 MMHG

## 2025-03-14 DIAGNOSIS — R68.89 OTHER GENERAL SYMPTOMS AND SIGNS: ICD-10-CM

## 2025-03-14 DIAGNOSIS — I10 PRIMARY HYPERTENSION: ICD-10-CM

## 2025-03-14 DIAGNOSIS — Z00.00 MEDICARE ANNUAL WELLNESS VISIT, SUBSEQUENT: ICD-10-CM

## 2025-03-14 DIAGNOSIS — M54.50 CHRONIC BILATERAL LOW BACK PAIN WITHOUT SCIATICA: Primary | ICD-10-CM

## 2025-03-14 DIAGNOSIS — G89.29 CHRONIC BILATERAL LOW BACK PAIN WITHOUT SCIATICA: Primary | ICD-10-CM

## 2025-03-14 DIAGNOSIS — M70.62 TROCHANTERIC BURSITIS OF LEFT HIP: ICD-10-CM

## 2025-03-14 DIAGNOSIS — R53.82 CHRONIC FATIGUE: ICD-10-CM

## 2025-03-14 DIAGNOSIS — R73.03 PREDIABETES: ICD-10-CM

## 2025-03-14 LAB
ALBUMIN SERPL-MCNC: 3.5 G/DL (ref 3.2–4.6)
ALBUMIN/GLOB SERPL: 1.2 (ref 1–1.9)
ALP SERPL-CCNC: 86 U/L (ref 40–129)
ALT SERPL-CCNC: 16 U/L (ref 8–55)
ANION GAP SERPL CALC-SCNC: 13 MMOL/L (ref 7–16)
AST SERPL-CCNC: 26 U/L (ref 15–37)
BASOPHILS # BLD: 0.01 K/UL (ref 0–0.2)
BASOPHILS NFR BLD: 0.2 % (ref 0–2)
BILIRUB SERPL-MCNC: 0.3 MG/DL (ref 0–1.2)
BUN SERPL-MCNC: 27 MG/DL (ref 8–23)
CALCIUM SERPL-MCNC: 9.4 MG/DL (ref 8.8–10.2)
CHLORIDE SERPL-SCNC: 96 MMOL/L (ref 98–107)
CHOLEST SERPL-MCNC: 95 MG/DL (ref 0–200)
CO2 SERPL-SCNC: 24 MMOL/L (ref 20–29)
CREAT SERPL-MCNC: 1.25 MG/DL (ref 0.8–1.3)
DIFFERENTIAL METHOD BLD: ABNORMAL
EOSINOPHIL # BLD: 0.51 K/UL (ref 0–0.8)
EOSINOPHIL NFR BLD: 7.8 % (ref 0.5–7.8)
ERYTHROCYTE [DISTWIDTH] IN BLOOD BY AUTOMATED COUNT: 13.9 % (ref 11.9–14.6)
EST. AVERAGE GLUCOSE BLD GHB EST-MCNC: 126 MG/DL
GLOBULIN SER CALC-MCNC: 2.9 G/DL (ref 2.3–3.5)
GLUCOSE SERPL-MCNC: 97 MG/DL (ref 70–99)
HBA1C MFR BLD: 6 % (ref 0–5.6)
HCT VFR BLD AUTO: 33.3 % (ref 41.1–50.3)
HDLC SERPL-MCNC: 27 MG/DL (ref 40–60)
HDLC SERPL: 3.5 (ref 0–5)
HGB BLD-MCNC: 11.2 G/DL (ref 13.6–17.2)
IMM GRANULOCYTES # BLD AUTO: 0.02 K/UL (ref 0–0.5)
IMM GRANULOCYTES NFR BLD AUTO: 0.3 % (ref 0–5)
LDLC SERPL CALC-MCNC: 40 MG/DL (ref 0–100)
LYMPHOCYTES # BLD: 1.06 K/UL (ref 0.5–4.6)
LYMPHOCYTES NFR BLD: 16.3 % (ref 13–44)
MCH RBC QN AUTO: 29.6 PG (ref 26.1–32.9)
MCHC RBC AUTO-ENTMCNC: 33.6 G/DL (ref 31.4–35)
MCV RBC AUTO: 88.1 FL (ref 82–102)
MONOCYTES # BLD: 0.7 K/UL (ref 0.1–1.3)
MONOCYTES NFR BLD: 10.8 % (ref 4–12)
NEUTS SEG # BLD: 4.21 K/UL (ref 1.7–8.2)
NEUTS SEG NFR BLD: 64.6 % (ref 43–78)
NRBC # BLD: 0 K/UL (ref 0–0.2)
PLATELET # BLD AUTO: 250 K/UL (ref 150–450)
PMV BLD AUTO: 9.2 FL (ref 9.4–12.3)
POTASSIUM SERPL-SCNC: 4 MMOL/L (ref 3.5–5.1)
PROT SERPL-MCNC: 6.4 G/DL (ref 6.3–8.2)
RBC # BLD AUTO: 3.78 M/UL (ref 4.23–5.6)
SODIUM SERPL-SCNC: 132 MMOL/L (ref 136–145)
TRIGL SERPL-MCNC: 139 MG/DL (ref 0–150)
TSH, 3RD GENERATION: 2.26 UIU/ML (ref 0.27–4.2)
VIT B12 SERPL-MCNC: 717 PG/ML (ref 193–986)
VLDLC SERPL CALC-MCNC: 28 MG/DL (ref 6–23)
WBC # BLD AUTO: 6.5 K/UL (ref 4.3–11.1)

## 2025-03-14 RX ORDER — OXYCODONE HYDROCHLORIDE 5 MG/1
5 TABLET ORAL EVERY 8 HOURS PRN
Qty: 30 TABLET | Refills: 0 | COMMUNITY
Start: 2025-03-14 | End: 2026-03-14

## 2025-03-14 RX ORDER — OXYCODONE HYDROCHLORIDE 5 MG/1
5 TABLET ORAL EVERY 4 HOURS PRN
Qty: 18 TABLET | Refills: 0 | COMMUNITY
Start: 2025-03-14 | End: 2026-03-14

## 2025-03-14 RX ORDER — IBUPROFEN 800 MG/1
800 TABLET, FILM COATED ORAL 2 TIMES DAILY PRN
Qty: 30 TABLET | Refills: 4 | Status: SHIPPED | OUTPATIENT
Start: 2025-03-14 | End: 2025-05-28

## 2025-03-14 RX ORDER — OXYCODONE HYDROCHLORIDE 5 MG/1
5 TABLET ORAL EVERY 8 HOURS PRN
Qty: 30 TABLET | Refills: 0 | Status: SHIPPED | OUTPATIENT
Start: 2025-03-14 | End: 2025-04-13

## 2025-03-14 RX ORDER — TRIAMCINOLONE ACETONIDE 40 MG/ML
40 INJECTION, SUSPENSION INTRA-ARTICULAR; INTRAMUSCULAR ONCE
Status: COMPLETED | OUTPATIENT
Start: 2025-03-14 | End: 2025-03-14

## 2025-03-14 RX ORDER — OXYCODONE HYDROCHLORIDE 5 MG/1
5 TABLET ORAL EVERY 8 HOURS PRN
COMMUNITY
End: 2025-03-14 | Stop reason: SDUPTHER

## 2025-03-14 RX ADMIN — TRIAMCINOLONE ACETONIDE 40 MG: 40 INJECTION, SUSPENSION INTRA-ARTICULAR; INTRAMUSCULAR at 12:01

## 2025-03-14 ASSESSMENT — PATIENT HEALTH QUESTIONNAIRE - PHQ9
SUM OF ALL RESPONSES TO PHQ QUESTIONS 1-9: 2
1. LITTLE INTEREST OR PLEASURE IN DOING THINGS: SEVERAL DAYS
SUM OF ALL RESPONSES TO PHQ QUESTIONS 1-9: 2
2. FEELING DOWN, DEPRESSED OR HOPELESS: SEVERAL DAYS

## 2025-03-14 ASSESSMENT — LIFESTYLE VARIABLES
HOW OFTEN DO YOU HAVE A DRINK CONTAINING ALCOHOL: NEVER
HOW MANY STANDARD DRINKS CONTAINING ALCOHOL DO YOU HAVE ON A TYPICAL DAY: PATIENT DOES NOT DRINK

## 2025-03-14 NOTE — PROGRESS NOTES
3/14/2025 1:47 PM  Location:[facility] PHYSICIAN SERVICES  [facility] INTERNAL MEDICINE  [place]  Patient #:  [id number]  YOB: 1935          YOUR LAST HEMOGLOBIN A1CS:   No results found for: \"HBA1C\", \"[id number]\"    YOUR LAST LIPID PROFILE:   Lab Results   Component Value Date/Time    CHOL 99 03/22/2024 11:07 AM    HDL 33 03/22/2024 11:07 AM    LDL 30.2 03/22/2024 11:07 AM    VLDL 35.8 03/22/2024 11:07 AM    VLDL 23 07/29/2021 12:07 PM         Lab Results   Component Value Date/Time    GFRAA >60 08/10/2022 11:18 AM    BUN 28 03/22/2024 11:07 AM     03/22/2024 11:07 AM    K 3.8 03/22/2024 11:07 AM    CL 99 03/22/2024 11:07 AM    CO2 29 03/22/2024 11:07 AM           History of Present Illness     Chief Complaint   Patient presents with    Medicare AWV     Subsequent    6 Month Check-up     Pt presents to the office today for a 6 month check-up.      Rash     Have a rash all over; saw the dermatologist and was given a cream to try    Fatigue     Very tired    Gait Problem     Unsteady; unable to get around      Patient c/o left hip and low back pain continues to be a problem although it is improved.  He is doing home exercises.  CT of the abdomen pelvis revealed a fracture but did show SI joint and LS-spine arthritic changes.  Continue using walker at all times.  No falls have occurred.  He was seen by dermatology and yesterday for a diffuse rash over his torso.  He was given some type of steroid cream to use.  He is not aware of any new medications or topical products that he has been using that would cause this.  Mr. Palmer is a 89 y.o. male  who presents for ov      Allergies   Allergen Reactions    Sulfamethoxazole-Trimethoprim Rash    Meloxicam Itching     Past Medical History:   Diagnosis Date    Borderline diabetes 10/08/2015    diet controlled    CAD (coronary artery disease) 06/2018    NSTEMI, 2 stents    COVID-19 vaccine series completed 03/04/2021    moderna vaccine completed.  card

## 2025-03-15 ENCOUNTER — RESULTS FOLLOW-UP (OUTPATIENT)
Dept: INTERNAL MEDICINE CLINIC | Facility: CLINIC | Age: 89
End: 2025-03-15

## 2025-03-25 NOTE — PROGRESS NOTES
tablet by mouth every 4 hours as needed for Pain for up to 3 days. Max Daily Amount: 30 mg, Disp: 18 tablet, Rfl: 0     Allergies   Allergen Reactions    Sulfamethoxazole-Trimethoprim Rash    Meloxicam Itching        Patient Active Problem List    Diagnosis Date Noted    Chronic bilateral low back pain without sciatica 2021     Priority: Low    Left foot drop 2021     Priority: Low    Hypertension      Priority: Low     controlled w/med        Mediastinal adenopathy 2018     Priority: Low    Coronary artery disease involving native coronary artery of native heart 2018     Priority: Low     1.  NSTEMI (18): Peak troponin 11. EF 40-45%.  Multi-vessel PCI.    mLAD:  Synergy 3.5 x16 mm MASSIEL.  mCirc:  Synergy 3 x 20 mm MASSIEL.  mRCA:    [place] 5 x 22 mm MASSIEL.          Ischemic cardiomyopathy 2018     Priority: Low     1. LV gram:  EF 40-45% with apical hypokinesis.   2.  Echo (18):  EF 50-55%. No signnificant valve disease.   3.  Echo (19): EF 59%.  Normal diastolic function. Mild LAE.  Normal valves.   4.  Echo (3/20/24):  EF 50-55%.  Mild hypertrophy.  Normal diastolic function. No significant valve disease         Dyslipidemia 2018     Priority: Low    Macular degeneration 2018     Priority: Low    Allergic rhinitis 10/20/2016     Priority: Low    Encounter for long-term (current) use of medications 2016     Priority: Low    Prediabetes 10/08/2015     Priority: Low    Arthritis      Priority: Low     osteo            Social History     Tobacco Use    Smoking status: Former     Current packs/day: 0.00     Types: Cigarettes     Quit date: 10/1/1960     Years since quittin.5    Smokeless tobacco: Never   Substance Use Topics    Alcohol use: No       ROS:    Review of Systems   Constitutional: Negative for malaise/fatigue.   Cardiovascular:  Negative for chest pain.   Respiratory:  Negative for shortness of breath.    Musculoskeletal:  Positive for

## 2025-03-26 ENCOUNTER — OFFICE VISIT (OUTPATIENT)
Age: 89
End: 2025-03-26
Payer: MEDICARE

## 2025-03-26 VITALS
SYSTOLIC BLOOD PRESSURE: 138 MMHG | WEIGHT: 199 LBS | BODY MASS INDEX: 26.95 KG/M2 | DIASTOLIC BLOOD PRESSURE: 72 MMHG | HEART RATE: 76 BPM | HEIGHT: 72 IN

## 2025-03-26 DIAGNOSIS — I25.5 ISCHEMIC CARDIOMYOPATHY: ICD-10-CM

## 2025-03-26 DIAGNOSIS — I10 PRIMARY HYPERTENSION: ICD-10-CM

## 2025-03-26 DIAGNOSIS — E78.5 DYSLIPIDEMIA: ICD-10-CM

## 2025-03-26 DIAGNOSIS — I25.10 CORONARY ARTERY DISEASE INVOLVING NATIVE CORONARY ARTERY OF NATIVE HEART WITHOUT ANGINA PECTORIS: Primary | ICD-10-CM

## 2025-03-26 PROCEDURE — G8417 CALC BMI ABV UP PARAM F/U: HCPCS | Performed by: INTERNAL MEDICINE

## 2025-03-26 PROCEDURE — 1123F ACP DISCUSS/DSCN MKR DOCD: CPT | Performed by: INTERNAL MEDICINE

## 2025-03-26 PROCEDURE — 1126F AMNT PAIN NOTED NONE PRSNT: CPT | Performed by: INTERNAL MEDICINE

## 2025-03-26 PROCEDURE — 1159F MED LIST DOCD IN RCRD: CPT | Performed by: INTERNAL MEDICINE

## 2025-03-26 PROCEDURE — 93000 ELECTROCARDIOGRAM COMPLETE: CPT | Performed by: INTERNAL MEDICINE

## 2025-03-26 PROCEDURE — G8427 DOCREV CUR MEDS BY ELIG CLIN: HCPCS | Performed by: INTERNAL MEDICINE

## 2025-03-26 PROCEDURE — 99214 OFFICE O/P EST MOD 30 MIN: CPT | Performed by: INTERNAL MEDICINE

## 2025-03-26 PROCEDURE — 1036F TOBACCO NON-USER: CPT | Performed by: INTERNAL MEDICINE

## 2025-03-26 RX ORDER — ROSUVASTATIN CALCIUM 40 MG/1
TABLET, COATED ORAL
Qty: 90 TABLET | Refills: 3 | Status: SHIPPED | OUTPATIENT
Start: 2025-03-26

## 2025-03-26 RX ORDER — LISINOPRIL AND HYDROCHLOROTHIAZIDE 20; 25 MG/1; MG/1
1 TABLET ORAL DAILY
Qty: 90 TABLET | Refills: 3 | Status: SHIPPED | OUTPATIENT
Start: 2025-03-26

## 2025-03-26 RX ORDER — CARVEDILOL 6.25 MG/1
6.25 TABLET ORAL 2 TIMES DAILY WITH MEALS
Qty: 180 TABLET | Refills: 3 | Status: SHIPPED | OUTPATIENT
Start: 2025-03-26

## 2025-03-26 ASSESSMENT — ENCOUNTER SYMPTOMS: SHORTNESS OF BREATH: 0

## (undated) DEVICE — HAND PACK: Brand: MEDLINE INDUSTRIES, INC.

## (undated) DEVICE — SUT ETHLN 3-0 18IN PS1 BLK --

## (undated) DEVICE — SUTURE VCRL + SZ 3-0 L18IN ABSRB UD SH 1/2 CIR TAPERCUT NDL VCP864D

## (undated) DEVICE — SYRINGE IRRIG 60ML SFT PLIABLE BLB EZ TO GRP 1 HND USE W/

## (undated) DEVICE — DISPOSABLE BIPOLAR CODE, 12' (3.66 M): Brand: CONMED

## (undated) DEVICE — REM POLYHESIVE ADULT PATIENT RETURN ELECTRODE: Brand: VALLEYLAB

## (undated) DEVICE — DRESSING,GAUZE,XEROFORM,CURAD,1"X8",ST: Brand: CURAD

## (undated) DEVICE — AGENT HEMSTAT W3XL4IN OXIDIZED REGENERATED CELOS ABSRB FOR

## (undated) DEVICE — SYR 10ML LUER LOK 1/5ML GRAD --

## (undated) DEVICE — 3M™ TEGADERM™ TRANSPARENT FILM DRESSING FRAME STYLE, 1628, 6 IN X 8 IN (15 CM X 20 CM), 10/CT 8CT/CASE: Brand: 3M™ TEGADERM™

## (undated) DEVICE — PAD,NON-ADHERENT,3X8,STERILE,LF,1/PK: Brand: MEDLINE

## (undated) DEVICE — DISPOSABLE STANDARD BIPOLAR FORCEPS, NON-STICK,: Brand: SPETZLER-MALIS

## (undated) DEVICE — SURGIFOAM SPNG SZ 100

## (undated) DEVICE — SUTURE ETHLN SZ 4-0 L18IN NONABSORBABLE BLK L19MM PS-2 3/8 1667H

## (undated) DEVICE — BANDAGE,ELASTIC,ESMARK,STERILE,4"X9',LF: Brand: MEDLINE

## (undated) DEVICE — BIPOLAR SEALER 23-113-1 AQM 2.3 OM NEURO: Brand: AQUAMANTYS ®

## (undated) DEVICE — GOWN,SIRUS,POLYRNF,SETINSLV,L,20/CS: Brand: MEDLINE

## (undated) DEVICE — DRAPE,U/SHT,SPLIT,FILM,60X84,STERILE: Brand: MEDLINE

## (undated) DEVICE — SPONGE,NEURO,0.5"X1",XR,STRL,LF,10/PK: Brand: MEDLINE

## (undated) DEVICE — BLADE ASSEMB CLP HAIR FINE --

## (undated) DEVICE — STANDARD HYPODERMIC NEEDLE,POLYPROPYLENE HUB: Brand: MONOJECT

## (undated) DEVICE — DRSG AQUACEL SURG 3.5X6IN -- CONVERT TO ITEM 369227

## (undated) DEVICE — GOWN,SIRUS,FABRNF,RAGLAN,2XL,ST,28/CS: Brand: MEDLINE

## (undated) DEVICE — INTENDED FOR TISSUE SEPARATION, AND OTHER PROCEDURES THAT REQUIRE A SHARP SURGICAL BLADE TO PUNCTURE OR CUT.: Brand: BARD-PARKER SAFETY BLADES SIZE 15, STERILE

## (undated) DEVICE — AGENT HEMSTAT 8ML FLX TIP MTRX + DISP SURGIFLO

## (undated) DEVICE — SUTURE VCRL VIO BR 0 18IN C/R M04 J701D

## (undated) DEVICE — GLOVE ORTHO 8   MSG9480

## (undated) DEVICE — SOLUTION IV 1000ML 0.9% SOD CHL

## (undated) DEVICE — SPINE NEURO: Brand: MEDLINE INDUSTRIES, INC.

## (undated) DEVICE — DRAIN SURG PENROSE 0.25X12 IN CLOSED WND DRAINAGE PREM SIL

## (undated) DEVICE — GAUZE,SPONGE,8"X4",12PLY,XRAY,STRL,LF: Brand: MEDLINE

## (undated) DEVICE — PREP SKN CHLRAPRP APL 26ML STR --

## (undated) DEVICE — DRAPE TWL SURG 16X26IN BLU ORB04] ALLCARE INC]